# Patient Record
Sex: MALE | Race: WHITE | Employment: OTHER | URBAN - METROPOLITAN AREA
[De-identification: names, ages, dates, MRNs, and addresses within clinical notes are randomized per-mention and may not be internally consistent; named-entity substitution may affect disease eponyms.]

---

## 2017-09-14 PROBLEM — Z86.73 HISTORY OF STROKE: Status: ACTIVE | Noted: 2017-09-14

## 2017-09-14 PROBLEM — F01.50 VASCULAR DEMENTIA WITHOUT BEHAVIORAL DISTURBANCE (HCC): Status: ACTIVE | Noted: 2017-09-14

## 2017-09-14 PROBLEM — G62.9 NEUROPATHY: Status: ACTIVE | Noted: 2017-09-14

## 2018-05-16 ENCOUNTER — OFFICE VISIT (OUTPATIENT)
Dept: NEUROLOGY | Age: 78
End: 2018-05-16
Payer: MEDICARE

## 2018-05-16 VITALS
OXYGEN SATURATION: 93 % | HEIGHT: 71 IN | BODY MASS INDEX: 27.72 KG/M2 | SYSTOLIC BLOOD PRESSURE: 128 MMHG | DIASTOLIC BLOOD PRESSURE: 77 MMHG | WEIGHT: 198 LBS | HEART RATE: 73 BPM

## 2018-05-16 DIAGNOSIS — R42 VERTIGO: ICD-10-CM

## 2018-05-16 DIAGNOSIS — R27.0 ATAXIA: Primary | ICD-10-CM

## 2018-05-16 PROCEDURE — 99214 OFFICE O/P EST MOD 30 MIN: CPT | Performed by: NURSE PRACTITIONER

## 2018-05-16 RX ORDER — PANTOPRAZOLE SODIUM 40 MG/1
40 TABLET, DELAYED RELEASE ORAL EVERY MORNING
Status: ON HOLD | COMMUNITY
Start: 2018-03-12 | End: 2022-08-18 | Stop reason: SDUPTHER

## 2018-06-01 ENCOUNTER — TELEPHONE (OUTPATIENT)
Dept: NEUROLOGY | Age: 78
End: 2018-06-01

## 2018-06-08 ENCOUNTER — TELEPHONE (OUTPATIENT)
Dept: NEUROLOGY | Age: 78
End: 2018-06-08

## 2018-08-16 ENCOUNTER — OFFICE VISIT (OUTPATIENT)
Dept: NEUROLOGY | Age: 78
End: 2018-08-16
Payer: MEDICARE

## 2018-08-16 VITALS
RESPIRATION RATE: 18 BRPM | WEIGHT: 194 LBS | HEART RATE: 72 BPM | HEIGHT: 71 IN | OXYGEN SATURATION: 97 % | BODY MASS INDEX: 27.16 KG/M2 | SYSTOLIC BLOOD PRESSURE: 114 MMHG | DIASTOLIC BLOOD PRESSURE: 70 MMHG

## 2018-08-16 DIAGNOSIS — G62.9 PERIPHERAL POLYNEUROPATHY: ICD-10-CM

## 2018-08-16 DIAGNOSIS — F03.90 DEMENTIA WITHOUT BEHAVIORAL DISTURBANCE, UNSPECIFIED DEMENTIA TYPE: Primary | ICD-10-CM

## 2018-08-16 PROCEDURE — 99213 OFFICE O/P EST LOW 20 MIN: CPT | Performed by: NURSE PRACTITIONER

## 2018-08-16 NOTE — PROGRESS NOTES
R---wears aids   IX: soft palate elevation  Normal   IX,X: gag reflex    XI: trapezius strength  5/5   XI: sternocleidomastoid strength 5/5   XI: neck extension strength  5/5   XII: tongue strength  Normal     Motor:  5/5 throughout  No drift  Spastic legs  No abnormal movements    Sensory:  LT normal b/l  PP decreased to mid shin b/l  Vibration minimally impaired R ankle; mod at L    Coordination:   FN and FFM intact b/l  HS slower but intact b/l    Gait:  Normal but slightly spastic  Romberg's neg    DTR:   Right Brachioradialis reflex 1+  Left Brachioradialis reflex 1+  Right Biceps reflex 1+  Left Biceps reflex 1+  Right Triceps reflex 1+  Left Triceps reflex 1+  Right Quadriceps reflex 1+  Left Quadriceps reflex 1+  Right Achilles reflex 0  Left Achilles reflex 0    No Rodriguez's  No grasps, suck, or other pathological reflexes    Laboratory/Radiology:  ry/Radiology:     No current labs to review    MRI brain disc personally review in 2018: no acute strokes; remote occipital stroke; min-mod remote     Assessment:     The patient suffers from dementia---suspect due to Alzheimer's disease---and is stable on Namenda 10 mg BID and Aricept 10mg daily    No worsening of his memory or behavioral issues on this regimen which will be continued    MRI proved no new strokes and mild-mod small vessel disease. He remains on ASA/statin for secondary prevention    His neuro exam reveals sensory changes in both legs consistent with LS radiculopathy---which was suspected based on his EDX studies. Fall precautions were again reviewed as this is contributing to his gait issues. He was encouraged to remain physically and socially active    Medically, he is stable.     Plan:     Continue Namenda 10 mg BID and Aricept 10 mg nightly    Continue ASA/statin    Obtain recent labs from his PCP    He will call with any questions    RTO in 6 months or sooner LIZETH Pennington FNP-C  1:55 PM  2018

## 2018-08-16 NOTE — PATIENT INSTRUCTIONS
exactly as prescribed. Call the doctor if you notice any problems with the medicine. · Make a list of the person's medicines. Review it with all of his or her doctors. · Help the person eat a balanced diet. Serve plenty of whole grains, fruits, and vegetables every day. If the person is not hungry at mealtimes, give snacks at midmorning and in the afternoon. Offer drinks such as Boost, Ensure, or Sustacal if the person is losing weight. · Encourage exercise. Walking and other activities may slow the decline of mental ability. Help the person stay active mentally with reading, crossword puzzles, or other hobbies. · Take steps to help if the person is sundowning. This is the restless behavior and trouble with sleeping that may occur in late afternoon and at night. Try not to let the person nap during the day. Offer a glass of warm milk or caffeine-free tea before bedtime. · Develop a routine. Your loved one will feel less frustrated or confused with a clear, simple plan of what to do every day. · Be patient. A task may take the person longer than it used to. · For as long as he or she is able, allow your loved one to make decisions about activities, food, clothing, and other choices. Let him or her be independent, even if tasks take more time or are not done perfectly. Tailor tasks to the person's abilities. For example, if cooking is no longer safe, ask for other help. Your loved one can help set the table, or make simple dishes such as a salad. When the person needs help, offer it gently. Staying safe  · Make your home (or your loved one's home) safe. Tack down rugs, and put no-slip tape in the tub. Install handrails, and put safety switches on stoves and appliances. Keep rooms free of clutter. Make sure walkways around furniture are clear. Do not move furniture around, because the person may become confused. · Use locks on doors and cupboards.  Lock up knives, scissors, medicines, cleaning supplies, and to learn more about \"Helping A Person With Dementia: Care Instructions. \"     If you do not have an account, please click on the \"Sign Up Now\" link. Current as of: December 7, 2017  Content Version: 11.7  © 5891-8930 [employer]. Care instructions adapted under license by [facility] ([facility]). If you have questions about a medical condition or this instruction, always ask your healthcare professional. [address] any warranty or liability for your use of this information.

## 2018-08-20 ENCOUNTER — TELEPHONE (OUTPATIENT)
Dept: NEUROLOGY | Age: 78
End: 2018-08-20

## 2018-08-20 NOTE — TELEPHONE ENCOUNTER
The testing was done correctly. If she reads the impression and summary, both the arms and legs are mentioned.  Thanks you

## 2018-10-17 ENCOUNTER — TELEPHONE (OUTPATIENT)
Dept: NEUROLOGY | Age: 78
End: 2018-10-17

## 2018-10-17 ENCOUNTER — OFFICE VISIT (OUTPATIENT)
Dept: NEUROLOGY | Age: 78
End: 2018-10-17
Payer: MEDICARE

## 2018-10-17 VITALS
HEIGHT: 71 IN | TEMPERATURE: 97.4 F | WEIGHT: 202 LBS | RESPIRATION RATE: 12 BRPM | DIASTOLIC BLOOD PRESSURE: 74 MMHG | SYSTOLIC BLOOD PRESSURE: 128 MMHG | OXYGEN SATURATION: 92 % | BODY MASS INDEX: 28.28 KG/M2 | HEART RATE: 72 BPM

## 2018-10-17 DIAGNOSIS — F03.90 DEMENTIA WITHOUT BEHAVIORAL DISTURBANCE, UNSPECIFIED DEMENTIA TYPE: Primary | ICD-10-CM

## 2018-10-17 PROCEDURE — 99213 OFFICE O/P EST LOW 20 MIN: CPT | Performed by: NURSE PRACTITIONER

## 2018-10-17 RX ORDER — ASCORBIC ACID 1000 MG
TABLET ORAL DAILY
COMMUNITY
End: 2019-03-20

## 2018-10-17 RX ORDER — MEMANTINE HYDROCHLORIDE 10 MG/1
10 TABLET ORAL 2 TIMES DAILY
Qty: 180 TABLET | Refills: 2 | Status: SHIPPED
Start: 2018-10-17 | End: 2020-06-24 | Stop reason: SDUPTHER

## 2018-10-17 RX ORDER — DONEPEZIL HYDROCHLORIDE 10 MG/1
TABLET, FILM COATED ORAL
Qty: 30 TABLET | Refills: 3 | Status: SHIPPED | OUTPATIENT
Start: 2018-10-17 | End: 2019-03-20 | Stop reason: DRUGHIGH

## 2018-10-17 NOTE — PROGRESS NOTES
[address] [place] [address]. Aleshia Amaya M.D., F.A.C.P. Pelon Quijano, DNP, APRN, [facility]  Tarun Spink. Jimbo Brady, MSN, APRN, FNP-C  [address] [facility]  RHE[place] floyd, [address]  Phone: [phone]  Fax: [phone]       Britta Claire is a 65 y.o. right handed man    We are following him for dementia and LS radiculopathy    He presents alone today---tells me his wife was still in bed when he left the house    He denies any new or worsening memory issues since his last visit. Still taking Namenda 10 mg BID and Aricept---taking 5 mg in AM and 5 mg PM per his request due to side effects with full dose at bedtime. No issues driving or navigating to familiar places. No difficulty performing his ADLs, eating, drinking, or sleeping    No behavioral or mood issues. Still remains socially active and reads every night. He still notes some issues with walking and some balance difficulties---particularly in his L leg. He tells me his wife thinks he shuffles but he does not think so---no falls.     No new weakness, speech changes, paresthesias, chewing/swallowing troubles, or other new neuro issues    No recent labs to review    Medically, he is otherwise stable    No chest pain or palpitations  No SOB  No incontinence of bowels or bladder  No itching or bruising appreciated    ROS otherwise negative     Current Outpatient Prescriptions   Medication Sig Dispense Refill    Ginkgo Biloba 40 MG TABS Take by mouth daily      pantoprazole (PROTONIX) 40 MG tablet       memantine (NAMENDA) 10 MG tablet Take 1 tablet by mouth 2 times daily 180 tablet 2    donepezil (ARICEPT) 10 MG tablet Take 10 mg by mouth nightly 1/2 in the am 1/2 in the pm      atorvastatin (LIPITOR) 40 MG tablet Take 40 mg by mouth daily      amLODIPine (NORVASC) 10 MG tablet Take 10 mg by mouth daily      vitamin B-12 (CYANOCOBALAMIN) 500 MCG tablet Take 500 mcg by mouth daily      Multiple Vitamins-Minerals

## 2018-10-17 NOTE — PATIENT INSTRUCTIONS
helps you feel independent. Your state 's license bureau can do a driving test if there is any question. Plan for other means of getting around when you are no longer able to drive. · Use strong lighting, especially at night. Put night-lights in bedrooms, hallways, and bathrooms. · Lower the hot water temperature setting to 120°F or lower to avoid burns. When should you call for help? Call 911 anytime you think you may need emergency care. For example, call if:    · You are lost and do not know whom to call.     · You are injured and do not know whom to call.    Call your doctor now or seek immediate medical care if:    · Your symptoms suddenly get much worse.    Watch closely for changes in your health, and be sure to contact your doctor if:    · You want more information about how you can take care of yourself. Where can you learn more? Go to [email or web address] and sign in to your [employer] account. Enter Y179 in the Purple box to learn more about \"Alzheimer's Disease: Care Instructions. \"     If you do not have an account, please click on the \"Sign Up Now\" link. Current as of: December 7, 2017  Content Version: 11.7  © 2735-8941 [employer]. Care instructions adapted under license by [facility] ([facility]). If you have questions about a medical condition or this instruction, always ask your healthcare professional. [place] [address] any warranty or liability for your use of this information.

## 2018-12-12 ENCOUNTER — TELEPHONE (OUTPATIENT)
Dept: NEUROLOGY | Age: 78
End: 2018-12-12

## 2018-12-12 NOTE — TELEPHONE ENCOUNTER
MA spoke with pt's wife, Denisse Mcduffie, who stated they had received a letter from [facility] that LIZETH Levine, was not an in network provider. MA spoke with [facility] [address] [place] in payer delegation/enrollment who stated Devonte Barfield was inadvertently inactivated back in early 2018 due to a technical issue but is in the process of being reloaded back in with the payers. This will not be updated with the payers until January but Devonte Barfield can still see patients; however, billing will hold her claims until payers confirm reloaded. MA informed Denisse  of this who verbalized understanding.   Electronically signed by Xi Taylor on 12/12/18 at 11:21 AM

## 2019-01-10 ENCOUNTER — TELEPHONE (OUTPATIENT)
Dept: NEUROLOGY | Age: 79
End: 2019-01-10

## 2019-02-08 ENCOUNTER — TELEPHONE (OUTPATIENT)
Dept: NEUROLOGY | Age: 79
End: 2019-02-08

## 2019-02-25 ENCOUNTER — TELEPHONE (OUTPATIENT)
Dept: NEUROLOGY | Age: 79
End: 2019-02-25

## 2019-03-05 ENCOUNTER — TELEPHONE (OUTPATIENT)
Dept: NEUROLOGY | Age: 79
End: 2019-03-05

## 2019-03-20 ENCOUNTER — OFFICE VISIT (OUTPATIENT)
Dept: NEUROLOGY | Age: 79
End: 2019-03-20
Payer: MEDICARE

## 2019-03-20 VITALS
HEIGHT: 71 IN | BODY MASS INDEX: 27.72 KG/M2 | RESPIRATION RATE: 15 BRPM | OXYGEN SATURATION: 95 % | WEIGHT: 198 LBS | HEART RATE: 79 BPM | DIASTOLIC BLOOD PRESSURE: 70 MMHG | TEMPERATURE: 97 F | SYSTOLIC BLOOD PRESSURE: 145 MMHG

## 2019-03-20 DIAGNOSIS — F02.80 ALZHEIMER'S DEMENTIA WITHOUT BEHAVIORAL DISTURBANCE, UNSPECIFIED TIMING OF DEMENTIA ONSET: Primary | ICD-10-CM

## 2019-03-20 DIAGNOSIS — G62.9 SENSORY MOTOR NEUROPATHY: ICD-10-CM

## 2019-03-20 DIAGNOSIS — M54.10 RADICULAR PAIN OF BOTH LOWER EXTREMITIES: ICD-10-CM

## 2019-03-20 DIAGNOSIS — G30.9 ALZHEIMER'S DEMENTIA WITHOUT BEHAVIORAL DISTURBANCE, UNSPECIFIED TIMING OF DEMENTIA ONSET: Primary | ICD-10-CM

## 2019-03-20 PROCEDURE — 4040F PNEUMOC VAC/ADMIN/RCVD: CPT | Performed by: NURSE PRACTITIONER

## 2019-03-20 PROCEDURE — G8419 CALC BMI OUT NRM PARAM NOF/U: HCPCS | Performed by: NURSE PRACTITIONER

## 2019-03-20 PROCEDURE — 1101F PT FALLS ASSESS-DOCD LE1/YR: CPT | Performed by: NURSE PRACTITIONER

## 2019-03-20 PROCEDURE — 1036F TOBACCO NON-USER: CPT | Performed by: NURSE PRACTITIONER

## 2019-03-20 PROCEDURE — G8484 FLU IMMUNIZE NO ADMIN: HCPCS | Performed by: NURSE PRACTITIONER

## 2019-03-20 PROCEDURE — G8427 DOCREV CUR MEDS BY ELIG CLIN: HCPCS | Performed by: NURSE PRACTITIONER

## 2019-03-20 PROCEDURE — 1123F ACP DISCUSS/DSCN MKR DOCD: CPT | Performed by: NURSE PRACTITIONER

## 2019-03-20 PROCEDURE — 99215 OFFICE O/P EST HI 40 MIN: CPT | Performed by: NURSE PRACTITIONER

## 2019-03-20 RX ORDER — DONEPEZIL HYDROCHLORIDE 10 MG/1
TABLET, FILM COATED ORAL
Qty: 30 TABLET | Refills: 3 | Status: SHIPPED
Start: 2019-03-20 | End: 2020-06-24 | Stop reason: ALTCHOICE

## 2019-03-20 RX ORDER — VITAMIN E 268 MG
400 CAPSULE ORAL EVERY MORNING
COMMUNITY
Start: 2009-12-09 | End: 2022-08-16 | Stop reason: ALTCHOICE

## 2019-03-21 ENCOUNTER — TELEPHONE (OUTPATIENT)
Dept: NEUROLOGY | Age: 79
End: 2019-03-21

## 2019-03-25 ENCOUNTER — TELEPHONE (OUTPATIENT)
Dept: NEUROLOGY | Age: 79
End: 2019-03-25

## 2019-04-05 ENCOUNTER — HOSPITAL ENCOUNTER (OUTPATIENT)
Dept: NEUROLOGY | Age: 79
Discharge: HOME | End: 2019-04-05
Payer: MEDICARE

## 2019-04-05 DIAGNOSIS — G62.9 PERIPHERAL POLYNEUROPATHY: ICD-10-CM

## 2019-04-05 DIAGNOSIS — M54.17 L-S RADICULOPATHY: Primary | ICD-10-CM

## 2019-04-05 DIAGNOSIS — M54.17 L-S RADICULOPATHY: ICD-10-CM

## 2019-04-05 PROCEDURE — 95911 NRV CNDJ TEST 9-10 STUDIES: CPT | Performed by: PSYCHIATRY & NEUROLOGY

## 2019-04-05 PROCEDURE — 95911 NRV CNDJ TEST 9-10 STUDIES: CPT

## 2019-04-05 PROCEDURE — 95886 MUSC TEST DONE W/N TEST COMP: CPT

## 2019-04-05 PROCEDURE — 95886 MUSC TEST DONE W/N TEST COMP: CPT | Performed by: PSYCHIATRY & NEUROLOGY

## 2019-04-05 NOTE — PROCEDURES
head) N None None None None N N N N   R. Vastus lateralis N None None None None N N N N   R. Gastrocnemius (Medial head) N None None None None N N 1+ Decr   R. Tibialis anterior N None None None None N N 1+ Decr   R. Flexor digitorum longus N None None None None N N 1+ Sl Decr   R. Extensor hallucis longus N None None None None N N 1+ Decr   R. Dorsal interossei (pedis) N None None None None N N N Distant MUPs   R. Extensor digitorum brevis N None None None None N N N Distant MUPs         Nerve conduction studies in both legs disclosed the following abnormalities---absent superficial peroneal and sural sensory nerve action potentials. The F-wave latencies of the left peroneal and both tibial nerves were slightly delayed. Both H reflex latencies were not obtained. These findings were compared to the referential values in this laboratory, available upon request.    Monopolar needle examination of the right leg disclosed chronic axonal loss with reinnervation in the distal muscles of that limb. Needle testing of the paraspinals was unremarkable as well. 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 There were no other peripheral neuropathies. There were no definitive motor radiculopathies or intracanalicular lesions. Sensory radiculopathies can not be evaluated by electrodiagnostic means. The studies revealed minimal progression of this peripheral neuropathic disease compared to previous studies 2 years ago. Clinically, the patient presented with a frontal gait disorder and distal leg sensory findings. The above studies confirmed a sensorimotor peripheral neuropathy---again minimal.  This disorder may be contributing to his gait abnormalities. Clinical correlation was highly advised.

## 2019-05-03 ENCOUNTER — TELEPHONE (OUTPATIENT)
Dept: NEUROLOGY | Age: 79
End: 2019-05-03

## 2019-05-03 NOTE — TELEPHONE ENCOUNTER
Patient's wife Annette Spoon called in asking for report on EMG that Sadaf Reynaga had done 4/5/2019. She is going to get that from Medical Records at the hospital. She stated that Dr. Elenita Lozada told them that there was a pinched nerve and that he should have MRI done on his back. There is nothing in the report. Patient is wanting Ty Grimaldo to look at the report and see if she thinks he needs an MRI. MA informed patient that this would be forwarded to Ty STANLEY for advisement. Patient can be reached at [phone].

## 2019-05-03 NOTE — TELEPHONE ENCOUNTER
EMG proved minimal peripheral neuropathy--with minimal progression since the past study---- and no evidence of pinched nerve, therefore no need for MRI. Some of his gait issues are due to his dementia, which can cause changes in walking and a shuffling gait--the little bit of neuropathy is contributing.

## 2019-05-03 NOTE — TELEPHONE ENCOUNTER
MA informed Viry Talley of EMG results per Gevena Kayser and Viry Talley understood.   Electronically signed by Ehsan Nesbitt on 5/3/19 at 2:18 PM

## 2019-06-11 ENCOUNTER — APPOINTMENT (OUTPATIENT)
Dept: GENERAL RADIOLOGY | Age: 79
DRG: 871 | End: 2019-06-11
Payer: MEDICARE

## 2019-06-11 ENCOUNTER — APPOINTMENT (OUTPATIENT)
Dept: CT IMAGING | Age: 79
DRG: 871 | End: 2019-06-11
Payer: MEDICARE

## 2019-06-11 ENCOUNTER — HOSPITAL ENCOUNTER (INPATIENT)
Age: 79
LOS: 5 days | Discharge: HOME HEALTH | DRG: 871 | End: 2019-06-16
Attending: EMERGENCY MEDICINE | Admitting: INTERNAL MEDICINE
Payer: MEDICARE

## 2019-06-11 DIAGNOSIS — J18.9 PNEUMONIA DUE TO ORGANISM: ICD-10-CM

## 2019-06-11 DIAGNOSIS — J96.01 ACUTE RESPIRATORY FAILURE WITH HYPOXIA (HCC): ICD-10-CM

## 2019-06-11 DIAGNOSIS — E87.20 LACTIC ACIDOSIS: ICD-10-CM

## 2019-06-11 DIAGNOSIS — K56.609 SMALL BOWEL OBSTRUCTION (HCC): Primary | ICD-10-CM

## 2019-06-11 LAB
ABO/RH: NORMAL
ALBUMIN SERPL-MCNC: 4.2 G/DL (ref 3.5–5.2)
ALP BLD-CCNC: 116 U/L (ref 40–129)
ALT SERPL-CCNC: 15 U/L (ref 0–40)
ANION GAP SERPL CALCULATED.3IONS-SCNC: 14 MMOL/L (ref 7–16)
ANTIBODY SCREEN: NORMAL
AST SERPL-CCNC: 23 U/L (ref 0–39)
B.E.: -5.2 MMOL/L (ref -3–3)
BACTERIA: ABNORMAL /HPF
BILIRUB SERPL-MCNC: 0.9 MG/DL (ref 0–1.2)
BILIRUBIN URINE: NEGATIVE
BLOOD, URINE: ABNORMAL
BUN BLDV-MCNC: 21 MG/DL (ref 8–23)
CALCIUM SERPL-MCNC: 8.8 MG/DL (ref 8.6–10.2)
CASTS UA: ABNORMAL /LPF
CHLORIDE BLD-SCNC: 102 MMOL/L (ref 98–107)
CLARITY: ABNORMAL
CO2: 23 MMOL/L (ref 22–29)
COHB: 1.1 % (ref 0–1.5)
COLOR: YELLOW
CREAT SERPL-MCNC: 1.4 MG/DL (ref 0.7–1.2)
CRITICAL: ABNORMAL
DATE ANALYZED: ABNORMAL
DATE OF COLLECTION: ABNORMAL
EKG ATRIAL RATE: 68 BPM
EKG P AXIS: 52 DEGREES
EKG P-R INTERVAL: 196 MS
EKG Q-T INTERVAL: 426 MS
EKG QRS DURATION: 86 MS
EKG QTC CALCULATION (BAZETT): 452 MS
EKG R AXIS: 38 DEGREES
EKG T AXIS: 51 DEGREES
EKG VENTRICULAR RATE: 68 BPM
FILM ARRAY ADENOVIRUS: NORMAL
FILM ARRAY BORDETELLA PERTUSSIS: NORMAL
FILM ARRAY CHLAMYDOPHILIA PNEUMONIAE: NORMAL
FILM ARRAY CORONAVIRUS 229E: NORMAL
FILM ARRAY CORONAVIRUS HKU1: NORMAL
FILM ARRAY CORONAVIRUS NL63: NORMAL
FILM ARRAY CORONAVIRUS OC43: NORMAL
FILM ARRAY INFLUENZA A VIRUS 09H1: NORMAL
FILM ARRAY INFLUENZA A VIRUS H1: NORMAL
FILM ARRAY INFLUENZA A VIRUS H3: NORMAL
FILM ARRAY INFLUENZA A VIRUS: NORMAL
FILM ARRAY INFLUENZA B: NORMAL
FILM ARRAY METAPNEUMOVIRUS: NORMAL
FILM ARRAY MYCOPLASMA PNEUMONIAE: NORMAL
FILM ARRAY PARAINFLUENZA VIRUS 1: NORMAL
FILM ARRAY PARAINFLUENZA VIRUS 2: NORMAL
FILM ARRAY PARAINFLUENZA VIRUS 3: NORMAL
FILM ARRAY PARAINFLUENZA VIRUS 4: NORMAL
FILM ARRAY RESPIRATORY SYNCITIAL VIRUS: NORMAL
FILM ARRAY RHINOVIRUS/ENTEROVIRUS: NORMAL
GFR AFRICAN AMERICAN: 59
GFR NON-AFRICAN AMERICAN: 49 ML/MIN/1.73
GLUCOSE BLD-MCNC: 189 MG/DL (ref 74–99)
GLUCOSE URINE: 100 MG/DL
HCO3: 20 MMOL/L (ref 22–26)
HCT VFR BLD CALC: 50.6 % (ref 37–54)
HEMOGLOBIN: 17.6 G/DL (ref 12.5–16.5)
HHB: 16.1 % (ref 0–5)
KETONES, URINE: NEGATIVE MG/DL
LAB: ABNORMAL
LACTIC ACID: 2.6 MMOL/L (ref 0.5–2.2)
LACTIC ACID: 5.4 MMOL/L (ref 0.5–2.2)
LEUKOCYTE ESTERASE, URINE: NEGATIVE
LIPASE: 21 U/L (ref 13–60)
Lab: ABNORMAL
MCH RBC QN AUTO: 32.4 PG (ref 26–35)
MCHC RBC AUTO-ENTMCNC: 34.8 % (ref 32–34.5)
MCV RBC AUTO: 93.2 FL (ref 80–99.9)
METHB: 0.2 % (ref 0–1.5)
MODE: ABNORMAL
NITRITE, URINE: NEGATIVE
O2 CONTENT: 20.3 ML/DL
O2 SATURATION: 83.7 % (ref 92–98.5)
O2HB: 82.6 % (ref 94–97)
OPERATOR ID: 101
PATIENT TEMP: 37 C
PCO2: 38.2 MMHG (ref 35–45)
PDW BLD-RTO: 12.2 FL (ref 11.5–15)
PH BLOOD GAS: 7.34 (ref 7.35–7.45)
PH UA: 6 (ref 5–9)
PLATELET # BLD: 130 E9/L (ref 130–450)
PMV BLD AUTO: 10.9 FL (ref 7–12)
PO2: 51.4 MMHG (ref 60–100)
POTASSIUM REFLEX MAGNESIUM: 4.3 MMOL/L (ref 3.5–5)
PRO-BNP: 170 PG/ML (ref 0–450)
PROTEIN UA: 30 MG/DL
RBC # BLD: 5.43 E12/L (ref 3.8–5.8)
RBC UA: ABNORMAL /HPF (ref 0–2)
SODIUM BLD-SCNC: 139 MMOL/L (ref 132–146)
SOURCE, BLOOD GAS: ABNORMAL
SPECIFIC GRAVITY UA: 1.01 (ref 1–1.03)
THB: 17.5 G/DL (ref 11.5–16.5)
TIME ANALYZED: 1639
TOTAL PROTEIN: 6.6 G/DL (ref 6.4–8.3)
TROPONIN: <0.01 NG/ML (ref 0–0.03)
UROBILINOGEN, URINE: 0.2 E.U./DL
WBC # BLD: 9 E9/L (ref 4.5–11.5)
WBC UA: ABNORMAL /HPF (ref 0–5)

## 2019-06-11 PROCEDURE — 87088 URINE BACTERIA CULTURE: CPT

## 2019-06-11 PROCEDURE — 86901 BLOOD TYPING SEROLOGIC RH(D): CPT

## 2019-06-11 PROCEDURE — 87581 M.PNEUMON DNA AMP PROBE: CPT

## 2019-06-11 PROCEDURE — 2580000003 HC RX 258: Performed by: INTERNAL MEDICINE

## 2019-06-11 PROCEDURE — 2060000000 HC ICU INTERMEDIATE R&B

## 2019-06-11 PROCEDURE — 82805 BLOOD GASES W/O2 SATURATION: CPT

## 2019-06-11 PROCEDURE — 85027 COMPLETE CBC AUTOMATED: CPT

## 2019-06-11 PROCEDURE — C9113 INJ PANTOPRAZOLE SODIUM, VIA: HCPCS | Performed by: STUDENT IN AN ORGANIZED HEALTH CARE EDUCATION/TRAINING PROGRAM

## 2019-06-11 PROCEDURE — 99285 EMERGENCY DEPT VISIT HI MDM: CPT

## 2019-06-11 PROCEDURE — 86850 RBC ANTIBODY SCREEN: CPT

## 2019-06-11 PROCEDURE — 6360000002 HC RX W HCPCS: Performed by: INTERNAL MEDICINE

## 2019-06-11 PROCEDURE — 71275 CT ANGIOGRAPHY CHEST: CPT

## 2019-06-11 PROCEDURE — 87040 BLOOD CULTURE FOR BACTERIA: CPT

## 2019-06-11 PROCEDURE — 2580000003 HC RX 258: Performed by: STUDENT IN AN ORGANIZED HEALTH CARE EDUCATION/TRAINING PROGRAM

## 2019-06-11 PROCEDURE — 87486 CHLMYD PNEUM DNA AMP PROBE: CPT

## 2019-06-11 PROCEDURE — 80053 COMPREHEN METABOLIC PANEL: CPT

## 2019-06-11 PROCEDURE — 83880 ASSAY OF NATRIURETIC PEPTIDE: CPT

## 2019-06-11 PROCEDURE — 84484 ASSAY OF TROPONIN QUANT: CPT

## 2019-06-11 PROCEDURE — 96365 THER/PROPH/DIAG IV INF INIT: CPT

## 2019-06-11 PROCEDURE — 86900 BLOOD TYPING SEROLOGIC ABO: CPT

## 2019-06-11 PROCEDURE — 2580000003 HC RX 258: Performed by: EMERGENCY MEDICINE

## 2019-06-11 PROCEDURE — 74022 RADEX COMPL AQT ABD SERIES: CPT

## 2019-06-11 PROCEDURE — 87633 RESP VIRUS 12-25 TARGETS: CPT

## 2019-06-11 PROCEDURE — 83605 ASSAY OF LACTIC ACID: CPT

## 2019-06-11 PROCEDURE — 6360000002 HC RX W HCPCS: Performed by: STUDENT IN AN ORGANIZED HEALTH CARE EDUCATION/TRAINING PROGRAM

## 2019-06-11 PROCEDURE — 6360000004 HC RX CONTRAST MEDICATION: Performed by: RADIOLOGY

## 2019-06-11 PROCEDURE — 6370000000 HC RX 637 (ALT 250 FOR IP): Performed by: STUDENT IN AN ORGANIZED HEALTH CARE EDUCATION/TRAINING PROGRAM

## 2019-06-11 PROCEDURE — 81001 URINALYSIS AUTO W/SCOPE: CPT

## 2019-06-11 PROCEDURE — 96375 TX/PRO/DX INJ NEW DRUG ADDON: CPT

## 2019-06-11 PROCEDURE — 87798 DETECT AGENT NOS DNA AMP: CPT

## 2019-06-11 PROCEDURE — 93010 ELECTROCARDIOGRAM REPORT: CPT | Performed by: INTERNAL MEDICINE

## 2019-06-11 PROCEDURE — 74177 CT ABD & PELVIS W/CONTRAST: CPT

## 2019-06-11 PROCEDURE — 96367 TX/PROPH/DG ADDL SEQ IV INF: CPT

## 2019-06-11 PROCEDURE — 6360000002 HC RX W HCPCS: Performed by: EMERGENCY MEDICINE

## 2019-06-11 PROCEDURE — 6370000000 HC RX 637 (ALT 250 FOR IP): Performed by: EMERGENCY MEDICINE

## 2019-06-11 PROCEDURE — 94640 AIRWAY INHALATION TREATMENT: CPT

## 2019-06-11 PROCEDURE — C9113 INJ PANTOPRAZOLE SODIUM, VIA: HCPCS | Performed by: INTERNAL MEDICINE

## 2019-06-11 PROCEDURE — 36415 COLL VENOUS BLD VENIPUNCTURE: CPT

## 2019-06-11 PROCEDURE — 93005 ELECTROCARDIOGRAM TRACING: CPT | Performed by: STUDENT IN AN ORGANIZED HEALTH CARE EDUCATION/TRAINING PROGRAM

## 2019-06-11 PROCEDURE — 83690 ASSAY OF LIPASE: CPT

## 2019-06-11 RX ORDER — SODIUM CHLORIDE 9 MG/ML
INJECTION, SOLUTION INTRAVENOUS CONTINUOUS
Status: DISCONTINUED | OUTPATIENT
Start: 2019-06-11 | End: 2019-06-11

## 2019-06-11 RX ORDER — SODIUM CHLORIDE 0.9 % (FLUSH) 0.9 %
10 SYRINGE (ML) INJECTION PRN
Status: DISCONTINUED | OUTPATIENT
Start: 2019-06-11 | End: 2019-06-16 | Stop reason: HOSPADM

## 2019-06-11 RX ORDER — SODIUM CHLORIDE 0.9 % (FLUSH) 0.9 %
10 SYRINGE (ML) INJECTION EVERY 12 HOURS SCHEDULED
Status: DISCONTINUED | OUTPATIENT
Start: 2019-06-11 | End: 2019-06-15

## 2019-06-11 RX ORDER — 0.9 % SODIUM CHLORIDE 0.9 %
1000 INTRAVENOUS SOLUTION INTRAVENOUS ONCE
Status: COMPLETED | OUTPATIENT
Start: 2019-06-11 | End: 2019-06-11

## 2019-06-11 RX ORDER — ONDANSETRON 2 MG/ML
4 INJECTION INTRAMUSCULAR; INTRAVENOUS EVERY 6 HOURS PRN
Status: DISCONTINUED | OUTPATIENT
Start: 2019-06-11 | End: 2019-06-16 | Stop reason: HOSPADM

## 2019-06-11 RX ORDER — SODIUM CHLORIDE 9 MG/ML
INJECTION, SOLUTION INTRAVENOUS CONTINUOUS
Status: ACTIVE | OUTPATIENT
Start: 2019-06-11 | End: 2019-06-12

## 2019-06-11 RX ORDER — ONDANSETRON 2 MG/ML
4 INJECTION INTRAMUSCULAR; INTRAVENOUS ONCE
Status: COMPLETED | OUTPATIENT
Start: 2019-06-11 | End: 2019-06-11

## 2019-06-11 RX ORDER — PANTOPRAZOLE SODIUM 40 MG/10ML
40 INJECTION, POWDER, LYOPHILIZED, FOR SOLUTION INTRAVENOUS ONCE
Status: COMPLETED | OUTPATIENT
Start: 2019-06-11 | End: 2019-06-11

## 2019-06-11 RX ORDER — IPRATROPIUM BROMIDE AND ALBUTEROL SULFATE 2.5; .5 MG/3ML; MG/3ML
1 SOLUTION RESPIRATORY (INHALATION)
Status: COMPLETED | OUTPATIENT
Start: 2019-06-11 | End: 2019-06-11

## 2019-06-11 RX ORDER — 0.9 % SODIUM CHLORIDE 0.9 %
1000 INTRAVENOUS SOLUTION INTRAVENOUS ONCE
Status: DISCONTINUED | OUTPATIENT
Start: 2019-06-11 | End: 2019-06-11

## 2019-06-11 RX ORDER — M-VIT,TX,IRON,MINS/CALC/FOLIC 27MG-0.4MG
1 TABLET ORAL EVERY MORNING
COMMUNITY

## 2019-06-11 RX ORDER — PANTOPRAZOLE SODIUM 40 MG/10ML
40 INJECTION, POWDER, LYOPHILIZED, FOR SOLUTION INTRAVENOUS DAILY
Status: DISCONTINUED | OUTPATIENT
Start: 2019-06-11 | End: 2019-06-12

## 2019-06-11 RX ORDER — CHLORAL HYDRATE 500 MG
1000 CAPSULE ORAL EVERY MORNING
COMMUNITY
End: 2021-07-26

## 2019-06-11 RX ORDER — FENTANYL CITRATE 50 UG/ML
50 INJECTION, SOLUTION INTRAMUSCULAR; INTRAVENOUS ONCE
Status: COMPLETED | OUTPATIENT
Start: 2019-06-11 | End: 2019-06-11

## 2019-06-11 RX ORDER — LORAZEPAM 2 MG/ML
0.5 INJECTION INTRAMUSCULAR
Status: ACTIVE | OUTPATIENT
Start: 2019-06-11 | End: 2019-06-11

## 2019-06-11 RX ORDER — 0.9 % SODIUM CHLORIDE 0.9 %
10 VIAL (ML) INJECTION DAILY
Status: DISCONTINUED | OUTPATIENT
Start: 2019-06-11 | End: 2019-06-12

## 2019-06-11 RX ORDER — IPRATROPIUM BROMIDE AND ALBUTEROL SULFATE 2.5; .5 MG/3ML; MG/3ML
1 SOLUTION RESPIRATORY (INHALATION)
Status: DISCONTINUED | OUTPATIENT
Start: 2019-06-12 | End: 2019-06-16 | Stop reason: HOSPADM

## 2019-06-11 RX ADMIN — MEROPENEM 1 G: 1 INJECTION INTRAVENOUS at 17:31

## 2019-06-11 RX ADMIN — AZITHROMYCIN 500 MG: 500 INJECTION, POWDER, LYOPHILIZED, FOR SOLUTION INTRAVENOUS at 22:41

## 2019-06-11 RX ADMIN — SODIUM CHLORIDE 1000 ML: 9 INJECTION, SOLUTION INTRAVENOUS at 14:54

## 2019-06-11 RX ADMIN — SODIUM CHLORIDE 1000 ML: 9 INJECTION, SOLUTION INTRAVENOUS at 18:52

## 2019-06-11 RX ADMIN — FENTANYL CITRATE 50 MCG: 50 INJECTION INTRAMUSCULAR; INTRAVENOUS at 14:57

## 2019-06-11 RX ADMIN — IPRATROPIUM BROMIDE AND ALBUTEROL SULFATE 1 AMPULE: .5; 3 SOLUTION RESPIRATORY (INHALATION) at 17:28

## 2019-06-11 RX ADMIN — PANTOPRAZOLE SODIUM 40 MG: 40 INJECTION, POWDER, FOR SOLUTION INTRAVENOUS at 22:40

## 2019-06-11 RX ADMIN — PANTOPRAZOLE SODIUM 40 MG: 40 INJECTION, POWDER, LYOPHILIZED, FOR SOLUTION INTRAVENOUS at 14:56

## 2019-06-11 RX ADMIN — LIDOCAINE HYDROCHLORIDE: 20 SOLUTION ORAL; TOPICAL at 17:30

## 2019-06-11 RX ADMIN — VANCOMYCIN HYDROCHLORIDE 1750 MG: 1 INJECTION, POWDER, LYOPHILIZED, FOR SOLUTION INTRAVENOUS at 18:04

## 2019-06-11 RX ADMIN — ONDANSETRON 4 MG: 2 INJECTION INTRAMUSCULAR; INTRAVENOUS at 14:57

## 2019-06-11 RX ADMIN — IOPAMIDOL 110 ML: 755 INJECTION, SOLUTION INTRAVENOUS at 17:03

## 2019-06-11 RX ADMIN — SODIUM CHLORIDE: 9 INJECTION, SOLUTION INTRAVENOUS at 21:05

## 2019-06-11 RX ADMIN — IPRATROPIUM BROMIDE AND ALBUTEROL SULFATE 1 AMPULE: .5; 3 SOLUTION RESPIRATORY (INHALATION) at 17:27

## 2019-06-11 RX ADMIN — IPRATROPIUM BROMIDE AND ALBUTEROL SULFATE 1 AMPULE: .5; 3 SOLUTION RESPIRATORY (INHALATION) at 17:26

## 2019-06-11 RX ADMIN — SODIUM CHLORIDE: 9 INJECTION, SOLUTION INTRAVENOUS at 21:30

## 2019-06-11 SDOH — HEALTH STABILITY: MENTAL HEALTH: HOW OFTEN DO YOU HAVE A DRINK CONTAINING ALCOHOL?: NEVER

## 2019-06-11 ASSESSMENT — PAIN SCALES - GENERAL
PAINLEVEL_OUTOF10: 8
PAINLEVEL_OUTOF10: 8
PAINLEVEL_OUTOF10: 0

## 2019-06-11 ASSESSMENT — ENCOUNTER SYMPTOMS: ABDOMINAL PAIN: 1

## 2019-06-11 ASSESSMENT — PAIN DESCRIPTION - PAIN TYPE: TYPE: ACUTE PAIN

## 2019-06-11 ASSESSMENT — PAIN DESCRIPTION - DESCRIPTORS: DESCRIPTORS: BURNING

## 2019-06-11 ASSESSMENT — PAIN DESCRIPTION - LOCATION: LOCATION: ABDOMEN

## 2019-06-11 ASSESSMENT — PAIN DESCRIPTION - ORIENTATION: ORIENTATION: MID

## 2019-06-11 NOTE — H&P
History and Physical      CHIEF COMPLAINT:  Abdominal pain      HISTORY OF PRESENT ILLNESS:      The patient is a 77 y.o. male patient of Dr. Ana Stallings  presents with abdominal pain with vomiting and diarrhea. The patient is presently alert and aware of illness c/o mid right abdominal pain and diarrhea. He denies fever or rigors. Past history of pulmonary asbestosis. The patient severely hypotensive in the ER with 4 L IVF administered. CT abdomen and pelvis:  IMPRESSIONS:   Small bowel obstruction versus ileus.   Consolidation within the bilateral lung bases may reflect infectious  pneumonia or atelectasis.         Past Medical History:    Past Medical History:   Diagnosis Date    [facility]A [facility]cere[facility] accident) [facility] [address] 2007    Heart murmur     Osteoarthritis     Prostate cancer ([facility] [address] 2007       Past Surgical History:    Past Surgical History:   Procedure Laterality Date    CATARACT REMOVAL      HERNIA REPAIR      PROSTATE SURGERY      beacons/radiation       Medications Prior to Admission:    Medications Prior to Admission: Omega-3 Fatty Acids (FISH OIL) 1000 MG CAPS, Take 1,000 mg by mouth every morning  Multiple Vitamins-Minerals (THERAPEUTIC MULTIVITAMIN-MINERALS) tablet, Take 1 tablet by mouth every morning  vitamin E 400 UNIT capsule, Take 400 Units by mouth every morning   donepezil (ARICEPT) 10 MG tablet, 1/2 tab twice daily (Patient taking differently: Take 5 mg by mouth 2 times daily )  memantine (NAMENDA) 10 MG tablet, Take 1 tablet by mouth 2 times daily  pantoprazole (PROTONIX) 40 MG tablet, Take 40 mg by mouth every morning   atorvastatin (LIPITOR) 40 MG tablet, Take 40 mg by mouth every morning   amLODIPine (NORVASC) 10 MG tablet, Take 10 mg by mouth every morning   vitamin B-12 (CYANOCOBALAMIN) 500 MCG tablet, Take 500 mcg by mouth every morning   aspirin 81 MG chewable tablet, Take 1 tablet by mouth daily (Patient taking differently: Take 81 mg by mouth every morning )  diazepam (VALIUM) 5 MG tablet, Take 5 mg by mouth every morning. Allergies:    Oxycodone-acetaminophen and Vicodin [hydrocodone-acetaminophen]    Social History:    reports that he quit smoking about 32 years ago. His smoking use included cigarettes. He started smoking about 60 years ago. He has never used smokeless tobacco. He reports that he does not drink alcohol or use drugs. Family History:   family history includes Crohn's Disease in his sister; Heart Disease in his father. REVIEW OF SYSTEMS:  As above in the HPI, otherwise negative    PHYSICAL EXAM:    Vitals:  BP (!) 98/56   Pulse 87   Temp 99 °F (37.2 °C) (Oral)   Resp 18   Ht 5' 7\" (1.702 m)   Wt 177 lb 14.4 oz (80.7 kg)   SpO2 93%   BMI 27.86 kg/m²     General:   Awake, alert, oriented X 3. No acute distress. HEENT:    Normocephalic, atraumatic. Pupils equal, round, reactive to light. No scleral icterus. No conjunctival injection. Oropharynx clear. No nasal discharge. Neck:       Supple. No bruits, adenopathy, masses, neck vein distention. Trachea in the midline. Heart:      RRR, no murmurs, gallops, rubs  Lungs:     CTA bilaterally, bilat symmetrical expansion, no wheeze, rales, or rhonchi  Abdomen:   Bowel sounds present, soft, mildly tender, no masses, no organomegaly, no peritoneal signs  Extremities:  No clubbing, cyanosis, or edema  Skin:         Warm and dry, no open lesions or rash  Neuro:     Cranial nerves 2-12 intact, no focal deficits  Rectal:    deferred  Genitalia:  deferred    LABS:    CBC with Differential:    Lab Results   Component Value Date    WBC 7.0 06/12/2019    RBC 4.40 06/12/2019    HGB 13.9 06/12/2019    HCT 41.5 06/12/2019     06/12/2019    MCV 94.3 06/12/2019    MCH 31.6 06/12/2019    MCHC 33.5 06/12/2019    RDW 12.4 06/12/2019    LYMPHOPCT 14.8 06/12/2019    MONOPCT 10.1 06/12/2019    BASOPCT 0.3 06/12/2019    MONOSABS 0.71 06/12/2019    LYMPHSABS 1.04 06/12/2019    EOSABS 0.02 06/12/2019    DASH 0.02 06/12/2019

## 2019-06-11 NOTE — ED PROVIDER NOTES
The patient is a 34-year-old male with a history of dementia who presents to the emergency department via EMS for abdominal pain. The patient's wife is at the bedside to help provide the history. She reports the patient was weak this morning, and was not able to get out of bed. She states the patient has been weak and fatigued throughout the day. He then was complaining of abdominal pain. He had an episode of hematemesis prior to arrival.  She states that he filled the toilet bowl with dark-colored emesis. He continues to complain of epigastric abdominal pain. She reports that she gave the patient Sil[place], but this did not help with his symptoms. The wife states the patient became too weak to go to the bathroom, and had an episode of urinary incontinence. She states he was so weak he was not able to lift his head off the bed. She states she then called his family doctor, Dr. Olivia Zelaya, who instructed her to call 911. EMS reports initial blood pressure on arrival was 62 systolic, they state in route he improved to 82 systolic. The patient denies any chest pain, shortness of breath, diarrhea, hematochezia, melena, recent hospitalization, recent illness, or other acute symptoms or concerns. Review of systems is somewhat limited due to the patient's history of dementia. Abdominal surgeries include a hernia repair many years ago and an appendectomy. No recent surgeries. The history is provided by the patient and the spouse. History limited by: dementia.    Abdominal Pain   Pain location:  Epigastric  Pain quality: aching    Pain radiates to:  Does not radiate  Pain severity:  Moderate  Onset quality:  Sudden  Duration:  1 day  Timing:  Constant  Chronicity:  New  Context: not diet changes, not recent illness, not sick contacts, not suspicious food intake and not trauma    Relieved by:  Nothing  Worsened by:  Nothing  Ineffective treatments:  Lying down and OTC medications  Risk factors: being elderly Risk factors: has not had multiple surgeries, no NSAID use, not obese and no recent hospitalization        Review of Systems   Unable to perform ROS: Dementia   Gastrointestinal: Positive for abdominal pain. Physical Exam   Constitutional: He is oriented to person, place, and time. He appears well-developed and well-nourished. He has a sickly appearance. He appears ill. Nasal cannula in place. The patient is uncomfortable and ill-appearing on initial presentation with nasal cannula in place. HENT:   Head: Normocephalic and atraumatic. Mouth/Throat: Mucous membranes are not pale and dry. Eyes: Conjunctivae, EOM and lids are normal.   Neck: No neck rigidity. Normal range of motion present. Cardiovascular: Normal rate, regular rhythm, S1 normal and S2 normal.   Pulmonary/Chest: Effort normal. No accessory muscle usage or stridor. No tachypnea. No respiratory distress. He has decreased breath sounds (Mildly diminished at the bilateral bases, no wheezing). He has no wheezes. He has no rhonchi. He has no rales. Abdominal: Soft. Bowel sounds are normal. He exhibits distension. There is tenderness (Moderate tenderness to palpation over the epigastric region and right upper quadrant) in the right upper quadrant and epigastric area. There is no rigidity, no rebound and no guarding. Musculoskeletal: He exhibits no edema (No lower extremity edema). Neurological: He is alert and oriented to person, place, and time. He displays no tremor. He exhibits normal muscle tone. Coordination normal.   Skin: Skin is warm, dry and intact. No pallor. Nursing note and vitals reviewed. Procedures    MDM  Number of Diagnoses or Management Options  Acute respiratory failure with hypoxia [facility]):   Lactic acidosis:   Pneumonia due to organism:   Small bowel obstruction [facility]):   Diagnosis management comments: The patient is a 65-year-old male who presents to the emergency department complaining of abdominal pain. Patient is ill-appearing and uncomfortable on initial presentation. He is mildly hypotensive at 102/60, but afebrile and not tachycardic. The patient was initially on a nonrebreather from EMS, this was transitioned to 2 L of nasal cannula. The patient was initially 95% when I evaluated him. However, the pulse ox desaturated to 87% on the nasal cannula. The oxygen was increased to 6 L. The patient's blood pressure improved to 120/62. Obtained blood gas with a change in respiratory status, pH 7.336, PO2 51.4, bicarb 20, PCO2 38. 2. He continued to deny any shortness of breath. Differential diagnosis includes but is not limited to pulmonary embolism versus congestive heart failure versus pneumonia versus urinary tract infection versus diverticulitis versus colitis versus perforated viscus versus cholecystitis versus choledocholithiasis versus ACS versus obstruction. There is no evidence of leukocytosis, no anemia, electrolytes are within normal limits, mild LIT with a creatinine of 1.4, lipase 21, lactic acid is elevated at 2.6, troponin negative, . Acute abdominal series did not show any evidence of free air. Treated the patient with Protonix, fentanyl, Zofran, 1 L of IV fluids, DuoNeb's, meropenem, vancomycin, GI cocktail. CT abdomen and chest pending at this time. The patient was signed out to Dr. Conor Saini. Dr. Conor Saini consulted with Dr. Imtiaz Hancock who accepted the patient for admission. EKG Interpretation. EKG: This EKG is signed and interpreted by me. Rate: 68  Rhythm: Sinus  Interpretation: Sinus Rhythm, normal axis, ST elevation in the inferior lateral leads  Comparison: no previous EKG available     EKG: This EKG is signed and interpreted by me.     Rate: 101  Rhythm: Sinus  Interpretation: Sinus rhythm, normal axis, no ST elevation, no ST elevation or depression  Comparison: changes compared to previous EKG       ED Course as of Jun 11 2001 Tue Jun 11, 2019   1705 Reassessed patient mL/min/1.73    GFR African American 59     Calcium 8.8 8.6 - 10.2 mg/dL    Total Protein 6.6 6.4 - 8.3 g/dL    Alb 4.2 3.5 - 5.2 g/dL    Total Bilirubin 0.9 0.0 - 1.2 mg/dL    Alkaline Phosphatase 116 40 - 129 U/L    ALT 15 0 - 40 U/L    AST 23 0 - 39 U/L   Lipase   Result Value Ref Range    Lipase 21 13 - 60 U/L   Lactic acid, plasma   Result Value Ref Range    Lactic Acid 2.6 (H) 0.5 - 2.2 mmol/L   Urinalysis with Microscopic   Result Value Ref Range    Color, UA Yellow Straw/Yellow    Clarity, UA SL CLOUDY Clear    Glucose, Ur 100 (A) Negative mg/dL    Bilirubin Urine Negative Negative    Ketones, Urine Negative Negative mg/dL    Specific Gravity, UA 1.015 1.005 - 1.030    Blood, Urine TRACE-INTACT Negative    pH, UA 6.0 5.0 - 9.0    Protein, UA 30 (A) Negative mg/dL    Urobilinogen, Urine 0.2 <2.0 E.U./dL    Nitrite, Urine Negative Negative    Leukocyte Esterase, Urine Negative Negative    Casts UA MODERATE /LPF    WBC, UA 5-10 0 - 5 /HPF    RBC, UA 0-1 0 - 2 /HPF    Bacteria, UA MODERATE (A) /HPF   Troponin   Result Value Ref Range    Troponin <0.01 0.00 - 0.03 ng/mL   Brain Natriuretic Peptide   Result Value Ref Range    Pro- 0 - 450 pg/mL   Blood Gas, Arterial   Result Value Ref Range    Date Analyzed [id number]     Time Analyzed 1639     Source: Blood Arterial     pH, Blood Gas 7.336 (L) 7.350 - 7.450    PCO2 38.2 35.0 - 45.0 mmHg    PO2 51.4 (L) 60.0 - 100.0 mmHg    HCO3 20.0 (L) 22.0 - 26.0 mmol/L    B.E. -5.2 (L) -3.0 - 3.0 mmol/L    O2 Sat 83.7 (L) 92.0 - 98.5 %    O2Hb 82.6 (L) 94.0 - 97.0 %    COHb 1.1 0.0 - 1.5 %    MetHb 0.2 0.0 - 1.5 %    O2 Content 20.3 mL/dL    HHb 16.1 (H) 0.0 - 5.0 %    tHb (est) 17.5 (H) 11.5 - 16.5 g/dL    Mode NC- 6 L     Date Of Collection      Time Collected      Pt Temp 37.0 C     ID [id number]     Lab [id number]     Critical(s) Notified .  No Critical Values    EKG 12 Lead   Result Value Ref Range    Ventricular Rate 68 BPM    Atrial Rate 68 BPM    P-R Interval 196 ms QRS Duration 86 ms    Q-T Interval 426 ms    QTc Calculation (Bazett) 452 ms    P Axis 52 degrees    R Axis 38 degrees    T Axis 51 degrees   TYPE AND SCREEN   Result Value Ref Range    ABO/Rh B POS     Antibody Screen NEG        RADIOLOGY:  CT ABDOMEN PELVIS W IV CONTRAST Additional Contrast? None   Final Result      CTA PULMONARY W CONTRAST   Final Result      Consolidation within the bilateral lower lobes posteriorly could   reflect atelectasis although infectious pneumonic infiltrate should be   considered. Fluid-filled esophagus throughout its course. No evidence for pulmonary embolism or aortic dissection. XR Acute Abd Series Chest 1 VW   Final Result      NO ACUTE CARDIOPULMONARY PROCESS      No evidence of any free air      Nonspecific gas pattern               ------------------------- NURSING NOTES AND VITALS REVIEWED ---------------------------  Date / Time Roomed:  6/11/2019  2:22 PM  ED Bed Assignment:  26/26    The nursing notes within the ED encounter and vital signs as below have been reviewed.      Patient Vitals for the past 24 hrs:   BP Temp Temp src Pulse Resp SpO2 Height Weight   06/11/19 1956 (!) 120/54 98.3 °F (36.8 °C) Oral 108 22 94 % -- --   06/11/19 1840 113/60 98.3 °F (36.8 °C) Oral 124 24 94 % -- --   06/11/19 1804 -- -- -- 128 24 93 % -- --   06/11/19 1729 (!) 136/59 -- -- 109 22 95 % -- --   06/11/19 1728 -- -- -- -- -- 95 % -- --   06/11/19 1727 -- -- -- -- -- 93 % -- --   06/11/19 1726 -- -- -- -- 20 91 % -- --   06/11/19 1628 120/62 98 °F (36.7 °C) Oral 97 16 (!) 87 % -- --   06/11/19 1433 -- 97.4 °F (36.3 °C) Oral -- -- -- -- --   06/11/19 1429 102/60 -- -- 71 (!) 48 -- 5' 7\" (1.702 m) 196 lb (88.9 kg)       Oxygen Saturation Interpretation: Abnormal, improved with treatment.     ------------------------------------------ PROGRESS NOTES ------------------------------------------  Re-evaluation(s):  Time: 1630  Patients symptoms are improving  Repeat physical examination is not changed    Counseling:  I have spoken with the patient and discussed todays results, in addition to providing specific details for the plan of care and counseling regarding the diagnosis and prognosis. Their questions are answered at this time and they are agreeable with the plan of admission.    --------------------------------- ADDITIONAL PROVIDER NOTES ---------------------------------  Consultations:  Dr. Yovanny Castro with Dr. Monica Camarena. Discussed case. They will admit the patient. This patient's ED course included: a personal history and physicial examination, re-evaluation prior to disposition, multiple bedside re-evaluations, IV medications, cardiac monitoring, continuous pulse oximetry and complex medical decision making and emergency management    This patient has remained hemodynamically stable during their ED course. Diagnosis:  1. Small bowel obstruction ([address]    2. Acute respiratory failure with hypoxia (HCC)    3. Pneumonia due to organism    4. Lactic acidosis        Disposition:  Patient's disposition: Admit to telemetry  Patient's condition is serious.          Yogesh Grace DO  Resident  06/11/19 2001

## 2019-06-11 NOTE — ED NOTES
Bed: 26  Expected date:   Expected time:   Means of arrival:   Comments:  spring     Jose Rojas RN  06/11/19 0744

## 2019-06-11 NOTE — PROGRESS NOTES
Attempted to give breathing treatments, transport was in the room taking patient to CT. Will follow up when patient returns.

## 2019-06-12 ENCOUNTER — APPOINTMENT (OUTPATIENT)
Dept: GENERAL RADIOLOGY | Age: 79
DRG: 871 | End: 2019-06-12
Payer: MEDICARE

## 2019-06-12 ENCOUNTER — APPOINTMENT (OUTPATIENT)
Dept: CT IMAGING | Age: 79
DRG: 871 | End: 2019-06-12
Payer: MEDICARE

## 2019-06-12 LAB
ANION GAP SERPL CALCULATED.3IONS-SCNC: 11 MMOL/L (ref 7–16)
BASOPHILS ABSOLUTE: 0.02 E9/L (ref 0–0.2)
BASOPHILS RELATIVE PERCENT: 0.3 % (ref 0–2)
BUN BLDV-MCNC: 24 MG/DL (ref 8–23)
CALCIUM SERPL-MCNC: 8.2 MG/DL (ref 8.6–10.2)
CHLORIDE BLD-SCNC: 108 MMOL/L (ref 98–107)
CO2: 21 MMOL/L (ref 22–29)
CREAT SERPL-MCNC: 1.3 MG/DL (ref 0.7–1.2)
EOSINOPHILS ABSOLUTE: 0.02 E9/L (ref 0.05–0.5)
EOSINOPHILS RELATIVE PERCENT: 0.3 % (ref 0–6)
GFR AFRICAN AMERICAN: >60
GFR NON-AFRICAN AMERICAN: 53 ML/MIN/1.73
GLUCOSE BLD-MCNC: 137 MG/DL (ref 74–99)
HCT VFR BLD CALC: 41.5 % (ref 37–54)
HEMOGLOBIN: 13.9 G/DL (ref 12.5–16.5)
IMMATURE GRANULOCYTES #: 0.02 E9/L
IMMATURE GRANULOCYTES %: 0.3 % (ref 0–5)
LACTIC ACID: 1.9 MMOL/L (ref 0.5–2.2)
LACTIC ACID: 3 MMOL/L (ref 0.5–2.2)
LYMPHOCYTES ABSOLUTE: 1.04 E9/L (ref 1.5–4)
LYMPHOCYTES RELATIVE PERCENT: 14.8 % (ref 20–42)
MCH RBC QN AUTO: 31.6 PG (ref 26–35)
MCHC RBC AUTO-ENTMCNC: 33.5 % (ref 32–34.5)
MCV RBC AUTO: 94.3 FL (ref 80–99.9)
MONOCYTES ABSOLUTE: 0.71 E9/L (ref 0.1–0.95)
MONOCYTES RELATIVE PERCENT: 10.1 % (ref 2–12)
NEUTROPHILS ABSOLUTE: 5.2 E9/L (ref 1.8–7.3)
NEUTROPHILS RELATIVE PERCENT: 74.2 % (ref 43–80)
PDW BLD-RTO: 12.4 FL (ref 11.5–15)
PLATELET # BLD: 175 E9/L (ref 130–450)
PMV BLD AUTO: 10.1 FL (ref 7–12)
POTASSIUM SERPL-SCNC: 4.4 MMOL/L (ref 3.5–5)
PROCALCITONIN: 6.95 NG/ML (ref 0–0.08)
RBC # BLD: 4.4 E12/L (ref 3.8–5.8)
SODIUM BLD-SCNC: 140 MMOL/L (ref 132–146)
WBC # BLD: 7 E9/L (ref 4.5–11.5)

## 2019-06-12 PROCEDURE — 74176 CT ABD & PELVIS W/O CONTRAST: CPT

## 2019-06-12 PROCEDURE — 83605 ASSAY OF LACTIC ACID: CPT

## 2019-06-12 PROCEDURE — 2060000000 HC ICU INTERMEDIATE R&B

## 2019-06-12 PROCEDURE — 6360000002 HC RX W HCPCS: Performed by: STUDENT IN AN ORGANIZED HEALTH CARE EDUCATION/TRAINING PROGRAM

## 2019-06-12 PROCEDURE — 36415 COLL VENOUS BLD VENIPUNCTURE: CPT

## 2019-06-12 PROCEDURE — 6370000000 HC RX 637 (ALT 250 FOR IP): Performed by: INTERNAL MEDICINE

## 2019-06-12 PROCEDURE — 6360000004 HC RX CONTRAST MEDICATION: Performed by: RADIOLOGY

## 2019-06-12 PROCEDURE — 80048 BASIC METABOLIC PNL TOTAL CA: CPT

## 2019-06-12 PROCEDURE — 85025 COMPLETE CBC W/AUTO DIFF WBC: CPT

## 2019-06-12 PROCEDURE — 2580000003 HC RX 258: Performed by: STUDENT IN AN ORGANIZED HEALTH CARE EDUCATION/TRAINING PROGRAM

## 2019-06-12 PROCEDURE — 94664 DEMO&/EVAL PT USE INHALER: CPT

## 2019-06-12 PROCEDURE — C9113 INJ PANTOPRAZOLE SODIUM, VIA: HCPCS | Performed by: STUDENT IN AN ORGANIZED HEALTH CARE EDUCATION/TRAINING PROGRAM

## 2019-06-12 PROCEDURE — 2700000000 HC OXYGEN THERAPY PER DAY

## 2019-06-12 PROCEDURE — 2580000003 HC RX 258: Performed by: INTERNAL MEDICINE

## 2019-06-12 PROCEDURE — 74018 RADEX ABDOMEN 1 VIEW: CPT

## 2019-06-12 PROCEDURE — 84145 PROCALCITONIN (PCT): CPT

## 2019-06-12 PROCEDURE — 6370000000 HC RX 637 (ALT 250 FOR IP): Performed by: STUDENT IN AN ORGANIZED HEALTH CARE EDUCATION/TRAINING PROGRAM

## 2019-06-12 PROCEDURE — 6360000002 HC RX W HCPCS: Performed by: INTERNAL MEDICINE

## 2019-06-12 PROCEDURE — 87450 HC DIRECT STREP B ANTIGEN: CPT

## 2019-06-12 PROCEDURE — 94640 AIRWAY INHALATION TREATMENT: CPT

## 2019-06-12 RX ORDER — LIDOCAINE HYDROCHLORIDE 20 MG/ML
JELLY TOPICAL ONCE
Status: DISCONTINUED | OUTPATIENT
Start: 2019-06-12 | End: 2019-06-16 | Stop reason: HOSPADM

## 2019-06-12 RX ORDER — PANTOPRAZOLE SODIUM 40 MG/10ML
40 INJECTION, POWDER, LYOPHILIZED, FOR SOLUTION INTRAVENOUS 2 TIMES DAILY
Status: DISCONTINUED | OUTPATIENT
Start: 2019-06-12 | End: 2019-06-15

## 2019-06-12 RX ORDER — 0.9 % SODIUM CHLORIDE 0.9 %
10 VIAL (ML) INJECTION 2 TIMES DAILY
Status: DISCONTINUED | OUTPATIENT
Start: 2019-06-12 | End: 2019-06-15

## 2019-06-12 RX ORDER — OXYMETAZOLINE HYDROCHLORIDE 0.05 G/100ML
2 SPRAY NASAL ONCE
Status: COMPLETED | OUTPATIENT
Start: 2019-06-12 | End: 2019-06-12

## 2019-06-12 RX ORDER — SODIUM CHLORIDE 9 MG/ML
INJECTION, SOLUTION INTRAVENOUS CONTINUOUS
Status: DISCONTINUED | OUTPATIENT
Start: 2019-06-12 | End: 2019-06-15

## 2019-06-12 RX ORDER — LORAZEPAM 2 MG/ML
0.5 INJECTION INTRAMUSCULAR
Status: COMPLETED | OUTPATIENT
Start: 2019-06-12 | End: 2019-06-12

## 2019-06-12 RX ADMIN — IPRATROPIUM BROMIDE AND ALBUTEROL SULFATE 1 AMPULE: .5; 3 SOLUTION RESPIRATORY (INHALATION) at 16:29

## 2019-06-12 RX ADMIN — ENOXAPARIN SODIUM 40 MG: 40 INJECTION SUBCUTANEOUS at 20:49

## 2019-06-12 RX ADMIN — IPRATROPIUM BROMIDE AND ALBUTEROL SULFATE 1 AMPULE: .5; 3 SOLUTION RESPIRATORY (INHALATION) at 12:17

## 2019-06-12 RX ADMIN — IPRATROPIUM BROMIDE AND ALBUTEROL SULFATE 1 AMPULE: .5; 3 SOLUTION RESPIRATORY (INHALATION) at 20:30

## 2019-06-12 RX ADMIN — Medication 2 SPRAY: at 18:29

## 2019-06-12 RX ADMIN — LORAZEPAM 0.5 MG: 2 INJECTION INTRAMUSCULAR; INTRAVENOUS at 00:28

## 2019-06-12 RX ADMIN — PANTOPRAZOLE SODIUM 40 MG: 40 INJECTION, POWDER, FOR SOLUTION INTRAVENOUS at 20:48

## 2019-06-12 RX ADMIN — Medication 10 ML: at 20:48

## 2019-06-12 RX ADMIN — AMPICILLIN SODIUM AND SULBACTAM SODIUM 3 G: 2; 1 INJECTION, POWDER, FOR SOLUTION INTRAMUSCULAR; INTRAVENOUS at 20:48

## 2019-06-12 RX ADMIN — CEFTRIAXONE 1 G: 1 INJECTION, POWDER, FOR SOLUTION INTRAMUSCULAR; INTRAVENOUS at 00:29

## 2019-06-12 RX ADMIN — Medication 10 ML: at 10:15

## 2019-06-12 RX ADMIN — SODIUM CHLORIDE: 9 INJECTION, SOLUTION INTRAVENOUS at 14:31

## 2019-06-12 RX ADMIN — AMPICILLIN SODIUM AND SULBACTAM SODIUM 3 G: 2; 1 INJECTION, POWDER, FOR SOLUTION INTRAMUSCULAR; INTRAVENOUS at 14:28

## 2019-06-12 RX ADMIN — IOHEXOL 50 ML: 240 INJECTION, SOLUTION INTRATHECAL; INTRAVASCULAR; INTRAVENOUS; ORAL at 09:39

## 2019-06-12 RX ADMIN — PANTOPRAZOLE SODIUM 40 MG: 40 INJECTION, POWDER, FOR SOLUTION INTRAVENOUS at 10:15

## 2019-06-12 ASSESSMENT — PAIN SCALES - GENERAL
PAINLEVEL_OUTOF10: 0

## 2019-06-12 NOTE — CONSULTS
GENERAL SURGERY  CONSULT NOTE  6/12/2019    Physician Consulted: Dr. Mike Ruggiero  Reason for Consult: abd pain, nausea, vomiting   Referring Physician: Dr. Deyanira Brooks is a 77 y.o. male who presents for evaluation of abdominal pain nausea and vomiting. He states that he started having abdominal pain ongoing yesterday afternoon associated with nausea and vomiting of black material. Denies blood. Denies black or bloody bowel movements. He has not had anything like this before. Last BM yesterday, continues to pass flatus. Past Medical History:   Diagnosis Date    CVA (cerebral vascular accident) ([facility] 2007    Heart murmur     Osteoarthritis     Prostate cancer ([facility] [facility] 2007       Past Surgical History:   Procedure Laterality Date    CATARACT REMOVAL      HERNIA REPAIR      PROSTATE SURGERY      beacons/radiation       Medications Prior to Admission:    Prior to Admission medications    Medication Sig Start Date End Date Taking?  Authorizing Provider   Omega-3 Fatty Acids (FISH OIL) 1000 MG CAPS Take 1,000 mg by mouth every morning   Yes Historical Provider, MD   Multiple Vitamins-Minerals (THERAPEUTIC MULTIVITAMIN-MINERALS) tablet Take 1 tablet by mouth every morning   Yes Historical Provider, MD   vitamin E 400 UNIT capsule Take 400 Units by mouth every morning  12/9/09  Yes Historical Provider, MD   donepezil (ARICEPT) 10 MG tablet 1/2 tab twice daily  Patient taking differently: Take 5 mg by mouth 2 times daily  3/20/19  Yes LIZETH Abdi CNP   memantine (NAMENDA) 10 MG tablet Take 1 tablet by mouth 2 times daily 10/17/18  Yes LIZETH Abdi CNP   pantoprazole (PROTONIX) 40 MG tablet Take 40 mg by mouth every morning  3/12/18  Yes Historical Provider, MD   atorvastatin (LIPITOR) 40 MG tablet Take 40 mg by mouth every morning    Yes Historical Provider, MD   amLODIPine (NORVASC) 10 MG tablet Take 10 mg by mouth every morning    Yes Historical Provider, MD   vitamin B-12 (CYANOCOBALAMIN) 500 MCG tablet Take 500 mcg by mouth every morning    Yes Historical Provider, MD   aspirin 81 MG chewable tablet Take 1 tablet by mouth daily  Patient taking differently: Take 81 mg by mouth every morning  17  Yes LIZETH Booth - CNP   diazepam (VALIUM) 5 MG tablet Take 5 mg by mouth every morning. 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    Drug use: Never         Review of Systems   General ROS: positive for  - malaise  Hematological and Lymphatic ROS: negative  Respiratory ROS: positive for - shortness of breath  Cardiovascular ROS: no chest pain or dyspnea on exertion  Gastrointestinal ROS: positive for - abdominal pain, appetite loss, gas/bloating and nausea/vomiting  Genito-Urinary ROS: no dysuria, trouble voiding, or hematuria  Musculoskeletal ROS: negative      PHYSICAL EXAM:    Vitals:    19 2040   BP: (!) 111/57   Pulse: 114   Resp: 20   Temp: 99.7 °F (37.6 °C)   SpO2: 95%       General Appearance:  awake, alert, oriented, in no acute distress  Skin:  Skin color, texture, turgor normal. No rashes or lesions. Head/face:  NCAT  Eyes:  No gross abnormalities. Lungs:  Normal expansion. Clear to auscultation. No rales, rhonchi, or wheezing.   Heart:  Heart regular rate and rhythm  Abdomen:  Soft, distended, tympanic, nntender  Extremities: Extremities warm to touch, pink, with no edema. LABS:    CBC  Recent Labs     19  0330   WBC 7.0   HGB 13.9   HCT 41.5        BMP  Recent Labs     19  0330      K 4.4   *   CO2 21*   BUN 24*   CREATININE 1.3*   CALCIUM 8.2*     Liver Function  Recent Labs     19  1446   LIPASE 21   BILITOT 0.9   AST 23   ALT 15   ALKPHOS 116   PROT 6.6   LABALBU 4.2     RADIOLOGY    Xr Acute Abd Series Chest 1 Vw    Result Date: 2019  Patient MRN: [id number] : 1940 Age:  77 years Gender: Male Order Date: 2019 2:45 PM Exam: XR ACUTE ABD SERIES CHEST 1 VW Number of Images: 5 views Indication:   pain, r/o free air pain, r/o free air Comparison: None. Findings: The lungs demonstrate ill-defined pleural calcification more pronounced on the left as compared to the right. This is most pronounced in the left lung base also in the left lateral thorax. . There is no evidence of pulmonary infiltrate or pleural effusion. The pulmonary vascularity is unremarkable. The cardiac, hilar and mediastinal silhouettes are satisfactory. The bony thorax demonstrates no gross abnormality. Abdomen demonstrated the gas pattern to be nonspecific. There is no evidence of any free air there is osteoarthritic changes of the lumbar spine with mild levoscoliosis. Patient is status post brachytherapy with multiple seeds seen in the prostate. NO ACUTE CARDIOPULMONARY PROCESS No evidence of any free air Nonspecific gas pattern     Ct Abdomen Pelvis W Iv Contrast Additional Contrast? None    Result Date: 2019  This examination and all examinations utilizing ionizing radiation at this facility are done so according to the ALARA (as low as reasonably achievable) principal for radiation dose reduction. Axial, sagittal, and coronal computed tomography of the abdomen and pelvis was performed following intravenous administration of 110 mL of Isovue 3 7.  Consolidation within the bilateral lung bases may reflect atelectasis although pneumonic infectious infiltrate should be excluded. There are calcified pleural plaques bilaterally. The heart is not enlarged. There is fluid throughout the lower esophagus into the stomach. In the abdomen, 12 mm low-density lesion within the central portion of the liver is probably a benign cyst or hemangioma. The liver is otherwise unremarkable gallbladder and hepatobiliary tree or mild atrophy of the otherwise normal-appearing pancreas. Spleen and is a few small splenules are normal in appearance. Adrenal glands show no evidence of thickening or nodule. The kidneys are negative for evidence of solid mass or hydronephrosis. Within the right lower quadrant of the abdomen, there is no evidence for appendicitis. Loops of bowel within the left mid abdomen exceed 3 cm in cross-section. No evidence of free air or free fluid. Transition to normal caliber is within the mid abdomen. Within the pelvis, urinary bladder is unremarkable. There are radiation beads within the prostate gland. No free fluid or adenopathy in the pelvis Degenerative spondylosis and facet arthropathy are identified within the spine. Osteophytosis and eburnation of sacroiliac joints and hips. The skeletal structures are negative for evidence of aggressive process or acute fracture. IMPRESSIONS: Small bowel obstruction versus ileus. Consolidation within the bilateral lung bases may reflect infectious pneumonia or atelectasis. Cta Pulmonary W Contrast    Result Date: 6/11/2019  Clinical indications: Shortness of breath. Right upper quadrant pain. Hypoxia. COMPARISON: Initial radiographs earlier today. Exposure control: This examination and all examinations utilizing ionizing radiation at this facility done so according to the ALARA (as low as reasonably achievable) principal for radiation dose reduction.  Technique: Axial, sagittal, and coronal computed tomography of the chest was performed following intravenous administration of 90 mL of Isovue-370. 3-D postprocessing. Three-dimensional reconstructions of the arterial tree. MIP imaging. FINDINGS: Evaluation of the pulmonary arterial tree reveals no intraluminal filling defect to suggest embolic phenomenon. There is no evidence of aortic dissection. Within the thoracic inlet, the thyroid gland is unremarkable. The major airways are patent. There is no mediastinal or hilar mass or lymphadenopathy. The heart is not enlarged. There is no evidence of pericardial fluid. Esophagus is fluid-filled throughout its course within the chest and into the stomach. Evaluation of the lung parenchyma reveals consolidation within the bilateral lower lobes posteriorly which could reflect atelectasis although infectious pneumonic infiltrate should be considered. There are calcified pleural plaques along the bilateral posterior costophrenic angles and the anterior upper lobes bilaterally. There is scarring of the apices. The lungs are otherwise negative for dominant mass or airspace consolidation. Within the upper abdomen, there is no evidence for acute or worrisome process. Skeletal structures are negative for evidence of aggressive process or acute fracture. Consolidation within the bilateral lower lobes posteriorly could reflect atelectasis although infectious pneumonic infiltrate should be considered. Fluid-filled esophagus throughout its course. No evidence for pulmonary embolism or aortic dissection.          ASSESSMENT:  77 y.o. male with pSBO, hyposia, possible UGIB    PLAN:  NPO  IVF  PPI  CT scan with PO contrast to eval SBO  Will likely require EGD once hypoxia improves ton eval for coffee ground emesis   D/w Dr. Pau Billy    Electronically signed by Adam Box DO on 6/12/19 at 5:52 AM

## 2019-06-12 NOTE — PROGRESS NOTES
Called [facility] @ [phone] left message on voicemail for Kenia Lopez (Tag Team 2) Nurse Estela Shorten  regarding Mr. Rowland's PFT and left our fax number as well as our phone number if they need us to fax authorization (which patient signed and is in his chart)  MELIDA Pierre RN informed of same. 12:46 PM    Kenia Lopez from [place] called back and gave me fax # [phone] to send authorization. She had record of PFT's done in 2017.

## 2019-06-12 NOTE — PROGRESS NOTES
Database complete. Medications reconciled. Care plans and education initiated. Neurologist is Dr. Elenita Lozada and NP Adela Lucio. Known to [address] [place] [address] (CYNTHIA Martel) Urologist is Dr. María Silverman.

## 2019-06-12 NOTE — CARE COORDINATION
Attempted to meet with pt at bedside; sleeping; no family in room; will re- attempt at a later time. Mc Neely RN,CM.

## 2019-06-12 NOTE — CONSULTS
Angella Hitchcock M.D.,[facility]  Guerrero Rondon D.O., FYEE., Sharron French M.D. Bill Marina M.D., Paula Iniguez M.D. Patient:  Ruth Jiménez 77 y.o. male MRN: [id number]     Date of Service: 6/12/2019      PULMONARY CONSULTATION    Reason for Consultation: possible pneumonia  Referring Physician: Dr. Jesus Gold with the referring physician will be sent via the electronic medical record. Chief Complaint: no complaints currently    CODE STATUS: Full    SUBJECTIVE:  HPI:  Ruth Jiménez is a 77 y.o. male with a history of dementia, CVA, osteoarthritis, prior prostate surgery and hernia repair who presented to the ED with complaint of dark colored vomiting and abdominal pain. He was very weak and was brought to the ED via ambulance. The patient was hypoxic in the ED dropping as low as 87% on 2 L O2 per nasal cannula. He received IV fluids for borderline low BP of 102/62. Mildy elevated Cr of 1.4. Lactic 5.4.. Pro-BNP not elevated. ABG showed PO2 of 51.4, pH 7.33, PCO2 38.2, HCO3 20. UA showed 5-10 WBCs and moderate bacteria. Acute abdominal series unremarkable. CTA showed atelectasis vs infiltrate, but no PE. CT abdomen/pelvis showed ileus vs obstruction. Patient was also given PPI, analgesics, zofran, duonebs, GI cocktail, and vancomycin and Merrem. Patient was admitted for further workup and treatment. The patient is currently sitting in bed calmly and in no acute distress. Mild confusion due to his dementia. He is on 14 L O2 per high flow nasal cannula. He is 94 % O2 saturation on this level of O2. The patient reports he smoked about 1.5 packs of cigarettes per day for about 35 years, but quit 30 years ago. He spent a couple years working in "[employer] and many years working for [employer]. He does have a history of asbestos exposure and known pleural plaques on his lungs. He was recently prescribed Pro-Air. He is a [place] patient.  His wife thinks he has had pulmonary function tests previously and thinks she has his records at home. She will try and bring them in.        Past Medical History:   Diagnosis Date    [facility]A [facility]cerebral [facility]) ([facility]     Heart murmur     Osteoarthritis     Prostate cancer ([facility] [facility]        Past Surgical History:   Procedure Laterality Date    CATARACT REMOVAL      HERNIA REPAIR      PROSTATE SURGERY      beacons/radiation       Family History   Problem Relation Age of Onset    Heart Disease Father     Crohn's Disease Sister        Social History:   Social History     Socioeconomic History    Marital status:      Spouse name: Not on file    Number of children: Not on file    Years of education: Not on file    Highest education level: Not on file   Occupational History    Not on file   Social Needs    Financial resource strain: Not on file    Food insecurity:     Worry: Not on file     Inability: Not on file    Transportation needs:     Medical: Not on file     Non-medical: Not on file   Tobacco Use    Smoking status: Former Smoker     Types: Cigarettes     Start date: 1959     Last attempt to quit: 1987     Years since quittin.1    Smokeless tobacco: Never Used   Substance and Sexual Activity    Alcohol use: Never     Frequency: Never     Comment: 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    Drug use: Never    Sexual activity: Never   Lifestyle    Physical activity:     Days per week: Not on file     Minutes per session: Not on file    Stress: Not on file   Relationships    Social connections:     Talks on phone: Not on file     Gets together: Not on file     Attends Evangelical service: Not on file     Active member of club or organization: Not on file     Attends meetings of clubs or organizations: Not on file     Relationship status: Not on file    Intimate partner violence:     Fear of current or ex partner: Not on file     Emotionally abused: Not on file     Physically abused: Not on file Forced sexual activity: Not on file   Other Topics Concern    Not on file   Social History Narrative    Not on file     Smoking history: The patient is a Past smoker of 35 years. Pack year equal 52.5. ETOH:   reports that he does not drink alcohol. Exposures: There  is not history of TB or TB exposure. There is asbestos or silica dust exposure. The patient reports does not have coal, foundry, quarry or [employer] exposure. Recent travel history negative. There is not  history of recreational or IV drug use. There is not hot tub exposure. The patient does not have any exotic pets, turtles or exotic birds. Vaccines:    Influenza:  Not indicated  Pneumococcal Polysaccharide:  Up-to-date; approximate date: 3/6/2019  Immunization History   Administered Date(s) Administered    Influenza Vaccine, unspecified formulation 11/12/2018    Pneumococcal Vaccine, unspecified formulation 03/06/2019        Home Meds: Medications Prior to Admission: Omega-3 Fatty Acids (FISH OIL) 1000 MG CAPS, Take 1,000 mg by mouth every morning  Multiple Vitamins-Minerals (THERAPEUTIC MULTIVITAMIN-MINERALS) tablet, Take 1 tablet by mouth every morning  vitamin E 400 UNIT capsule, Take 400 Units by mouth every morning   donepezil (ARICEPT) 10 MG tablet, 1/2 tab twice daily (Patient taking differently: Take 5 mg by mouth 2 times daily )  memantine (NAMENDA) 10 MG tablet, Take 1 tablet by mouth 2 times daily  pantoprazole (PROTONIX) 40 MG tablet, Take 40 mg by mouth every morning   atorvastatin (LIPITOR) 40 MG tablet, Take 40 mg by mouth every morning   amLODIPine (NORVASC) 10 MG tablet, Take 10 mg by mouth every morning   vitamin B-12 (CYANOCOBALAMIN) 500 MCG tablet, Take 500 mcg by mouth every morning   aspirin 81 MG chewable tablet, Take 1 tablet by mouth daily (Patient taking differently: Take 81 mg by mouth every morning )  diazepam (VALIUM) 5 MG tablet, Take 5 mg by mouth every morning.      CURRENT MEDS :  Scheduled Meds:   pantoprazole  40 mg Intravenous BID    And    sodium chloride (PF)  10 mL Intravenous BID    ampicillin-sulbactam  3 g Intravenous Q6H    sodium chloride flush  10 mL Intravenous 2 times per day    enoxaparin  40 mg Subcutaneous Daily    ipratropium-albuterol  1 ampule Inhalation Q4H WA       Continuous Infusions:   sodium chloride 100 mL/hr at 06/12/19 1431       Allergies   Allergen Reactions    Oxycodone-Acetaminophen Nausea And Vomiting     Other reaction(s): GI Upset    Vicodin [Hydrocodone-Acetaminophen] Nausea And Vomiting       REVIEW OF SYSTEMS:  Constitutional: Denies fever, weight loss, night sweats, and fatigue  Skin: Denies pigmentation, dark lesions, and rashes   HEENT: Denies hearing loss, tinnitus, ear drainage, epistaxis, sore throat, and hoarseness. Cardiovascular: Denies palpitations, chest pain, and chest pressure. Respiratory: Denies cough, dyspnea at rest, hemoptysis, apnea, and choking. Gastrointestinal: Denies nausea, vomiting, poor appetite, diarrhea, heartburn or reflux  Genitourinary: Denies dysuria, frequency, urgency or hematuria  Musculoskeletal: Denies myalgias, muscle weakness, and bone pain  Neurological: Denies dizziness, vertigo, headache, and focal weakness  Psychological: Denies anxiety and depression  Endocrine: Denies heat intolerance and cold intolerance  Hematopoietic/Lymphatic: Denies bleeding problems and blood transfusions    OBJECTIVE:   /63   Pulse 101   Temp 100.1 °F (37.8 °C) (Axillary)   Resp 18   Ht 5' 7\" (1.702 m)   Wt 177 lb 14.4 oz (80.7 kg)   SpO2 92%   BMI 27.86 kg/m²   SpO2 Readings from Last 1 Encounters:   06/12/19 92%        I/O:    Intake/Output Summary (Last 24 hours) at 6/12/2019 1447  Last data filed at 6/12/2019 1302  Gross per 24 hour   Intake 570 ml   Output 1600 ml   Net -1030 ml                      Physical Exam:  General: The patient is lying in bed comfortably without any distress. Breathing is not labored.  High flow nasal cannula in place. HEENT: Pupils are equal round and reactive to light, there are no oral lesions and no post-nasal drip   Neck: supple without adenopathy  Cardiovascular: regular rate and rhythm without murmur or gallop  Respiratory: inspiratory crackles in lung bases bilaterally. No wheezes. Air entry is symmetric  Abdomen: soft, non-tender, non-distended, normal bowel sounds  Extremities: warm, no edema, no clubbing  Skin: no rash or lesion  Neurologic: CN II-XII grossly intact, no focal deficits        Imaging personally reviewed:  Clinical indications:       Shortness of breath. Right upper quadrant pain. Hypoxia.       COMPARISON:       Initial radiographs earlier today.       Exposure control:       This examination and all examinations utilizing ionizing radiation at   this facility done so according to the ALARA (as low as reasonably   achievable) principal for radiation dose reduction.       Technique:       Axial, sagittal, and coronal computed tomography of the chest was   performed following intravenous administration of 90 mL of Isovue-370.   3-D postprocessing. Three-dimensional reconstructions of the arterial   tree. MIP imaging.       FINDINGS:       Evaluation of the pulmonary arterial tree reveals no intraluminal   filling defect to suggest embolic phenomenon. There is no evidence of   aortic dissection.       Within the thoracic inlet, the thyroid gland is unremarkable. The   major airways are patent. There is no mediastinal or hilar mass or   lymphadenopathy. The heart is not enlarged. There is no evidence of   pericardial fluid. Esophagus is fluid-filled throughout its course   within the chest and into the stomach.       Evaluation of the lung parenchyma reveals consolidation within the   bilateral lower lobes posteriorly which could reflect atelectasis   although infectious pneumonic infiltrate should be considered.  There   are calcified pleural plaques along the bilateral posterior   costophrenic angles and the anterior upper lobes bilaterally. There is   scarring of the apices. The lungs are otherwise negative for dominant   mass or airspace consolidation.        Within the upper abdomen, there is no evidence for acute or worrisome   process.       Skeletal structures are negative for evidence of aggressive process or   acute fracture.           Impression       Consolidation within the bilateral lower lobes posteriorly could   reflect atelectasis although infectious pneumonic infiltrate should be   considered.       Fluid-filled esophagus throughout its course.       No evidence for pulmonary embolism or aortic dissection.         Patient MRN:  [id number]   : 1940   Age: 77 years   Gender: Male       Order Date:  2019 7:30 AM       EXAM: CT ABDOMEN PELVIS WO CONTRAST number of images 384   Contrast. 50 mL of Omnipaque 240 orally   Technique: Low-dose CT  acquisition technique included one of   following options; 1 . Automated exposure control, 2. Adjustment of MA   and or KV according to patient's size or 3. Use of iterative   reconstruction.       INDICATION:  eval pSBO         COMPARISON: 2019       FINDINGS:   The lung bases demonstrate persistent  patchy bibasilar infiltrates   and pleural effusions. There is mild cardiomegaly with coronary artery   calcification and calcified pleural plaques bilaterally. The liver is   of normal architecture. Contrast is identified in the gallbladder. The   spleen, pancreas, the adrenals and the kidneys are normal. There is   mild focal aneurysm of the abdominal aorta measuring 2.4 x 3.2 cm. There is severe degenerative changes in the lumbar spine.       There is persistent moderately dilated proximal small bowel loops   measuring up to 3.3 cm with a mild improvement in the gastric   distention. The distal small bowel loops and colon are partially   collapsed.  Nonspecific lymph nodes are identified in the mesentery probably inflammatory.       Pelvis. Bladder is collapsed with a Ralph catheter. Radiation seeds   are identified in the prostate gland. There is diverticulosis of colon   without diverticulitis. There is constipation. The appendix is noted.           Impression   Persistent small bowel dilatation with  moderately dilated proximal   small bowel loops and collapsed distal small bowel loops and colon. There is mild improvement in gastric distention.       Persistent infiltrates and pleural effusion in lung bases likely CHF    or pneumonia.       Mild abdominal aortic aneurysm.                     Echo: N/A      Labs:  Lab Results   Component Value Date    WBC 7.0 06/12/2019    HGB 13.9 06/12/2019    HCT 41.5 06/12/2019    MCV 94.3 06/12/2019    MCH 31.6 06/12/2019    MCHC 33.5 06/12/2019    RDW 12.4 06/12/2019     06/12/2019    MPV 10.1 06/12/2019     Lab Results   Component Value Date     06/12/2019    K 4.4 06/12/2019    K 4.3 06/11/2019     06/12/2019    CO2 21 06/12/2019    BUN 24 06/12/2019    CREATININE 1.3 06/12/2019    LABALBU 4.2 06/11/2019    CALCIUM 8.2 06/12/2019    GFRAA >60 06/12/2019    LABGLOM 53 06/12/2019     No results found for: PROTIME, INR  Recent Labs     06/11/19  1446   PROBNP 170     Recent Labs     06/11/19  1446   TROPONINI <0.01     No results for input(s): PROCAL in the last 72 hours. This SmartLink has not been configured with any valid records. Micro:  No results for input(s): CULTRESP in the last 72 hours. No results for input(s): LABGRAM in the last 72 hours. No results for input(s): LEGUR in the last 72 hours. No results for input(s): STREPNEUMAGU in the last 72 hours. No results for input(s): LP1UAG in the last 72 hours. Assessment:  1. Sepsis  2. Ileus vs bowel obstruction  3. Acute respiratory failure with hypoxia  4. Aspiration pneumonia vs chemical pneumonitis  5. Probable COPD  6. LIT  7. UTI  8. Asbestosis  9. Dementia    Plan:  1.  Change

## 2019-06-13 ENCOUNTER — APPOINTMENT (OUTPATIENT)
Dept: GENERAL RADIOLOGY | Age: 79
DRG: 871 | End: 2019-06-13
Payer: MEDICARE

## 2019-06-13 LAB
ANION GAP SERPL CALCULATED.3IONS-SCNC: 11 MMOL/L (ref 7–16)
BASOPHILS ABSOLUTE: 0.01 E9/L (ref 0–0.2)
BASOPHILS RELATIVE PERCENT: 0.1 % (ref 0–2)
BUN BLDV-MCNC: 22 MG/DL (ref 8–23)
CALCIUM SERPL-MCNC: 8.3 MG/DL (ref 8.6–10.2)
CHLORIDE BLD-SCNC: 106 MMOL/L (ref 98–107)
CO2: 23 MMOL/L (ref 22–29)
CREAT SERPL-MCNC: 1.3 MG/DL (ref 0.7–1.2)
EOSINOPHILS ABSOLUTE: 0.14 E9/L (ref 0.05–0.5)
EOSINOPHILS RELATIVE PERCENT: 2 % (ref 0–6)
GFR AFRICAN AMERICAN: >60
GFR NON-AFRICAN AMERICAN: 53 ML/MIN/1.73
GLUCOSE BLD-MCNC: 108 MG/DL (ref 74–99)
HCT VFR BLD CALC: 38.5 % (ref 37–54)
HEMOGLOBIN: 13.3 G/DL (ref 12.5–16.5)
IMMATURE GRANULOCYTES #: 0.03 E9/L
IMMATURE GRANULOCYTES %: 0.4 % (ref 0–5)
L. PNEUMOPHILA SEROGP 1 UR AG: NORMAL
LYMPHOCYTES ABSOLUTE: 1.04 E9/L (ref 1.5–4)
LYMPHOCYTES RELATIVE PERCENT: 14.9 % (ref 20–42)
MCH RBC QN AUTO: 32.8 PG (ref 26–35)
MCHC RBC AUTO-ENTMCNC: 34.5 % (ref 32–34.5)
MCV RBC AUTO: 94.8 FL (ref 80–99.9)
MONOCYTES ABSOLUTE: 0.66 E9/L (ref 0.1–0.95)
MONOCYTES RELATIVE PERCENT: 9.5 % (ref 2–12)
NEUTROPHILS ABSOLUTE: 5.08 E9/L (ref 1.8–7.3)
NEUTROPHILS RELATIVE PERCENT: 73.1 % (ref 43–80)
PDW BLD-RTO: 12.1 FL (ref 11.5–15)
PLATELET # BLD: 130 E9/L (ref 130–450)
PMV BLD AUTO: 9.7 FL (ref 7–12)
POTASSIUM SERPL-SCNC: 4.3 MMOL/L (ref 3.5–5)
RBC # BLD: 4.06 E12/L (ref 3.8–5.8)
SODIUM BLD-SCNC: 140 MMOL/L (ref 132–146)
STREP PNEUMONIAE ANTIGEN, URINE: NORMAL
WBC # BLD: 7 E9/L (ref 4.5–11.5)

## 2019-06-13 PROCEDURE — 85025 COMPLETE CBC W/AUTO DIFF WBC: CPT

## 2019-06-13 PROCEDURE — 6360000002 HC RX W HCPCS: Performed by: INTERNAL MEDICINE

## 2019-06-13 PROCEDURE — 6360000002 HC RX W HCPCS: Performed by: STUDENT IN AN ORGANIZED HEALTH CARE EDUCATION/TRAINING PROGRAM

## 2019-06-13 PROCEDURE — 94761 N-INVAS EAR/PLS OXIMETRY MLT: CPT

## 2019-06-13 PROCEDURE — 92526 ORAL FUNCTION THERAPY: CPT | Performed by: SPEECH-LANGUAGE PATHOLOGIST

## 2019-06-13 PROCEDURE — 2700000000 HC OXYGEN THERAPY PER DAY

## 2019-06-13 PROCEDURE — 2060000000 HC ICU INTERMEDIATE R&B

## 2019-06-13 PROCEDURE — 6370000000 HC RX 637 (ALT 250 FOR IP): Performed by: INTERNAL MEDICINE

## 2019-06-13 PROCEDURE — 97161 PT EVAL LOW COMPLEX 20 MIN: CPT

## 2019-06-13 PROCEDURE — 74230 X-RAY XM SWLNG FUNCJ C+: CPT

## 2019-06-13 PROCEDURE — 97165 OT EVAL LOW COMPLEX 30 MIN: CPT

## 2019-06-13 PROCEDURE — C9113 INJ PANTOPRAZOLE SODIUM, VIA: HCPCS | Performed by: STUDENT IN AN ORGANIZED HEALTH CARE EDUCATION/TRAINING PROGRAM

## 2019-06-13 PROCEDURE — 2580000003 HC RX 258: Performed by: INTERNAL MEDICINE

## 2019-06-13 PROCEDURE — 36415 COLL VENOUS BLD VENIPUNCTURE: CPT

## 2019-06-13 PROCEDURE — 80048 BASIC METABOLIC PNL TOTAL CA: CPT

## 2019-06-13 PROCEDURE — 94640 AIRWAY INHALATION TREATMENT: CPT

## 2019-06-13 PROCEDURE — 74018 RADEX ABDOMEN 1 VIEW: CPT

## 2019-06-13 PROCEDURE — 92611 MOTION FLUOROSCOPY/SWALLOW: CPT | Performed by: SPEECH-LANGUAGE PATHOLOGIST

## 2019-06-13 PROCEDURE — 2580000003 HC RX 258: Performed by: STUDENT IN AN ORGANIZED HEALTH CARE EDUCATION/TRAINING PROGRAM

## 2019-06-13 RX ADMIN — ENOXAPARIN SODIUM 40 MG: 40 INJECTION SUBCUTANEOUS at 20:05

## 2019-06-13 RX ADMIN — Medication 10 ML: at 20:06

## 2019-06-13 RX ADMIN — SODIUM CHLORIDE: 9 INJECTION, SOLUTION INTRAVENOUS at 03:01

## 2019-06-13 RX ADMIN — Medication 10 ML: at 08:45

## 2019-06-13 RX ADMIN — AMPICILLIN SODIUM AND SULBACTAM SODIUM 3 G: 2; 1 INJECTION, POWDER, FOR SOLUTION INTRAMUSCULAR; INTRAVENOUS at 14:29

## 2019-06-13 RX ADMIN — Medication 10 ML: at 20:05

## 2019-06-13 RX ADMIN — IPRATROPIUM BROMIDE AND ALBUTEROL SULFATE 1 AMPULE: .5; 3 SOLUTION RESPIRATORY (INHALATION) at 07:29

## 2019-06-13 RX ADMIN — IPRATROPIUM BROMIDE AND ALBUTEROL SULFATE 1 AMPULE: .5; 3 SOLUTION RESPIRATORY (INHALATION) at 16:00

## 2019-06-13 RX ADMIN — AMPICILLIN SODIUM AND SULBACTAM SODIUM 3 G: 2; 1 INJECTION, POWDER, FOR SOLUTION INTRAMUSCULAR; INTRAVENOUS at 20:06

## 2019-06-13 RX ADMIN — SODIUM CHLORIDE: 9 INJECTION, SOLUTION INTRAVENOUS at 17:15

## 2019-06-13 RX ADMIN — PANTOPRAZOLE SODIUM 40 MG: 40 INJECTION, POWDER, FOR SOLUTION INTRAVENOUS at 20:06

## 2019-06-13 RX ADMIN — AMPICILLIN SODIUM AND SULBACTAM SODIUM 3 G: 2; 1 INJECTION, POWDER, FOR SOLUTION INTRAMUSCULAR; INTRAVENOUS at 03:00

## 2019-06-13 RX ADMIN — PANTOPRAZOLE SODIUM 40 MG: 40 INJECTION, POWDER, FOR SOLUTION INTRAVENOUS at 08:45

## 2019-06-13 RX ADMIN — AMPICILLIN SODIUM AND SULBACTAM SODIUM 3 G: 2; 1 INJECTION, POWDER, FOR SOLUTION INTRAMUSCULAR; INTRAVENOUS at 08:44

## 2019-06-13 RX ADMIN — IPRATROPIUM BROMIDE AND ALBUTEROL SULFATE 1 AMPULE: .5; 3 SOLUTION RESPIRATORY (INHALATION) at 11:37

## 2019-06-13 RX ADMIN — IPRATROPIUM BROMIDE AND ALBUTEROL SULFATE 1 AMPULE: .5; 3 SOLUTION RESPIRATORY (INHALATION) at 20:16

## 2019-06-13 ASSESSMENT — PAIN SCALES - GENERAL
PAINLEVEL_OUTOF10: 0
PAINLEVEL_OUTOF10: 0

## 2019-06-13 NOTE — PLAN OF CARE
Problem: Falls - Risk of:  Goal: Will remain free from falls  Description  Will remain free from falls  Outcome: Met This Shift     Problem: Falls - Risk of:  Goal: Absence of physical injury  Description  Absence of physical injury  Outcome: Met This Shift     Problem: Pain:  Goal: Pain level will decrease  Description  Pain level will decrease     Outcome: Met This Shift     Problem: Pain:  Goal: Pain level will decrease  Description  Pain level will decrease     Outcome: Met This Shift     Problem: Gas Exchange - Impaired:  Goal: Able to breathe comfortably  Description  Able to breathe comfortably     Outcome: Met This Shift

## 2019-06-13 NOTE — PROGRESS NOTES
SPEECH/LANGUAGE PATHOLOGY  VIDEOFLUOROSCOPIC STUDY OF SWALLOWING (MBS)      PATIENT NAME:  Renan Blount      :  1940      TODAY'S DATE:  2019    SUMMARY OF EVALUATION     DYSPHAGIA DIAGNOSIS:  Moderate pharyngeal dysphagia-no aspiration observed, however pt at risk without compensatory strategies due to delayed pharyngeal swallow and marked pharyngeal residue in pyriform sinuses.        DIET RECOMMENDATIONS: regular, thin liquids with use of compensatory strategies     FEEDING RECOMMENDATIONS:     Assistance level:  No assistance needed      Compensatory strategies recommended: Double swallow, Small bites/sips and Alternate solids and liquids    THERAPY RECOMMENDATIONS:      Dysphagia therapy is recommended 3-5 times per week with emphasis on the following     To improve tongue base retraction, To address cricopharyngeal dysfunction (Shaker exercises) and To monitor oral intake during meals and make recommendations for diet changes as appropriate                  PROCEDURE     Consistencies Administered During the Evaluation   Liquids: thin liquid and nectar thick liquid   Solids:  pureed foods and solid foods      Method of Intake:   cup, straw, spoon  Self fed, Fed by clinician      Position:   Seated, upright, Lateral plane    Current Respiratory Status                   RESULTS     ORAL STAGE       The oral stage of swallowing was within functional limits        PHARYNGEAL STAGE           ONSET TIME     Delayed initiation of the pharyngeal swallow was noted with swallow reflex triggered at the level of the pyriform sinus       PHARYNGEAL RESIDUALS          Vallecula/Pharyngeal Wall           Reduced tongue base retraction resulting in min residuals in vallecula and/or posterior pharyngeal wall for all consistencies administered which cleared with cued double swallow      Pyriform Sinuses      Residuals in the pyriform sinuses were noted due to cricopharyngeal dysfunction for all consistencies administered which mostly cleared with cued double swallow    LARYNGEAL PENETRATION     Laryngeal penetration occurred in the absence of aspiration DURING the swallow for thin liquid due to  delayed laryngeal closurewhich cleared from the laryngeal vestibule spontaneously (transient). Laryngeal penetration was minimal and occurred inconsistently  and an absent cough/throat clear was noted                 ASPIRATION    Aspiration was not present during this evaluation         COMPENSATORY STRATEGIES       Compensatory strategies that were beneficial included Double swallow, Small bites/sips and Alternate solids and liquids      STRUCTURAL/FUNCTIONAL ANOMALIES       No structural/functional anomalies were noted      CERVICAL ESOPHAGEAL STAGE :        The cervical esophagus appeared adequate                              The Speech Language Pathologist (SLP) completed education with the patient regarding results of evaluation. INTERVENTION/EDUCATION    Pt educated on MBS results and POC including visuals. Pt trained on comp strats for safe swallow. Pt verbalized understanding. Questions answered. [x]The admitting diagnosis and active problem list, as listed below have been reviewed prior to initiation of this evaluation.      ADMITTING DIAGNOSIS: Pneumonia [J18.9]  Pneumonia [J18.9]     ACTIVE PROBLEM LIST:   Patient Active Problem List   Diagnosis    Vascular dementia without behavioral disturbance    Neuropathy    History of stroke    Peripheral polyneuropathy    Pneumonia    [facility]BO [facility] obstruction[facility] ([facility]    Lactic acidosis

## 2019-06-13 NOTE — PROGRESS NOTES
And    sodium chloride (PF)  10 mL Intravenous BID    ampicillin-sulbactam  3 g Intravenous Q6H    lidocaine   Topical Once    sodium chloride flush  10 mL Intravenous 2 times per day    enoxaparin  40 mg Subcutaneous Daily    ipratropium-albuterol  1 ampule Inhalation Q4H WA       Physical Exam:  General Appearance: appears comfortable in no acute distress. HEENT: Normocephalic atraumatic without obvious abnormality   Neck: Lips, mucosa, and tongue normal.  Supple, symmetrical, trachea midline, no adenopathy;thyroid:  no enlargement/tenderness/nodules or JVD. Lung: few crackles at the bases otherwise clear to auscultation. . Symmetrical expansion. Heart: RRR, normal S1, S2. No MRG  Abdomen: Soft, NT, ND. BS present x 4 quadrants. No bruit or organomegaly. Extremities: Pedal pulses 2+ symmetric b/l. Extremities normal, no cyanosis, clubbing, or edema. Musculokeletal: No joint swelling, no muscle tenderness. ROM normal in all joints of extremities. Neurologic: Mental status: Alert and Oriented X3 . Pertinent/ New Labs and Imaging Studies     Imaging Personally Reviewed:      ECHO      Labs:  Lab Results   Component Value Date    WBC 7.0 06/12/2019    HGB 13.9 06/12/2019    HCT 41.5 06/12/2019    MCV 94.3 06/12/2019    MCH 31.6 06/12/2019    MCHC 33.5 06/12/2019    RDW 12.4 06/12/2019     06/12/2019    MPV 10.1 06/12/2019     Lab Results   Component Value Date     06/13/2019    K 4.3 06/13/2019    K 4.3 06/11/2019     06/13/2019    CO2 23 06/13/2019    BUN 22 06/13/2019    CREATININE 1.3 06/13/2019    LABALBU 4.2 06/11/2019    CALCIUM 8.3 06/13/2019    GFRAA >60 06/13/2019    LABGLOM 53 06/13/2019     No results found for: PROTIME, INR  Recent Labs     06/11/19  1446   PROBNP 170     Recent Labs     06/12/19  0330   PROCAL 6.95*     This SmartLink has not been configured with any valid records.        Micro:  resp panel negative  [place], strep urine antigen are neagtive

## 2019-06-13 NOTE — PROGRESS NOTES
Occupational Therapy  OCCUPATIONAL THERAPY INITIAL EVALUATION      Date:2019  Patient Name: Ruth Jiménez  MRN: [id number]  : 1940  Room: [address] [place] [address]    Evaluating OT: Thi Macias OTR/L [id number]    AM-PAC Daily Activity Raw Score:     Recommended Adaptive Equipment: TBD    Diagnosis: PNA. Pt presents to ED with abdominal pain    Pertinent Medical History: CVA, dementia   Precautions:  Falls, O2     Home Living: Pt lives with his wife in a 2 story home with B/B 2nd floor, bath available on 1st floor with 3 steps HR on entry. Bathroom setup: tub/shower combo     Prior Level of Function: Mod I with ADLs, Mod I with IADLs; completed functional mobility no AD, has cane. No home O2. Pain Level: no reported pain    Cognition: A&O: . Pt is alert and oriented. Mild confusion throughout. Changes topics quickly with redirection cues back to task for follow through of safety   Problem solving:  fair   Judgement/safety:  fair     Functional Assessment:   Initial Eval Status  Date: 19 Treatment session:  Short Term Goals  Treatment frequency: 2-5x/wk PRN x1-3 wks   Feeding Set up     Grooming Set up  Independent   UB Dressing Set up  Independent   LB Elliott D 25 A  Mod I   Toileting Min A  Mod I   Bed Mobility  Supine to sit: Min A     Functional Transfers STS: SBA  Mod I   Functional Mobility SBA with [address]  Short in room distance  Mod I during ADLs   Balance Sitting: fair    Standing: fair at [address]     Activity Tolerance Fair. Resting O2 8L: 90%, with mobility: 86% min recovery in sitting to 92%  standing benny x6-7 min with fair plus balance O2 sat >90% during self care tasks           ADL comments: Pt is limited in completion of self care tasks due to weakness, fatigue, O2 dependence and decreased safety awareness.      Strength ROM Additional Info:    RUE  4/5 WFL good  and FMC/dexterity noted during ADL tasks     LUE 4/5 WFL good  and FMC/dexterity noted during ADL tasks         Hearing: WFL   Vision: WFL   Sensation:  No c/o numbness or tingling   Tone: WFL   Edema: none                             Comments/Treatment: Patient educated on adapted techniques for completion of ADL, safe functional transfers and mobility. Patient required cues for follow through with proper hand/foot placement, pacing, safety, attention, sequencing and technique in bed mobility, functional transfers, functional mobility, LB dressing in preparation for maximum independence in all self care tasks. Eval Complexity: Low    Assessment of current deficits   Functional mobility [x]  ADLs [x] Strength [x]  Cognition []  Functional transfers  [x] IADLs [x] Safety Awareness [x]  Endurance [x]  Fine Motor Coordination [] Balance [x] Vision/perception [] Sensation []   Gross Motor Coordination [] ROM [] Delirium []                  Motor Control []    Plan of Care:   ADL retraining [x]   Equipment needs [x]   Neuromuscular re-education [x] Energy Conservation Techniques [x]  Functional Transfer training [x] Patient and/or Family Education [x]  Functional Mobility training [x]  Environmental Modifications [x]  Cognitive re-training []   Compensatory techniques for ADLs [x]  Splinting Needs []   Positioning to improve overall function [x]   Therapeutic Activity [x]  Therapeutic Exercise  [x]  Visual/Perceptual: []    Delirium prevention/treatment  []   Other:  []    Rehab Potential: Good for established goals     Patient / Family Goal: To get home. Patient and/or family were instructed on functional diagnosis, prognosis/goals and OT plan of care. Pt verbalized fair understanding. Upon arrival, patient supine in bed. At end of session, patient seated in chair with call light and phone within reach, all lines and tubes intact. Pt would benefit from continued skilled OT to increase safety and independence with completion of ADL/IADL tasks for functional independence and quality of life. Bed/chair alarm: ON.     Low Evaluation + 0 timed treatment minutes    Evaluation time includes thorough review of current medical information, gathering information on past medical history/social history and prior level of function, completion of standardized testing/informal observation of tasks, assessment of data, and development of POC/Goals    Josh Chand OTR/L  [id number]

## 2019-06-13 NOTE — PROGRESS NOTES
Chief Complaint:  Abdominal pain  Subjective: The patient is alert. Feeling much better. No problems overnight. Denies chest pain. Intermittent SOB . Denies abdominal pain. Tolerating diet. No nausea or vomiting. Passing flatus. Objective:    Vitals:    19 0737   BP: (!) 120/58   Pulse: 91   Resp: 18   Temp: 100.5 °F (38.1 °C)   SpO2:      A&O x's 3, NC O2-high flow  Heart:  RRR, no murmurs, gallops, or rubs. Lungs:  CTA bilaterally, no wheeze, rales or rhonchi  Abd: bowel sounds present, nontender, nondistended, no masses  Extrem:  No clubbing, cyanosis, or edema  Tele: NSR    Lab Results   Component Value Date     2019    K 4.3 2019    K 4.3 2019     2019    CO2 23 2019    BUN 22 2019    CREATININE 1.3 2019    CALCIUM 8.3 2019      Lab Results   Component Value Date    WBC 7.0 2019    RBC 4.40 2019    HGB 13.9 2019    HCT 41.5 2019    MCV 94.3 2019    MCH 31.6 2019    MCHC 33.5 2019    RDW 12.4 2019     2019    MPV 10.1 2019     PT/INR:  No results found for: PROTIME, INR  No results for input(s): POCGLU in the last 72 hours. Recent Labs     19  1446 19  0330 19  0305    140 140   K 4.3 4.4 4.3    108* 106   CO2 23 21* 23   BUN 21 24* 22   LABALBU 4.2  --   --    CREATININE 1.4* 1.3* 1.3*   CALCIUM 8.8 8.2* 8.3*   GFRAA 59 >60 >60   LABGLOM 49 53 53   GLUCOSE 189* 137* 108*       Ct Abdomen Pelvis Wo Contrast Additional Contrast? Oral    Result Date: 2019  Patient MRN:  [id number] : 1940 Age: 77 years Gender: Male Order Date:  2019 7:30 AM EXAM: CT ABDOMEN PELVIS WO CONTRAST number of images 384 Contrast. 50 mL of Omnipaque 240 orally Technique: Low-dose CT  acquisition technique included one of following options; 1 . Automated exposure control, 2. Adjustment of MA and or KV according to patient's size or 3.  Use of iterative reconstruction. INDICATION:  eval pSBO  COMPARISON: 2019 FINDINGS: The lung bases demonstrate persistent  patchy bibasilar infiltrates and pleural effusions. There is mild cardiomegaly with coronary artery calcification and calcified pleural plaques bilaterally. The liver is of normal architecture. Contrast is identified in the gallbladder. The spleen, pancreas, the adrenals and the kidneys are normal. There is mild focal aneurysm of the abdominal aorta measuring 2.4 x 3.2 cm. There is severe degenerative changes in the lumbar spine. There is persistent moderately dilated proximal small bowel loops measuring up to 3.3 cm with a mild improvement in the gastric distention. The distal small bowel loops and colon are partially collapsed. Nonspecific lymph nodes are identified in the mesentery probably inflammatory. Pelvis. Bladder is collapsed with a Ralph catheter. Radiation seeds are identified in the prostate gland. There is diverticulosis of colon without diverticulitis. There is constipation. The appendix is noted. Persistent small bowel dilatation with  moderately dilated proximal small bowel loops and collapsed distal small bowel loops and colon. There is mild improvement in gastric distention. Persistent infiltrates and pleural effusion in lung bases likely CHF or pneumonia. Mild abdominal aortic aneurysm. Xr Acute Abd Series Chest 1 Vw    Result Date: 2019  Patient MRN: [id number] : 1940 Age:  77 years Gender: Male Order Date: 2019 2:45 PM Exam: XR ACUTE ABD SERIES CHEST 1 VW Number of Images: 5 views Indication:   pain, r/o free air pain, r/o free air Comparison: None. Findings: The lungs demonstrate ill-defined pleural calcification more pronounced on the left as compared to the right. This is most pronounced in the left lung base also in the left lateral thorax. . There is no evidence of pulmonary infiltrate or pleural effusion. The pulmonary vascularity is unremarkable.   The cardiac, hilar and mediastinal silhouettes are satisfactory. The bony thorax demonstrates no gross abnormality. Abdomen demonstrated the gas pattern to be nonspecific. There is no evidence of any free air there is osteoarthritic changes of the lumbar spine with mild levoscoliosis. Patient is status post brachytherapy with multiple seeds seen in the prostate. NO ACUTE CARDIOPULMONARY PROCESS No evidence of any free air Nonspecific gas pattern     Ct Abdomen Pelvis W Iv Contrast Additional Contrast? None    Result Date: 6/11/2019  This examination and all examinations utilizing ionizing radiation at this facility are done so according to the ALARA (as low as reasonably achievable) principal for radiation dose reduction. Axial, sagittal, and coronal computed tomography of the abdomen and pelvis was performed following intravenous administration of 110 mL of Isovue 3 7. Consolidation within the bilateral lung bases may reflect atelectasis although pneumonic infectious infiltrate should be excluded. There are calcified pleural plaques bilaterally. The heart is not enlarged. There is fluid throughout the lower esophagus into the stomach. In the abdomen, 12 mm low-density lesion within the central portion of the liver is probably a benign cyst or hemangioma. The liver is otherwise unremarkable gallbladder and hepatobiliary tree or mild atrophy of the otherwise normal-appearing pancreas. Spleen and is a few small splenules are normal in appearance. Adrenal glands show no evidence of thickening or nodule. The kidneys are negative for evidence of solid mass or hydronephrosis. Within the right lower quadrant of the abdomen, there is no evidence for appendicitis. Loops of bowel within the left mid abdomen exceed 3 cm in cross-section. No evidence of free air or free fluid. Transition to normal caliber is within the mid abdomen. Within the pelvis, urinary bladder is unremarkable.  There are radiation beads within the airspace consolidation. Within the upper abdomen, there is no evidence for acute or worrisome process. Skeletal structures are negative for evidence of aggressive process or acute fracture. Consolidation within the bilateral lower lobes posteriorly could reflect atelectasis although infectious pneumonic infiltrate should be considered. Fluid-filled esophagus throughout its course. No evidence for pulmonary embolism or aortic dissection. Xr Abdomen For Ng/og/ne Tube Placement    Result Date: 2019  Patient MRN: [id number] : 1940 Age:  77 years Gender: Male Order Date: 2019 7:00 PM Exam: XR ABDOMEN FOR NG/OG/NE TUBE PLACEMENT Number of Images: 2 views Indication:   Confirmation of course of NG/OG/NE tube and location of tip of tube Confirmation of course of NG/OG/NE tube and location of tip of tube Portable?-> Yes Comparison: None. Findings: Lower chest upper abdomen demonstrate nasogastric tube is in satisfactory position. There is barium seen in the transverse colon from recent barium study. The gas pattern is nonspecific. The nasogastric tube appears to be in satisfactory position with the tip in the stomach. Scheduled Meds:   pantoprazole  40 mg Intravenous BID    And    sodium chloride (PF)  10 mL Intravenous BID    ampicillin-sulbactam  3 g Intravenous Q6H    lidocaine   Topical Once    sodium chloride flush  10 mL Intravenous 2 times per day    enoxaparin  40 mg Subcutaneous Daily    ipratropium-albuterol  1 ampule Inhalation Q4H WA     Continuous Infusions:   sodium chloride 100 mL/hr at 19 0301     PRN Meds:.sodium chloride flush, magnesium hydroxide, ondansetron    I/O last 3 completed shifts: In: 36 [I.V.:3376; NG/GT:90]  Out: 3550 [Urine:3475; Emesis/NG output:75]  No intake/output data recorded.     Intake/Output Summary (Last 24 hours) at 2019 0747  Last data filed at 2019 0651  Gross per 24 hour   Intake 3466 ml   Output 3550 ml   Net -84 ml Assessment:    Active Hospital Problems    Diagnosis    Pneumonia [J18.9]    SBO (small bowel obstruction) ([facility] [address] [L16.662]    Lactic acidosis [E87.2]    Vascular dementia without behavioral disturbance [F01.50]       Plan:  Surgical consult with f/u pending             Pulmonary consult reviewed with ATB changes             Lactic acid nl             SBO released on repeat xray             Lab reviewed--obinna brumfield, RFT stable           Ligia Huitron DO  7:47 AM  6/13/2019

## 2019-06-13 NOTE — PROGRESS NOTES
GENERAL SURGERY  DAILY PROGRESS NOTE  6/13/2019    Chief Complaint   Patient presents with    Abdominal Pain       Subjective:  Feels well. Continued hypoxia overnight and back on NRB. He states that he continues to have BMs and pass flatus. Objective:  BP (!) 120/58   Pulse 91   Temp 100.5 °F (38.1 °C) (Axillary)   Resp 18   Ht 5' 7\" (1.702 m)   Wt 172 lb 12.8 oz (78.4 kg)   SpO2 93%   BMI 27.06 kg/m²     GENERAL:  Laying in bed, awake, alert, cooperative, no apparent distress  LUNGS:  No increased work of breathing  CARDIOVASCULAR:  RR  ABDOMEN:  Soft, non-tender, non-distended, NGT with clear secretions  EXTREMITIES: No edema or swelling  SKIN: Warm and dry    Assessment/Plan:  77 y.o. male admitted with pSBO resolved, and hypoxia ?aspiration    Will leave NGT in place until swallow study completed.    Ok for meds via NGT  Bowel regimen  Will likely need EGD prior to dc due to ongoing epigastric pain and coffee ground emesis on admission   PPI BID    Electronically signed by Sohail Holcomb DO on 6/13/2019 at 12:29 PM

## 2019-06-13 NOTE — PROGRESS NOTES
Physical Therapy    Facility/Department: [address] INTERMEDIATE 1  Initial Assessment    NAME: Sushma Anglin  : 1940  MRN: [id number]    Date of Service: 2019       REQUIRES PT FOLLOW UP: Yes       Patient Diagnosis(es): The primary encounter diagnosis was Small bowel obstruction ([facility] [address] Diagnoses of Acute respiratory failure with hypoxia ([facility] [address] Pneumonia due to organism, and Lactic acidosis were also pertinent to this visit. has a past medical history of CVA (cerebral vascular accident) ([facility] [address] Heart murmur, Osteoarthritis, and Prostate cancer ([facility] [address] has a past surgical history that includes Prostate surgery; hernia repair; and Cataract removal.    Evaluating Therapist: Tong Jones PT        Room #:  614   DIAGNOSIS:  PNA   PRECAUTIONS:  Falls, O2@ 8 L via high flow cannula, continuous pulse ox     Social:  Pt lives with wife  in a 2  floor plan   steps and 1 rails to enter. Prior to admission pt walked with no AD      Initial Evaluation  Date:  19 Treatment      Short Term/ Long Term   Goals   Was pt agreeable to Eval/treatment? yes      Does pt have pain? Bed Mobility  Rolling:  Min assist   Supine to sit: min assist   Sit to supine:  NT   Scooting:  Min assist    S/ I    Transfers Sit to stand:  SBA/CGA   Stand to sit:  SBA/CGA   Stand pivot:  SBA/CGA    S/I    Ambulation     40  feet with  ww  with  CGA    150  feet with  ww or AAD  with  S/I        Stair negotiation: ascended and descended NT    4-12  steps with  1 rail with S/SBA     LE ROM  WFL      LE strength  4/5      AM- PAC RAW score   17/ 24            Pt is alert and Oriented x  3      Balance:  SBA/CGA   Endurance: decreased   Chair alarm: Yes      ASSESSMENT  Pt displays functional ability as noted in the objective portion of this evaluation.       Comments/Treatment:  Pulse ox on  O2 after gait 86% recovered to 92%        Examination of body systems Decreased   Functional mobility x   ROM    Strength x   Safety Awareness    Cognition    Endurance x   Sensation    Balance x   Vision/Visual Deficits    Coordination        Patient education  Pt educated on fall risk, LE exercises, proper breathing technique     Patient response to education:   Pt verbalized understanding Pt demonstrated skill Pt requires further education in this area   x  with cues   x     Rehab potential is Good for reaching above PT goals. Pts/ family goals   1. Increase strength     Patient and or family understand(s) diagnosis, prognosis, and plan of care. -  Yes     PLAN  PT care will be provided in accordance with the objectives noted above. Whenever appropriate, clear delegation orders will be provided for nursing staff. Exercises and functional mobility practice will be used as well as appropriate assistive devices or modalities to obtain goals. Patient and family education will also be administered as needed. Frequency of treatments will be 2-3 x/week x  5 days.     Time in:  0950   Time out:  [address] [place] number:  PT [id number]

## 2019-06-14 ENCOUNTER — ANESTHESIA EVENT (OUTPATIENT)
Dept: OPERATING ROOM | Age: 79
DRG: 871 | End: 2019-06-14
Payer: MEDICARE

## 2019-06-14 PROBLEM — N18.30 STAGE 3 CHRONIC KIDNEY DISEASE (HCC): Status: ACTIVE | Noted: 2019-06-14

## 2019-06-14 PROBLEM — J96.01 ACUTE RESPIRATORY FAILURE WITH HYPOXIA (HCC): Status: ACTIVE | Noted: 2019-06-14

## 2019-06-14 PROBLEM — I71.40 ABDOMINAL AORTIC ANEURYSM (AAA) 3.0 CM TO 5.5 CM IN DIAMETER IN MALE: Status: ACTIVE | Noted: 2019-06-14

## 2019-06-14 PROBLEM — J69.0 ASPIRATION PNEUMONIA (HCC): Status: ACTIVE | Noted: 2019-06-14

## 2019-06-14 PROBLEM — A41.9 SEPSIS (HCC): Status: ACTIVE | Noted: 2019-06-14

## 2019-06-14 LAB
ANION GAP SERPL CALCULATED.3IONS-SCNC: 11 MMOL/L (ref 7–16)
BASOPHILS ABSOLUTE: 0.02 E9/L (ref 0–0.2)
BASOPHILS RELATIVE PERCENT: 0.3 % (ref 0–2)
BUN BLDV-MCNC: 17 MG/DL (ref 8–23)
CALCIUM SERPL-MCNC: 8.3 MG/DL (ref 8.6–10.2)
CHLORIDE BLD-SCNC: 103 MMOL/L (ref 98–107)
CO2: 24 MMOL/L (ref 22–29)
CREAT SERPL-MCNC: 1 MG/DL (ref 0.7–1.2)
EOSINOPHILS ABSOLUTE: 0.29 E9/L (ref 0.05–0.5)
EOSINOPHILS RELATIVE PERCENT: 3.7 % (ref 0–6)
GFR AFRICAN AMERICAN: >60
GFR NON-AFRICAN AMERICAN: >60 ML/MIN/1.73
GLUCOSE BLD-MCNC: 112 MG/DL (ref 74–99)
HCT VFR BLD CALC: 39.5 % (ref 37–54)
HEMOGLOBIN: 13.7 G/DL (ref 12.5–16.5)
IMMATURE GRANULOCYTES #: 0.05 E9/L
IMMATURE GRANULOCYTES %: 0.6 % (ref 0–5)
LYMPHOCYTES ABSOLUTE: 1.06 E9/L (ref 1.5–4)
LYMPHOCYTES RELATIVE PERCENT: 13.4 % (ref 20–42)
MCH RBC QN AUTO: 33 PG (ref 26–35)
MCHC RBC AUTO-ENTMCNC: 34.7 % (ref 32–34.5)
MCV RBC AUTO: 95.2 FL (ref 80–99.9)
MONOCYTES ABSOLUTE: 0.82 E9/L (ref 0.1–0.95)
MONOCYTES RELATIVE PERCENT: 10.4 % (ref 2–12)
NEUTROPHILS ABSOLUTE: 5.68 E9/L (ref 1.8–7.3)
NEUTROPHILS RELATIVE PERCENT: 71.6 % (ref 43–80)
PDW BLD-RTO: 11.9 FL (ref 11.5–15)
PLATELET # BLD: 163 E9/L (ref 130–450)
PMV BLD AUTO: 10.1 FL (ref 7–12)
POTASSIUM SERPL-SCNC: 3.9 MMOL/L (ref 3.5–5)
RBC # BLD: 4.15 E12/L (ref 3.8–5.8)
SODIUM BLD-SCNC: 138 MMOL/L (ref 132–146)
URINE CULTURE, ROUTINE: NORMAL
WBC # BLD: 7.9 E9/L (ref 4.5–11.5)

## 2019-06-14 PROCEDURE — 6360000002 HC RX W HCPCS: Performed by: INTERNAL MEDICINE

## 2019-06-14 PROCEDURE — 6370000000 HC RX 637 (ALT 250 FOR IP): Performed by: INTERNAL MEDICINE

## 2019-06-14 PROCEDURE — 2580000003 HC RX 258: Performed by: STUDENT IN AN ORGANIZED HEALTH CARE EDUCATION/TRAINING PROGRAM

## 2019-06-14 PROCEDURE — 80048 BASIC METABOLIC PNL TOTAL CA: CPT

## 2019-06-14 PROCEDURE — 94640 AIRWAY INHALATION TREATMENT: CPT

## 2019-06-14 PROCEDURE — C9113 INJ PANTOPRAZOLE SODIUM, VIA: HCPCS | Performed by: STUDENT IN AN ORGANIZED HEALTH CARE EDUCATION/TRAINING PROGRAM

## 2019-06-14 PROCEDURE — 85025 COMPLETE CBC W/AUTO DIFF WBC: CPT

## 2019-06-14 PROCEDURE — 6360000002 HC RX W HCPCS: Performed by: STUDENT IN AN ORGANIZED HEALTH CARE EDUCATION/TRAINING PROGRAM

## 2019-06-14 PROCEDURE — 97530 THERAPEUTIC ACTIVITIES: CPT

## 2019-06-14 PROCEDURE — 2580000003 HC RX 258: Performed by: INTERNAL MEDICINE

## 2019-06-14 PROCEDURE — 92526 ORAL FUNCTION THERAPY: CPT | Performed by: SPEECH-LANGUAGE PATHOLOGIST

## 2019-06-14 PROCEDURE — 36415 COLL VENOUS BLD VENIPUNCTURE: CPT

## 2019-06-14 PROCEDURE — 2060000000 HC ICU INTERMEDIATE R&B

## 2019-06-14 RX ORDER — DIAZEPAM 5 MG/1
5 TABLET ORAL DAILY
Status: DISCONTINUED | OUTPATIENT
Start: 2019-06-15 | End: 2019-06-16 | Stop reason: HOSPADM

## 2019-06-14 RX ORDER — DONEPEZIL HYDROCHLORIDE 5 MG/1
5 TABLET, FILM COATED ORAL 2 TIMES DAILY
Status: DISCONTINUED | OUTPATIENT
Start: 2019-06-14 | End: 2019-06-16 | Stop reason: HOSPADM

## 2019-06-14 RX ORDER — MEMANTINE HYDROCHLORIDE 10 MG/1
10 TABLET ORAL 2 TIMES DAILY
Status: DISCONTINUED | OUTPATIENT
Start: 2019-06-14 | End: 2019-06-16 | Stop reason: HOSPADM

## 2019-06-14 RX ORDER — BISACODYL 10 MG
10 SUPPOSITORY, RECTAL RECTAL DAILY
Status: DISCONTINUED | OUTPATIENT
Start: 2019-06-14 | End: 2019-06-16 | Stop reason: HOSPADM

## 2019-06-14 RX ADMIN — AMPICILLIN SODIUM AND SULBACTAM SODIUM 3 G: 2; 1 INJECTION, POWDER, FOR SOLUTION INTRAMUSCULAR; INTRAVENOUS at 09:14

## 2019-06-14 RX ADMIN — SODIUM CHLORIDE: 9 INJECTION, SOLUTION INTRAVENOUS at 04:23

## 2019-06-14 RX ADMIN — IPRATROPIUM BROMIDE AND ALBUTEROL SULFATE 1 AMPULE: .5; 3 SOLUTION RESPIRATORY (INHALATION) at 19:58

## 2019-06-14 RX ADMIN — DONEPEZIL HYDROCHLORIDE 5 MG: 5 TABLET, FILM COATED ORAL at 22:30

## 2019-06-14 RX ADMIN — Medication 10 ML: at 22:30

## 2019-06-14 RX ADMIN — AMPICILLIN SODIUM AND SULBACTAM SODIUM 3 G: 2; 1 INJECTION, POWDER, FOR SOLUTION INTRAMUSCULAR; INTRAVENOUS at 02:29

## 2019-06-14 RX ADMIN — AMPICILLIN SODIUM AND SULBACTAM SODIUM 3 G: 2; 1 INJECTION, POWDER, FOR SOLUTION INTRAMUSCULAR; INTRAVENOUS at 22:31

## 2019-06-14 RX ADMIN — IPRATROPIUM BROMIDE AND ALBUTEROL SULFATE 1 AMPULE: .5; 3 SOLUTION RESPIRATORY (INHALATION) at 12:04

## 2019-06-14 RX ADMIN — IPRATROPIUM BROMIDE AND ALBUTEROL SULFATE 1 AMPULE: .5; 3 SOLUTION RESPIRATORY (INHALATION) at 08:43

## 2019-06-14 RX ADMIN — Medication 10 ML: at 09:14

## 2019-06-14 RX ADMIN — ENOXAPARIN SODIUM 40 MG: 40 INJECTION SUBCUTANEOUS at 22:31

## 2019-06-14 RX ADMIN — MEMANTINE HYDROCHLORIDE 10 MG: 10 TABLET, FILM COATED ORAL at 22:30

## 2019-06-14 RX ADMIN — PANTOPRAZOLE SODIUM 40 MG: 40 INJECTION, POWDER, FOR SOLUTION INTRAVENOUS at 22:31

## 2019-06-14 RX ADMIN — AMPICILLIN SODIUM AND SULBACTAM SODIUM 3 G: 2; 1 INJECTION, POWDER, FOR SOLUTION INTRAMUSCULAR; INTRAVENOUS at 14:47

## 2019-06-14 RX ADMIN — IPRATROPIUM BROMIDE AND ALBUTEROL SULFATE 1 AMPULE: .5; 3 SOLUTION RESPIRATORY (INHALATION) at 16:12

## 2019-06-14 RX ADMIN — PANTOPRAZOLE SODIUM 40 MG: 40 INJECTION, POWDER, FOR SOLUTION INTRAVENOUS at 09:14

## 2019-06-14 ASSESSMENT — PAIN SCALES - GENERAL
PAINLEVEL_OUTOF10: 0

## 2019-06-14 NOTE — PLAN OF CARE
Problem: Falls - Risk of:  Goal: Will remain free from falls  Description  Will remain free from falls  Outcome: Met This Shift  Goal: Absence of physical injury  Description  Absence of physical injury  Outcome: Met This Shift     Problem: Fluid and Electrolyte Imbalance  Goal: Fluid and electrolyte balance are achieved/maintained  Outcome: Met This Shift     Problem: Pain:  Goal: Pain level will decrease  Description  Pain level will decrease     Outcome: Met This Shift     Problem:  Bowel Function - Altered:  Goal: Bowel function will improve to within specified parameters  Description  Bowel function will improve to within specified parameters     Outcome: Met This Shift     Problem: Gas Exchange - Impaired:  Goal: Able to breathe comfortably  Description  Able to breathe comfortably     Outcome: Met This Shift     Problem: HH BREATHING-PNEUMONIA  Goal: Able to breathe comfortably  Description  Able to breathe comfortably     Outcome: Met This Shift

## 2019-06-14 NOTE — PROGRESS NOTES
Chief Complaint:  Abdominal pain  Subjective: The patient is alert. Feeling much better. No problems overnight. Denies chest pain. Intermittent SOB . Denies abdominal pain. Tolerating diet. No nausea or vomiting. Passing flatus. 6/14/2019-patient sitting in bedside chair; nasal cannula oxygen in place. States he feels much better; he did have bowel movement. No further hematemesis. Seen earlier today by surgery now scheduled for EGD in the a.m. due to the hematemesis. Pulmonary wants to have him wean from oxygen before discharge. Social service note from today appreciated. Patient states he is never had any history of heart murmur; he has had strokes. Electronic record states murmur present; patient unaware of the above. He does not recall ever having 2D echo performed here or [place]. Nasal cannula 5 L 96% saturation. Temperature 98.1 respiration 16 pulse 93 blood pressure 90/56. WBC 7.9 hemoglobin 13.7 platelets 498. Sodium 138 potassium 3.9 chloride 103 CO2 24 BUN 17 creatinine 1.7 glucose 112 calcium 8.3. Objective:    Vitals:    06/14/19 0906   BP: (!) 90/56   Pulse: 93   Resp: 16   Temp: 98.1 °F (36.7 °C)   SpO2: 96%     A&O x's 3, NC O2-5 L/min  Heart:  RRR, no, gallops, or rubs.   Grade 1/6 to 2/6 systolic murmur second right intercostal space  Lungs:  CTA bilaterally, no wheeze, rales or rhonchi  Abd: bowel sounds present, nontender, nondistended, no masses  Extrem:  No clubbing, cyanosis, or edema  Tele: NSR    Lab Results   Component Value Date     06/14/2019    K 3.9 06/14/2019    K 4.3 06/11/2019     06/14/2019    CO2 24 06/14/2019    BUN 17 06/14/2019    CREATININE 1.0 06/14/2019    CALCIUM 8.3 06/14/2019      Lab Results   Component Value Date    WBC 7.9 06/14/2019    RBC 4.15 06/14/2019    HGB 13.7 06/14/2019    HCT 39.5 06/14/2019    MCV 95.2 06/14/2019    MCH 33.0 06/14/2019    MCHC 34.7 06/14/2019    RDW 11.9 06/14/2019     06/14/2019    MPV 10.1 2019     PT/INR:  No results found for: PROTIME, INR  No results for input(s): POCGLU in the last 72 hours. Recent Labs     19  1446 19  0330 19  0305 19  0320    140 140 138   K 4.3 4.4 4.3 3.9    108* 106 103   CO2 23 21* 23 24   BUN 21 24* 22 17   LABALBU 4.2  --   --   --    CREATININE 1.4* 1.3* 1.3* 1.0   CALCIUM 8.8 8.2* 8.3* 8.3*   GFRAA 59 >60 >60 >60   LABGLOM 49 53 53 >60   GLUCOSE 189* 137* 108* 112*       Ct Abdomen Pelvis Wo Contrast Additional Contrast? Oral    Result Date: 2019  Patient MRN:  [id number] : 1940 Age: 77 years Gender: Male Order Date:  2019 7:30 AM EXAM: CT ABDOMEN PELVIS WO CONTRAST number of images 384 Contrast. 50 mL of Omnipaque 240 orally Technique: Low-dose CT  acquisition technique included one of following options; 1 . Automated exposure control, 2. Adjustment of MA and or KV according to patient's size or 3. Use of iterative reconstruction. INDICATION:  eval pSBO  COMPARISON: 2019 FINDINGS: The lung bases demonstrate persistent  patchy bibasilar infiltrates and pleural effusions. There is mild cardiomegaly with coronary artery calcification and calcified pleural plaques bilaterally. The liver is of normal architecture. Contrast is identified in the gallbladder. The spleen, pancreas, the adrenals and the kidneys are normal. There is mild focal aneurysm of the abdominal aorta measuring 2.4 x 3.2 cm. There is severe degenerative changes in the lumbar spine. There is persistent moderately dilated proximal small bowel loops measuring up to 3.3 cm with a mild improvement in the gastric distention. The distal small bowel loops and colon are partially collapsed. Nonspecific lymph nodes are identified in the mesentery probably inflammatory. Pelvis. Bladder is collapsed with a Ralph catheter. Radiation seeds are identified in the prostate gland. There is diverticulosis of colon without diverticulitis.  There is be excluded. There are calcified pleural plaques bilaterally. The heart is not enlarged. There is fluid throughout the lower esophagus into the stomach. In the abdomen, 12 mm low-density lesion within the central portion of the liver is probably a benign cyst or hemangioma. The liver is otherwise unremarkable gallbladder and hepatobiliary tree or mild atrophy of the otherwise normal-appearing pancreas. Spleen and is a few small splenules are normal in appearance. Adrenal glands show no evidence of thickening or nodule. The kidneys are negative for evidence of solid mass or hydronephrosis. Within the right lower quadrant of the abdomen, there is no evidence for appendicitis. Loops of bowel within the left mid abdomen exceed 3 cm in cross-section. No evidence of free air or free fluid. Transition to normal caliber is within the mid abdomen. Within the pelvis, urinary bladder is unremarkable. There are radiation beads within the prostate gland. No free fluid or adenopathy in the pelvis Degenerative spondylosis and facet arthropathy are identified within the spine. Osteophytosis and eburnation of sacroiliac joints and hips. The skeletal structures are negative for evidence of aggressive process or acute fracture. IMPRESSIONS: Small bowel obstruction versus ileus. Consolidation within the bilateral lung bases may reflect infectious pneumonia or atelectasis. Cta Pulmonary W Contrast    Result Date: 6/11/2019  Clinical indications: Shortness of breath. Right upper quadrant pain. Hypoxia. COMPARISON: Initial radiographs earlier today. Exposure control: This examination and all examinations utilizing ionizing radiation at this facility done so according to the ALARA (as low as reasonably achievable) principal for radiation dose reduction. Technique: Axial, sagittal, and coronal computed tomography of the chest was performed following intravenous administration of 90 mL of Isovue-370. 3-D postprocessing.  Three-dimensional reconstructions of the arterial tree. MIP imaging. FINDINGS: Evaluation of the pulmonary arterial tree reveals no intraluminal filling defect to suggest embolic phenomenon. There is no evidence of aortic dissection. Within the thoracic inlet, the thyroid gland is unremarkable. The major airways are patent. There is no mediastinal or hilar mass or lymphadenopathy. The heart is not enlarged. There is no evidence of pericardial fluid. Esophagus is fluid-filled throughout its course within the chest and into the stomach. Evaluation of the lung parenchyma reveals consolidation within the bilateral lower lobes posteriorly which could reflect atelectasis although infectious pneumonic infiltrate should be considered. There are calcified pleural plaques along the bilateral posterior costophrenic angles and the anterior upper lobes bilaterally. There is scarring of the apices. The lungs are otherwise negative for dominant mass or airspace consolidation. Within the upper abdomen, there is no evidence for acute or worrisome process. Skeletal structures are negative for evidence of aggressive process or acute fracture. Consolidation within the bilateral lower lobes posteriorly could reflect atelectasis although infectious pneumonic infiltrate should be considered. Fluid-filled esophagus throughout its course. No evidence for pulmonary embolism or aortic dissection. Xr Abdomen For Ng/og/ne Tube Placement    Result Date: 2019  Patient MRN: [id number] : 1940 Age:  77 years Gender: Male Order Date: 2019 7:00 PM Exam: XR ABDOMEN FOR NG/OG/NE TUBE PLACEMENT Number of Images: 2 views Indication:   Confirmation of course of NG/OG/NE tube and location of tip of tube Confirmation of course of NG/OG/NE tube and location of tip of tube Portable?-> Yes Comparison: None. Findings: Lower chest upper abdomen demonstrate nasogastric tube is in satisfactory position.  There is barium seen in the transverse colon from PM  6/14/2019

## 2019-06-14 NOTE — PROGRESS NOTES
SPEECH LANGUAGE PATHOLOGY  DAILY PROGRESS NOTE            SWALLOWING    Pt alert, upright in chair. Reports good toleration of current diet. 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 Encouraged to self complete 3-4 times per day as able. Handout provided. Deep Pharyngeal Neuromuscular Stimulation initiated. Pt able to complete 9 presentations. No gag reflex noted, fair pharyngeal movement. Spontaneous swallow at 100%. Education- Pt educated on MBS results and POC. Pt trained on comp strats for safe swallow and encouraged to use with all intake. Handout provided. Will continue SP intervention as per previously established POC. Recommend continued ST on d/c from this facility. John CULP CCC/SLP [id number]  Speech Pathologist

## 2019-06-14 NOTE — PLAN OF CARE
Problem: Falls - Risk of:  Goal: Will remain free from falls  Description  Will remain free from falls  6/14/2019 1518 by Keiry Jett RN  Outcome: Met This Shift  6/14/2019 0927 by Carole Márquez RN  Outcome: Met This Shift  Goal: Absence of physical injury  Description  Absence of physical injury  6/14/2019 1518 by Keiry Jett RN  Outcome: Met This Shift  6/14/2019 0927 by Carole Márquez RN  Outcome: Met This Shift     Problem: Fluid and Electrolyte Imbalance  Goal: Fluid and electrolyte balance are achieved/maintained  6/14/2019 0927 by Carole Márquez RN  Outcome: Met This Shift     Problem: Pain:  Goal: Pain level will decrease  Description  Pain level will decrease     6/14/2019 0927 by Carole Márquez RN  Outcome: Met This Shift     Problem:  Bowel Function - Altered:  Goal: Bowel function will improve to within specified parameters  Description  Bowel function will improve to within specified parameters     6/14/2019 0927 by Carole Márquez RN  Outcome: Met This Shift     Problem: Gas Exchange - Impaired:  Goal: Able to breathe comfortably  Description  Able to breathe comfortably     6/14/2019 1518 by Keiry Jett RN  Outcome: Met This Shift  6/14/2019 0927 by Carole Márquez RN  Outcome: Met This Shift     Problem: HH BREATHING-PNEUMONIA  Goal: Able to breathe comfortably  Description  Able to breathe comfortably     6/14/2019 0927 by Carole Márquez RN  Outcome: Met This Shift

## 2019-06-14 NOTE — ANESTHESIA PRE PROCEDURE
Department of Anesthesiology  Preprocedure Note       Name:  Marva Bachelor   Age:  77 y.o.  :  1940                                          MRN:  [id number]         Date:  2019      Surgeon: Mireya Velasquez):  Bladimir Stone MD    Procedure: EGD ESOPHAGOGASTRODUODENOSCOPY (N/A )    Medications prior to admission:   Prior to Admission medications    Medication Sig Start Date End Date Taking? Authorizing Provider   Omega-3 Fatty Acids (FISH OIL) 1000 MG CAPS Take 1,000 mg by mouth every morning   Yes Historical Provider, MD   Multiple Vitamins-Minerals (THERAPEUTIC MULTIVITAMIN-MINERALS) tablet Take 1 tablet by mouth every morning   Yes Historical Provider, MD   vitamin E 400 UNIT capsule Take 400 Units by mouth every morning  09  Yes Historical Provider, MD   donepezil (ARICEPT) 10 MG tablet 1/2 tab twice daily  Patient taking differently: Take 5 mg by mouth 2 times daily  3/20/19  Yes LIZETH Galarza CNP   memantine (NAMENDA) 10 MG tablet Take 1 tablet by mouth 2 times daily 10/17/18  Yes LIZETH Galarza CNP   pantoprazole (PROTONIX) 40 MG tablet Take 40 mg by mouth every morning  3/12/18  Yes Historical Provider, MD   atorvastatin (LIPITOR) 40 MG tablet Take 40 mg by mouth every morning    Yes Historical Provider, MD   amLODIPine (NORVASC) 10 MG tablet Take 10 mg by mouth every morning    Yes Historical Provider, MD   vitamin B-12 (CYANOCOBALAMIN) 500 MCG tablet Take 500 mcg by mouth every morning    Yes Historical Provider, MD   aspirin 81 MG chewable tablet Take 1 tablet by mouth daily  Patient taking differently: Take 81 mg by mouth every morning  17  Yes LIZETH Galarza CNP   diazepam (VALIUM) 5 MG tablet Take 5 mg by mouth every morning.     Yes Historical Provider, MD       Current medications:    Current Facility-Administered Medications   Medication Dose Route Frequency Provider Last Rate Last Dose    bisacodyl (DULCOLAX) suppository 10 mg  10 mg Rectal Daily Pallavi Almanza MD        memantine [facility]) tablet 10 mg  10 mg Oral BID Jeanella Prom rTinidadk, DO        donepezil (ARICEPT) tablet 5 mg  5 mg Oral BID Jeanella Prom Mihok, DO        [START ON 6/15/2019] diazepam (VALIUM) tablet 5 mg  5 mg Oral Daily Ryley Tee, DO        barium sulfate 60 % cream 45 g  45 g Oral ONCE PRN Ovidio Irwin MD        barium sulfate 96 % suspension 44 g  44 g Oral ONCE PRN Selam Lyman II, MD        barium sulfate 98 % suspension 35 mL  35 mL Oral ONCE PRN Selam Lyman II, MD        0.9 % sodium chloride infusion   Intravenous Continuous Charles Huitron,  mL/hr at 06/14/19 0423      pantoprazole (PROTONIX) injection 40 mg  40 mg Intravenous BID Adam Charu, DO   40 mg at 06/14/19 2015    And    sodium chloride (PF) 0.9 % injection 10 mL  10 mL Intravenous BID Adam Charu, DO   10 mL at 06/14/19 0914    ampicillin-sulbactam (UNASYN) 3 g ivpb minibag  3 g Intravenous Q6H Robert Brown MD   Stopped at 06/14/19 1517    lidocaine (XYLOCAINE) 2 % jelly   Topical Once Adam Charu, DO        sodium chloride flush 0.9 % injection 10 mL  10 mL Intravenous 2 times per day Charles Huitron DO   10 mL at 06/13/19 2006    sodium chloride flush 0.9 % injection 10 mL  10 mL Intravenous PRN Charles Huitron, DO        magnesium hydroxide (MILK OF MAGNESIA) 400 MG/5ML suspension 30 mL  30 mL Oral Daily PRN Charles Huitron DO        ondansetron (ZOFRAN) injection 4 mg  4 mg Intravenous Q6H PRN Charles Huitron DO        enoxaparin (LOVENOX) injection 40 mg  40 mg Subcutaneous Daily Charles Huitron DO   40 mg at 06/13/19 2005    ipratropium-albuterol (DUONEB) nebulizer solution 1 ampule  1 ampule Inhalation Q4H WA Charles Huitron DO   1 ampule at 06/14/19 1612       Allergies:     Allergies   Allergen Reactions    Oxycodone-Acetaminophen Nausea And Vomiting     Other reaction(s): GI Upset    Vicodin [Hydrocodone-Acetaminophen] Nausea And Vomiting       Problem List:    Patient Active Problem List   Diagnosis Code    Vascular dementia without behavioral disturbance F01.50    Neuropathy G62.9    History of stroke Z86.73    Peripheral polyneuropathy G62.9    Pneumonia J18.9    SBO (small bowel obstruction) ([address] K56.609    Lactic acidosis E87.2    Stage 3 chronic kidney disease ([address] N18.3    Abdominal aortic aneurysm (AAA) 3.0 cm to 5.5 cm in diameter in [facility]) I71.4    Acute respiratory failure with hypoxia ([address] J96.01    Aspiration pneumonia ([address] J69.0    Sepsis ([address] A41.9    Hematemesis K92.0       Past Medical History:        Diagnosis Date    Abdominal aortic aneurysm (AAA) 3.0 cm to 5.5 cm in diameter in [facility]) 2019    Acute respiratory failure with hypoxia ([address] 2019    Aspiration pneumonia ([address] 2019    CVA (cerebral vascular accident) ([address]     Heart murmur     Hematemesis     Osteoarthritis     Prostate cancer ([address] 2007    Sepsis ([address] 2019    Present on admission    Stage 3 chronic kidney disease ([address] 2019       Past Surgical History:        Procedure Laterality Date    CATARACT REMOVAL      HERNIA REPAIR      PROSTATE SURGERY      beacons/radiation       Social History:    Social History     Tobacco Use    Smoking status: Former Smoker     Types: Cigarettes     Start date: 1959     Last attempt to quit: 1987     Years since quittin.1    Smokeless tobacco: Never Used   Substance Use Topics    Alcohol use: Never     Frequency: Never     Comment: 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                                Counseling given: Not Answered      Vital Signs (Current):   Vitals:    19 0500 19 0906 19 1630 19 1730   BP:  (!) 90/56 (!) 110/59    Pulse:  93 81    Resp:  16 16    Temp:  98.1 °F (36.7 °C) 98 °F (36.7 °C)    TempSrc:  Oral Oral    SpO2:  96% 92% 94%   Weight: 172 lb 14.4 oz (78.4 kg)      Height: BP Readings from Last 3 Encounters:   06/14/19 (!) 110/59   03/20/19 (!) 145/70   10/17/18 128/74       NPO Status:                                                                                 BMI:   Wt Readings from Last 3 Encounters:   06/14/19 172 lb 14.4 oz (78.4 kg)   03/20/19 198 lb (89.8 kg)   10/17/18 202 lb (91.6 kg)     Body mass index is 27.08 kg/m². CBC:   Lab Results   Component Value Date    WBC 7.9 06/14/2019    RBC 4.15 06/14/2019    HGB 13.7 06/14/2019    HCT 39.5 06/14/2019    MCV 95.2 06/14/2019    RDW 11.9 06/14/2019     06/14/2019       CMP:   Lab Results   Component Value Date     06/14/2019    K 3.9 06/14/2019    K 4.3 06/11/2019     06/14/2019    CO2 24 06/14/2019    BUN 17 06/14/2019    CREATININE 1.0 06/14/2019    GFRAA >60 06/14/2019    LABGLOM >60 06/14/2019    GLUCOSE 112 06/14/2019    PROT 6.6 06/11/2019    CALCIUM 8.3 06/14/2019    BILITOT 0.9 06/11/2019    ALKPHOS 116 06/11/2019    AST 23 06/11/2019    ALT 15 06/11/2019       POC Tests: No results for input(s): POCGLU, POCNA, POCK, POCCL, POCBUN, POCHEMO, POCHCT in the last 72 hours.     Coags: No results found for: PROTIME, INR, APTT    HCG (If Applicable): No results found for: PREGTESTUR, PREGSERUM, HCG, HCGQUANT     ABGs: No results found for: PHART, PO2ART, HDF1REG, DGE3CZC, BEART, B9KMCNFE     Type & Screen (If Applicable):  No results found for: LABABO, [address]    Anesthesia Evaluation  Patient summary reviewed no history of anesthetic complications:   Airway: Mallampati: II  TM distance: >3 FB   Neck ROM: full   Dental:    (+) lower dentures and upper dentures      Pulmonary:   (+) pneumonia (Aspiration pneumonia ):  decreased breath sounds,                            ROS comment: Former Smoker  Acute respiratory failure with hypoxia   asbestosis   Cardiovascular:    (+) hyperlipidemia        Rhythm: regular  Rate: normal           Beta Blocker:  Not on Beta Blocker         Neuro/Psych:   (+) CVA:, neuromuscular disease (Peripheral polyneuropathy):, psychiatric history (Vascular dementia without behavioral disturbance):            GI/Hepatic/Renal:   (+) PUD, renal disease: CRI,           Endo/Other:    (+) blood dyscrasia (HGB 12.1): anemia, arthritis: OA., .                 Abdominal:           Vascular:   + PVD, aortic or cerebral (Abdominal aortic aneurysm (AAA) 3.0 cm to 5.5 cm in diameter in male ), . Anesthesia Plan      MAC     ASA 4       Induction: intravenous. Anesthetic plan and risks discussed with patient. Queta Mcdonnell MD   6/14/2019      Patient will need to be re-evaluated prior to surgery by DOS anesthesiologist.    Queta Mcdonnell MD           6/14/2019        7:25 PM    Pt seen, examined, chart reviewed, plan discussed.   [facility]  6/15/2019  6:44 AM

## 2019-06-14 NOTE — PROGRESS NOTES
Occupational Therapy  OT BEDSIDE TREATMENT NOTE      Date:2019  Patient Name: Storm Grullon  MRN: [id number]  : 1940  Room: [address] [place] [address]     Evaluating OT: Robby Weeks OTR/L [id number]     AM-PAC Daily Activity Raw Score:      Recommended Adaptive Equipment: TBD     Diagnosis: PNA. Pt presents to ED with abdominal pain     Pertinent Medical History: CVA, dementia   Precautions:  Falls, O2     Home Living: Pt lives with his wife in a 2 story home with B/B 2nd floor, bath available on 1st floor with 3 steps HR on entry. Bathroom setup: tub/shower combo      Prior Level of Function: Mod I with ADLs, Mod I with IADLs; completed functional mobility no AD, has cane. No home O2.     Pain Level: No c/o pain     Cognition: A&O: /. Pt is alert and oriented. Mild confusion throughout. Changes topics quickly with redirection cues back to task for follow through of safety              Problem solving:  fair              Judgement/safety:  fair                Functional Assessment:    Initial Eval Status  Date: 19 Treatment session: 19 Short Term Goals  Treatment frequency: 2-5x/wk PRN x1-3 wks   Feeding Set up       Grooming Set up   Independent   UB Dressing Set up   Independent   LB Dressing Min A Min A to manage B socks  Mod I    Bathing Min A   Mod I   Toileting Min A SBA standing for hygiene due to pt being slightly soiled.  Mod I   Bed Mobility  Supine to sit: Min A       Functional Transfers STS: SBA STS: SBA from chair  Mod I   Functional Mobility SBA with [address]  Short in room distance SBA in room using ww  Mod I during ADLs   Balance Sitting: fair     Standing: fair at [address]  Sitting: Sup    Standing: SBA at ww     Activity Tolerance Fair. Resting O2 8L: 90%, with mobility: 86% min recovery in sitting to 92% Fair requiring rest breaks due to fatigue  standing benny x6-7 min with fair plus balance O2 sat >90% during self care tasks           B UE were [facility] during tasks.     Comments: Upon arrival pt was sitting in chair. At end of session pt was transferred back to chair with alarm on and all lines and tubes intact, call light within reach. Education: AD placement during ADLs and safe transfer training. · Pt has made good progress towards set goals.    · Continue with current plan of care      Total Tx Time: [address] [place]/L [id number]

## 2019-06-14 NOTE — PROGRESS NOTES
Rohini Grace M.D.,[facility]  Terell cMconnell D.O., F.A.C.O.I., Anabel Grace M.D. Jacqueline Mcmullen M.D., Justice García M.D. Daily Pulmonary Progress Note    Patient:  Ermelinda Manley 77 y.o. male MRN: [id number]     Date of Service: 6/14/2019      Synopsis     We are following patient for aspiration pneumonia    \"CC\" : Nausea and vomiting    Code status: Full code      Subjective      Patient was seen and examined. sitting up in the chair, looking much better today, awake and alert and oriented. Son at the bedside. He is currently on 4 L oxygen and saturating 94%. Review of Systems:  Constitutional: Denies fever, weight loss, night sweats, and fatigue  Skin: Denies pigmentation, dark lesions, and rashes   HEENT: Denies hearing loss, tinnitus, ear drainage, epistaxis, sore throat, and hoarseness. Cardiovascular: Denies palpitations, chest pain, and chest pressure. Respiratory: Denies cough, dyspnea at rest, hemoptysis, apnea, and choking.   Gastrointestinal: Denies nausea, vomiting, poor appetite, diarrhea, heartburn or reflux  Genitourinary: Denies dysuria, frequency, urgency or hematuria  Musculoskeletal: Denies myalgias, muscle weakness, and bone pain  Neurological: Denies dizziness, vertigo, headache, and focal weakness  Psychological: Denies anxiety and depression  Endocrine: Denies heat intolerance and cold intolerance  Hematopoietic/Lymphatic: Denies bleeding problems and blood transfusions    24-hour events:      Objective   Vitals: BP (!) 90/56   Pulse 93   Temp 98.1 °F (36.7 °C) (Oral)   Resp 16   Ht 5' 7\" (1.702 m)   Wt 172 lb 14.4 oz (78.4 kg)   SpO2 96%   BMI 27.08 kg/m²     I/O:    Intake/Output Summary (Last 24 hours) at 6/14/2019 1104  Last data filed at 6/14/2019 0906  Gross per 24 hour   Intake 1860 ml   Output 3700 ml   Net -1840 ml                        CURRENT MEDS :  Scheduled Meds:   bisacodyl  10 mg Rectal Daily    pantoprazole  40 mg Intravenous BID    And    sodium chloride (PF)  10 mL Intravenous BID    ampicillin-sulbactam  3 g Intravenous Q6H    lidocaine   Topical Once    sodium chloride flush  10 mL Intravenous 2 times per day    enoxaparin  40 mg Subcutaneous Daily    ipratropium-albuterol  1 ampule Inhalation Q4H WA       Physical Exam:  General Appearance: appears comfortable in no acute distress. HEENT: Normocephalic atraumatic without obvious abnormality   Neck: Lips, mucosa, and tongue normal.  Supple, symmetrical, trachea midline, no adenopathy;thyroid:  no enlargement/tenderness/nodules or JVD. Lung: few crackles at the bases otherwise clear to auscultation. . Symmetrical expansion. Heart: RRR, normal S1, S2. No MRG  Abdomen: Soft, NT, ND. BS present x 4 quadrants. No bruit or organomegaly. Extremities: Pedal pulses 2+ symmetric b/l. Extremities normal, no cyanosis, clubbing, or edema. Musculokeletal: No joint swelling, no muscle tenderness. ROM normal in all joints of extremities. Neurologic: Mental status: Alert and Oriented X3 . Pertinent/ New Labs and Imaging Studies     Imaging Personally Reviewed:      ECHO      Labs:  Lab Results   Component Value Date    WBC 7.0 06/13/2019    HGB 13.3 06/13/2019    HCT 38.5 06/13/2019    MCV 94.8 06/13/2019    MCH 32.8 06/13/2019    MCHC 34.5 06/13/2019    RDW 12.1 06/13/2019     06/13/2019    MPV 9.7 06/13/2019     Lab Results   Component Value Date     06/14/2019    K 3.9 06/14/2019    K 4.3 06/11/2019     06/14/2019    CO2 24 06/14/2019    BUN 17 06/14/2019    CREATININE 1.0 06/14/2019    LABALBU 4.2 06/11/2019    CALCIUM 8.3 06/14/2019    GFRAA >60 06/14/2019    LABGLOM >60 06/14/2019     No results found for: PROTIME, INR  Recent Labs     06/11/19  1446   PROBNP 170     Recent Labs     06/12/19  0330   PROCAL 6.95*     This SmartLink has not been configured with any valid records.        Micro:  resp panel negative  [place], strep urine antigen are neagtive     Assessment:    1. Aspiration pneumonia  2. Acute hypoxic resp failure secondary to #1- improving  3. Sepsis, lactic acidosis- improved      Plan:   1. Continue unasyn (#3/7), will switch to Augmentin upon discharge ,   2. Wean FiO2 for saturations above 94%, I weaned it down  to 2L/min today and he maintain his saturations above 94% hopefully we can wean him off totally within the next 24 hours  3. Courage to continue incentive spirometry, patient generates tidal volumes up to 1200  4. Swallow study reviewed speech therapy recommended  5. Patient for EGD tomorrow  6. LIT has resolved creatinine down to 1.  7. CBC diff today    Answered all questions.   Electronically signed by Hosea Barnhart MD on 6/14/2019 at 11:04 AM

## 2019-06-14 NOTE — HOME CARE
Referral received for home health care. Spoke with wife. Demographics verified. She states they have also utilized [place] services for nursing/home care and at this time unsure if they would like to [address] or [address]. She states she is on her way up to hospital and will discuss with team. Will follow for discharge plans.    Salina Smith LPN/ARTURO

## 2019-06-15 ENCOUNTER — APPOINTMENT (OUTPATIENT)
Dept: GENERAL RADIOLOGY | Age: 79
DRG: 871 | End: 2019-06-15
Payer: MEDICARE

## 2019-06-15 ENCOUNTER — ANESTHESIA (OUTPATIENT)
Dept: OPERATING ROOM | Age: 79
DRG: 871 | End: 2019-06-15
Payer: MEDICARE

## 2019-06-15 VITALS — DIASTOLIC BLOOD PRESSURE: 81 MMHG | SYSTOLIC BLOOD PRESSURE: 167 MMHG | OXYGEN SATURATION: 91 %

## 2019-06-15 LAB
ALBUMIN SERPL-MCNC: 2.7 G/DL (ref 3.5–5.2)
ALP BLD-CCNC: 84 U/L (ref 40–129)
ALT SERPL-CCNC: 27 U/L (ref 0–40)
ANION GAP SERPL CALCULATED.3IONS-SCNC: 9 MMOL/L (ref 7–16)
AST SERPL-CCNC: 32 U/L (ref 0–39)
BASOPHILS ABSOLUTE: 0.02 E9/L (ref 0–0.2)
BASOPHILS RELATIVE PERCENT: 0.3 % (ref 0–2)
BILIRUB SERPL-MCNC: 0.5 MG/DL (ref 0–1.2)
BILIRUBIN DIRECT: <0.2 MG/DL (ref 0–0.3)
BILIRUBIN, INDIRECT: ABNORMAL MG/DL (ref 0–1)
BUN BLDV-MCNC: 19 MG/DL (ref 8–23)
CALCIUM SERPL-MCNC: 7.8 MG/DL (ref 8.6–10.2)
CHLORIDE BLD-SCNC: 105 MMOL/L (ref 98–107)
CO2: 22 MMOL/L (ref 22–29)
CREAT SERPL-MCNC: 1 MG/DL (ref 0.7–1.2)
EOSINOPHILS ABSOLUTE: 0.3 E9/L (ref 0.05–0.5)
EOSINOPHILS RELATIVE PERCENT: 4.2 % (ref 0–6)
FOLATE: 12.5 NG/ML (ref 4.8–24.2)
GFR AFRICAN AMERICAN: >60
GFR NON-AFRICAN AMERICAN: >60 ML/MIN/1.73
GLUCOSE BLD-MCNC: 127 MG/DL (ref 74–99)
HBA1C MFR BLD: 5.6 % (ref 4–5.6)
HCT VFR BLD CALC: 34.7 % (ref 37–54)
HEMOGLOBIN: 12.1 G/DL (ref 12.5–16.5)
IMMATURE GRANULOCYTES #: 0.06 E9/L
IMMATURE GRANULOCYTES %: 0.8 % (ref 0–5)
LYMPHOCYTES ABSOLUTE: 1.44 E9/L (ref 1.5–4)
LYMPHOCYTES RELATIVE PERCENT: 20.2 % (ref 20–42)
MAGNESIUM: 1.8 MG/DL (ref 1.6–2.6)
MCH RBC QN AUTO: 32.8 PG (ref 26–35)
MCHC RBC AUTO-ENTMCNC: 34.9 % (ref 32–34.5)
MCV RBC AUTO: 94 FL (ref 80–99.9)
MONOCYTES ABSOLUTE: 0.64 E9/L (ref 0.1–0.95)
MONOCYTES RELATIVE PERCENT: 9 % (ref 2–12)
NEUTROPHILS ABSOLUTE: 4.68 E9/L (ref 1.8–7.3)
NEUTROPHILS RELATIVE PERCENT: 65.5 % (ref 43–80)
PDW BLD-RTO: 11.8 FL (ref 11.5–15)
PHOSPHORUS: 3.2 MG/DL (ref 2.5–4.5)
PLATELET # BLD: 152 E9/L (ref 130–450)
PMV BLD AUTO: 10.4 FL (ref 7–12)
POTASSIUM SERPL-SCNC: 3.7 MMOL/L (ref 3.5–5)
RBC # BLD: 3.69 E12/L (ref 3.8–5.8)
SODIUM BLD-SCNC: 136 MMOL/L (ref 132–146)
TOTAL PROTEIN: 5.1 G/DL (ref 6.4–8.3)
VITAMIN B-12: 559 PG/ML (ref 211–946)
WBC # BLD: 7.1 E9/L (ref 4.5–11.5)

## 2019-06-15 PROCEDURE — 3600007501: Performed by: SURGERY

## 2019-06-15 PROCEDURE — 94667 MNPJ CHEST WALL 1ST: CPT

## 2019-06-15 PROCEDURE — 84100 ASSAY OF PHOSPHORUS: CPT

## 2019-06-15 PROCEDURE — 6360000002 HC RX W HCPCS: Performed by: INTERNAL MEDICINE

## 2019-06-15 PROCEDURE — 36415 COLL VENOUS BLD VENIPUNCTURE: CPT

## 2019-06-15 PROCEDURE — 80076 HEPATIC FUNCTION PANEL: CPT

## 2019-06-15 PROCEDURE — 83735 ASSAY OF MAGNESIUM: CPT

## 2019-06-15 PROCEDURE — 6370000000 HC RX 637 (ALT 250 FOR IP): Performed by: SURGERY

## 2019-06-15 PROCEDURE — 88305 TISSUE EXAM BY PATHOLOGIST: CPT

## 2019-06-15 PROCEDURE — 2580000003 HC RX 258: Performed by: SURGERY

## 2019-06-15 PROCEDURE — 85025 COMPLETE CBC W/AUTO DIFF WBC: CPT

## 2019-06-15 PROCEDURE — 82746 ASSAY OF FOLIC ACID SERUM: CPT

## 2019-06-15 PROCEDURE — 6360000002 HC RX W HCPCS: Performed by: SURGERY

## 2019-06-15 PROCEDURE — 83036 HEMOGLOBIN GLYCOSYLATED A1C: CPT

## 2019-06-15 PROCEDURE — 94640 AIRWAY INHALATION TREATMENT: CPT

## 2019-06-15 PROCEDURE — 2580000003 HC RX 258: Performed by: INTERNAL MEDICINE

## 2019-06-15 PROCEDURE — 2709999900 HC NON-CHARGEABLE SUPPLY: Performed by: SURGERY

## 2019-06-15 PROCEDURE — 0DB68ZX EXCISION OF STOMACH, VIA NATURAL OR ARTIFICIAL OPENING ENDOSCOPIC, DIAGNOSTIC: ICD-10-PCS | Performed by: SURGERY

## 2019-06-15 PROCEDURE — 6360000002 HC RX W HCPCS: Performed by: NURSE ANESTHETIST, CERTIFIED REGISTERED

## 2019-06-15 PROCEDURE — 7100000010 HC PHASE II RECOVERY - FIRST 15 MIN: Performed by: SURGERY

## 2019-06-15 PROCEDURE — 7100000011 HC PHASE II RECOVERY - ADDTL 15 MIN: Performed by: SURGERY

## 2019-06-15 PROCEDURE — 3700000000 HC ANESTHESIA ATTENDED CARE: Performed by: SURGERY

## 2019-06-15 PROCEDURE — 2580000003 HC RX 258: Performed by: NURSE ANESTHETIST, CERTIFIED REGISTERED

## 2019-06-15 PROCEDURE — 2060000000 HC ICU INTERMEDIATE R&B

## 2019-06-15 PROCEDURE — 71046 X-RAY EXAM CHEST 2 VIEWS: CPT

## 2019-06-15 PROCEDURE — 2500000003 HC RX 250 WO HCPCS: Performed by: NURSE ANESTHETIST, CERTIFIED REGISTERED

## 2019-06-15 PROCEDURE — 80048 BASIC METABOLIC PNL TOTAL CA: CPT

## 2019-06-15 PROCEDURE — 82607 VITAMIN B-12: CPT

## 2019-06-15 PROCEDURE — 94760 N-INVAS EAR/PLS OXIMETRY 1: CPT

## 2019-06-15 PROCEDURE — 88342 IMHCHEM/IMCYTCHM 1ST ANTB: CPT

## 2019-06-15 RX ORDER — PROPOFOL 10 MG/ML
INJECTION, EMULSION INTRAVENOUS PRN
Status: DISCONTINUED | OUTPATIENT
Start: 2019-06-15 | End: 2019-06-15 | Stop reason: SDUPTHER

## 2019-06-15 RX ORDER — PANTOPRAZOLE SODIUM 40 MG/1
40 TABLET, DELAYED RELEASE ORAL
Status: DISCONTINUED | OUTPATIENT
Start: 2019-06-15 | End: 2019-06-16 | Stop reason: HOSPADM

## 2019-06-15 RX ORDER — SODIUM CHLORIDE, SODIUM LACTATE, POTASSIUM CHLORIDE, CALCIUM CHLORIDE 600; 310; 30; 20 MG/100ML; MG/100ML; MG/100ML; MG/100ML
INJECTION, SOLUTION INTRAVENOUS CONTINUOUS PRN
Status: DISCONTINUED | OUTPATIENT
Start: 2019-06-15 | End: 2019-06-15 | Stop reason: SDUPTHER

## 2019-06-15 RX ORDER — LIDOCAINE HYDROCHLORIDE 20 MG/ML
INJECTION, SOLUTION EPIDURAL; INFILTRATION; INTRACAUDAL; PERINEURAL PRN
Status: DISCONTINUED | OUTPATIENT
Start: 2019-06-15 | End: 2019-06-15 | Stop reason: SDUPTHER

## 2019-06-15 RX ADMIN — AMPICILLIN SODIUM AND SULBACTAM SODIUM 3 G: 2; 1 INJECTION, POWDER, FOR SOLUTION INTRAMUSCULAR; INTRAVENOUS at 15:43

## 2019-06-15 RX ADMIN — AMPICILLIN SODIUM AND SULBACTAM SODIUM 3 G: 2; 1 INJECTION, POWDER, FOR SOLUTION INTRAMUSCULAR; INTRAVENOUS at 20:47

## 2019-06-15 RX ADMIN — ENOXAPARIN SODIUM 40 MG: 40 INJECTION SUBCUTANEOUS at 20:47

## 2019-06-15 RX ADMIN — DONEPEZIL HYDROCHLORIDE 5 MG: 5 TABLET, FILM COATED ORAL at 20:47

## 2019-06-15 RX ADMIN — PROPOFOL 50 MG: 10 INJECTION, EMULSION INTRAVENOUS at 07:31

## 2019-06-15 RX ADMIN — MEMANTINE HYDROCHLORIDE 10 MG: 10 TABLET, FILM COATED ORAL at 10:06

## 2019-06-15 RX ADMIN — AMPICILLIN SODIUM AND SULBACTAM SODIUM 3 G: 2; 1 INJECTION, POWDER, FOR SOLUTION INTRAMUSCULAR; INTRAVENOUS at 05:06

## 2019-06-15 RX ADMIN — Medication 10 ML: at 10:07

## 2019-06-15 RX ADMIN — SODIUM CHLORIDE, POTASSIUM CHLORIDE, SODIUM LACTATE AND CALCIUM CHLORIDE: 600; 310; 30; 20 INJECTION, SOLUTION INTRAVENOUS at 07:25

## 2019-06-15 RX ADMIN — DIAZEPAM 5 MG: 5 TABLET ORAL at 10:06

## 2019-06-15 RX ADMIN — LIDOCAINE HYDROCHLORIDE 40 MG: 20 INJECTION, SOLUTION EPIDURAL; INFILTRATION; INTRACAUDAL; PERINEURAL at 07:31

## 2019-06-15 RX ADMIN — CALCIUM GLUCONATE 2 G: 94 INJECTION, SOLUTION INTRAVENOUS at 13:12

## 2019-06-15 RX ADMIN — DONEPEZIL HYDROCHLORIDE 5 MG: 5 TABLET, FILM COATED ORAL at 10:06

## 2019-06-15 RX ADMIN — IPRATROPIUM BROMIDE AND ALBUTEROL SULFATE 1 AMPULE: .5; 3 SOLUTION RESPIRATORY (INHALATION) at 15:58

## 2019-06-15 RX ADMIN — MEMANTINE HYDROCHLORIDE 10 MG: 10 TABLET, FILM COATED ORAL at 20:47

## 2019-06-15 RX ADMIN — Medication 10 ML: at 13:12

## 2019-06-15 RX ADMIN — IPRATROPIUM BROMIDE AND ALBUTEROL SULFATE 1 AMPULE: .5; 3 SOLUTION RESPIRATORY (INHALATION) at 20:00

## 2019-06-15 RX ADMIN — AMPICILLIN SODIUM AND SULBACTAM SODIUM 3 G: 2; 1 INJECTION, POWDER, FOR SOLUTION INTRAMUSCULAR; INTRAVENOUS at 10:08

## 2019-06-15 RX ADMIN — IPRATROPIUM BROMIDE AND ALBUTEROL SULFATE 1 AMPULE: .5; 3 SOLUTION RESPIRATORY (INHALATION) at 12:19

## 2019-06-15 RX ADMIN — PANTOPRAZOLE SODIUM 40 MG: 40 TABLET, DELAYED RELEASE ORAL at 10:06

## 2019-06-15 RX ADMIN — SODIUM CHLORIDE: 9 INJECTION, SOLUTION INTRAVENOUS at 01:41

## 2019-06-15 RX ADMIN — Medication 10 ML: at 15:43

## 2019-06-15 ASSESSMENT — PAIN SCALES - GENERAL
PAINLEVEL_OUTOF10: 0

## 2019-06-15 ASSESSMENT — PULMONARY FUNCTION TESTS
PIF_VALUE: 1
PIF_VALUE: 0

## 2019-06-15 NOTE — PROGRESS NOTES
Chief Complaint:  Abdominal pain  Subjective: The patient is alert. Feeling much better. No problems overnight. Denies chest pain. Intermittent SOB . Denies abdominal pain. Tolerating diet. No nausea or vomiting. Passing flatus. 6/14/2019-patient sitting in bedside chair; nasal cannula oxygen in place. States he feels much better; he did have bowel movement. No further hematemesis. Seen earlier today by surgery now scheduled for EGD in the a.m. due to the hematemesis. Pulmonary wants to have him wean from oxygen before discharge. Social service note from today appreciated. Patient states he is never had any history of heart murmur; he has had strokes. Electronic record states murmur present; patient unaware of the above. He does not recall ever having 2D echo performed here or [place]. Nasal cannula 5 L 96% saturation. Temperature 98.1 respiration 16 pulse 93 blood pressure 90/56. WBC 7.9 hemoglobin 13.7 platelets 285. Sodium 138 potassium 3.9 chloride 103 CO2 24 BUN 17 creatinine 1.7 glucose 112 calcium 8.3.    6/15/2019-patient sitting in bedside chair just finished eating. Family present. Dyspnea improving. No chest pain. Patient underwent EGD this a.m.; GERD Brar's esophagitis and mild gastritis. Patient seen earlier today by pulmonary; that note is appreciated. Patient had pulmonary functions done by the [facility] in 2017 which were normal.  3 L nasal cannula 98% saturation. Temperature 96.8 respirations 18 pulse 81 blood pressure 128/71. Chest x-ray with following results--  Findings: There is a stable, large cardiomediastinal silhouette with  underlying central pulmonary vascular congestion with bibasilar  airspace disease and bilateral pleural effusions. No pneumothorax.      Impression:       1. Stable, enlarged cardiac mediastinal silhouette with underlying  central pulmonary vascular congestion.   2. Nonspecific bibasilar airspace disease, findings can be seen in  infiltrate/pneumonia and/or atelectasis. . Bilateral pleural effusions. Sodium 136 potassium 3.7 chloride 105 CO2 22 BUN 19 creatinine 1.0 magnesium 1.8 glucose 127 calcium 7.8 phosphorus 3.2 protein 5.1 albumin 2.7 liver functions normal.  A1c 5.6 hemoglobin 12.1 WBC 7.1 platelets 216. Objective:    Vitals:    06/15/19 1220   BP:    Pulse:    Resp:    Temp:    SpO2: 98%     A&O x's 3, NC O2-5 L/min  Heart:  RRR, no, gallops, or rubs. Grade 1/6 to 2/6 systolic murmur second right intercostal space  Lungs:  CTA bilaterally, no wheeze, rales or rhonchi  Abd: bowel sounds present, nontender, nondistended, no masses  Extrem:  No clubbing, cyanosis, or edema  Tele: NSR    Lab Results   Component Value Date     06/15/2019    K 3.7 06/15/2019    K 4.3 2019     06/15/2019    CO2 22 06/15/2019    BUN 19 06/15/2019    CREATININE 1.0 06/15/2019    CALCIUM 7.8 06/15/2019      Lab Results   Component Value Date    WBC 7.1 06/15/2019    RBC 3.69 06/15/2019    HGB 12.1 06/15/2019    HCT 34.7 06/15/2019    MCV 94.0 06/15/2019    MCH 32.8 06/15/2019    MCHC 34.9 06/15/2019    RDW 11.8 06/15/2019     06/15/2019    MPV 10.4 06/15/2019     PT/INR:  No results found for: PROTIME, INR  No results for input(s): POCGLU in the last 72 hours. Recent Labs     19  0305 19  0320 06/15/19  0323    138 136   K 4.3 3.9 3.7    103 105   CO2 23 24 22   BUN 22 17 19   LABALBU  --   --  2.7*   CREATININE 1.3* 1.0 1.0   CALCIUM 8.3* 8.3* 7.8*   GFRAA >60 >60 >60   LABGLOM 53 >60 >60   GLUCOSE 108* 112* 127*       Ct Abdomen Pelvis Wo Contrast Additional Contrast? Oral    Result Date: 2019  Patient MRN:  [id number] : 1940 Age: 77 years Gender: Male Order Date:  2019 7:30 AM EXAM: CT ABDOMEN PELVIS WO CONTRAST number of images 384 Contrast. 50 mL of Omnipaque 240 orally Technique: Low-dose CT  acquisition technique included one of following options; 1 .  Automated exposure pulmonary infiltrate or pleural effusion. The pulmonary vascularity is unremarkable. The cardiac, hilar and mediastinal silhouettes are satisfactory. The bony thorax demonstrates no gross abnormality. Abdomen demonstrated the gas pattern to be nonspecific. There is no evidence of any free air there is osteoarthritic changes of the lumbar spine with mild levoscoliosis. Patient is status post brachytherapy with multiple seeds seen in the prostate. NO ACUTE CARDIOPULMONARY PROCESS No evidence of any free air Nonspecific gas pattern     Ct Abdomen Pelvis W Iv Contrast Additional Contrast? None    Result Date: 6/11/2019  This examination and all examinations utilizing ionizing radiation at this facility are done so according to the ALARA (as low as reasonably achievable) principal for radiation dose reduction. Axial, sagittal, and coronal computed tomography of the abdomen and pelvis was performed following intravenous administration of 110 mL of Isovue 3 7. Consolidation within the bilateral lung bases may reflect atelectasis although pneumonic infectious infiltrate should be excluded. There are calcified pleural plaques bilaterally. The heart is not enlarged. There is fluid throughout the lower esophagus into the stomach. In the abdomen, 12 mm low-density lesion within the central portion of the liver is probably a benign cyst or hemangioma. The liver is otherwise unremarkable gallbladder and hepatobiliary tree or mild atrophy of the otherwise normal-appearing pancreas. Spleen and is a few small splenules are normal in appearance. Adrenal glands show no evidence of thickening or nodule. The kidneys are negative for evidence of solid mass or hydronephrosis. Within the right lower quadrant of the abdomen, there is no evidence for appendicitis. Loops of bowel within the left mid abdomen exceed 3 cm in cross-section. No evidence of free air or free fluid. Transition to normal caliber is within the mid abdomen. Within the pelvis, urinary bladder is unremarkable. There are radiation beads within the prostate gland. No free fluid or adenopathy in the pelvis Degenerative spondylosis and facet arthropathy are identified within the spine. Osteophytosis and eburnation of sacroiliac joints and hips. The skeletal structures are negative for evidence of aggressive process or acute fracture. IMPRESSIONS: Small bowel obstruction versus ileus. Consolidation within the bilateral lung bases may reflect infectious pneumonia or atelectasis. Cta Pulmonary W Contrast    Result Date: 6/11/2019  Clinical indications: Shortness of breath. Right upper quadrant pain. Hypoxia. COMPARISON: Initial radiographs earlier today. Exposure control: This examination and all examinations utilizing ionizing radiation at this facility done so according to the ALARA (as low as reasonably achievable) principal for radiation dose reduction. Technique: Axial, sagittal, and coronal computed tomography of the chest was performed following intravenous administration of 90 mL of Isovue-370. 3-D postprocessing. Three-dimensional reconstructions of the arterial tree. MIP imaging. FINDINGS: Evaluation of the pulmonary arterial tree reveals no intraluminal filling defect to suggest embolic phenomenon. There is no evidence of aortic dissection. Within the thoracic inlet, the thyroid gland is unremarkable. The major airways are patent. There is no mediastinal or hilar mass or lymphadenopathy. The heart is not enlarged. There is no evidence of pericardial fluid. Esophagus is fluid-filled throughout its course within the chest and into the stomach. Evaluation of the lung parenchyma reveals consolidation within the bilateral lower lobes posteriorly which could reflect atelectasis although infectious pneumonic infiltrate should be considered. There are calcified pleural plaques along the bilateral posterior costophrenic angles and the anterior upper lobes bilaterally. There is scarring of the apices. The lungs are otherwise negative for dominant mass or airspace consolidation. Within the upper abdomen, there is no evidence for acute or worrisome process. Skeletal structures are negative for evidence of aggressive process or acute fracture. Consolidation within the bilateral lower lobes posteriorly could reflect atelectasis although infectious pneumonic infiltrate should be considered. Fluid-filled esophagus throughout its course. No evidence for pulmonary embolism or aortic dissection. Xr Abdomen For Ng/og/ne Tube Placement    Result Date: 2019  Patient MRN: [id number] : 1940 Age:  77 years Gender: Male Order Date: 2019 7:00 PM Exam: XR ABDOMEN FOR NG/OG/NE TUBE PLACEMENT Number of Images: 2 views Indication:   Confirmation of course of NG/OG/NE tube and location of tip of tube Confirmation of course of NG/OG/NE tube and location of tip of tube Portable?-> Yes Comparison: None. Findings: Lower chest upper abdomen demonstrate nasogastric tube is in satisfactory position. There is barium seen in the transverse colon from recent barium study. The gas pattern is nonspecific. The nasogastric tube appears to be in satisfactory position with the tip in the stomach. Scheduled Meds:   pantoprazole  40 mg Oral QAM AC    bisacodyl  10 mg Rectal Daily    memantine  10 mg Oral BID    donepezil  5 mg Oral BID    diazepam  5 mg Oral Daily    ampicillin-sulbactam  3 g Intravenous Q6H    lidocaine   Topical Once    enoxaparin  40 mg Subcutaneous Daily    ipratropium-albuterol  1 ampule Inhalation Q4H WA     Continuous Infusions:    PRN Meds:.barium sulfate, barium sulfate, barium sulfate, sodium chloride flush, magnesium hydroxide, ondansetron    I/O last 3 completed shifts: In: 2521 [P.O.:840;  I.V.:1581; IV Piggyback:100]  Out: 2150 [Urine:2150]  I/O this shift:  In: 350 [I.V.:350]  Out: 1150 [Urine:1150]    Intake/Output Summary (Last 24 hours) at 6/15/2019 1241  Last data filed at 6/15/2019 1139  Gross per 24 hour   Intake 2471 ml   Output 3300 ml   Net -829 ml       Assessment:    Active Hospital Problems    Diagnosis    Stage 3 chronic kidney disease ([facility] [N18.3]    Abdominal aortic aneurysm (AAA) 3.0 cm to 5.5 cm in diameter in male [facility]) [I71.4]    Acute respiratory failure with hypoxia (HCC) [J96.01]    Aspiration pneumonia ([facility] [J69.0]    Sepsis ([facility] [A41.9]    Hematemesis [K92.0]    Pneumonia [J18.9]    SBO (small bowel obstruction) ([facility] [K56.609]    Lactic acidosis [E87.2]    Vascular dementia without behavioral disturbance [F01.50]    History of stroke [Z86.73]       Plan:  Surgical consult with f/u pending             Pulmonary consult reviewed with ATB changes             Lactic acid nl             SBO released on repeat xray             Lab reviewed--obinna brumfield, RFT stable     6/14/2019-  Obtain 2D echo  EGD in the a.m.   Wean oxygen  Monitor labs  Unasyn 3 g IV every 6 hours  Augmentin by mouth at time of discharge  Aerosol treatments    6/15/2019  Calcium gluconate 2 g IV today  Monitor labs  Continue aerosol treatments  Continue Unasyn until discharge then Augmentin  Continue weaning oxygen        [address]  12:41 PM  6/15/2019

## 2019-06-15 NOTE — ANESTHESIA POSTPROCEDURE EVALUATION
Department of Anesthesiology  Postprocedure Note    Patient: Gene Steinberg  MRN: [id number]  YOB: 1940  Date of evaluation: 6/15/2019  Time:  12:55 PM     Procedure Summary     Date:  06/15/19 Room / Location:  [facility] OR 01 / [facility] OR    Anesthesia Start:  0725 Anesthesia Stop:  1733    Procedure:  EGD ESOPHAGOGASTRODUODENOSCOPY WITH BIOPSY (N/A ) Diagnosis:  (-)    Surgeon:  Cierra Rodas MD Responsible Provider:  Warren Rea MD    Anesthesia Type:  MAC ASA Status:  4          Anesthesia Type: MAC    Mirian Phase I:      Mirian Phase II: Mirian Score: 9    Last vitals: Reviewed and per EMR flowsheets.        Anesthesia Post Evaluation    Patient location during evaluation: PACU  Patient participation: complete - patient participated  Level of consciousness: awake  Airway patency: patent  Nausea & Vomiting: no nausea and no vomiting  Complications: no  Cardiovascular status: hemodynamically stable  Respiratory status: acceptable  Hydration status: stable

## 2019-06-15 NOTE — PROGRESS NOTES
Kandace Anaya M.D.,[facility]  Jordan Matta D.O., F.A.C.OORLIN., Jj Leal M.D. Brittaney Payne M.D., Robert Serna M.D. Daily Pulmonary Progress Note    Patient:  Anirudh Jack 77 y.o. male MRN: [id number]     Date of Service: 6/15/2019      Synopsis     We are following patient for aspiration pneumonia    \"CC\" : Nausea and vomiting    Code status: Full code      Subjective      Patient was seen and examined. Wife and daughter at the bedside. Just back from EGD findingd noted. Patient feels well, denies any SOB, mild intermittent non productive cough. Saturating 94-96% on 4l/min    Review of Systems:  Constitutional: Denies fever, weight loss, night sweats, and fatigue  Skin: Denies pigmentation, dark lesions, and rashes   HEENT: Denies hearing loss, tinnitus, ear drainage, epistaxis, sore throat, and hoarseness. Cardiovascular: Denies palpitations, chest pain, and chest pressure. Respiratory: Denies cough, dyspnea at rest, hemoptysis, apnea, and choking.   Gastrointestinal: Denies nausea, vomiting, poor appetite, diarrhea, heartburn or reflux  Genitourinary: Denies dysuria, frequency, urgency or hematuria  Musculoskeletal: Denies myalgias, muscle weakness, and bone pain  Neurological: Denies dizziness, vertigo, headache, and focal weakness  Psychological: Denies anxiety and depression  Endocrine: Denies heat intolerance and cold intolerance  Hematopoietic/Lymphatic: Denies bleeding problems and blood transfusions    24-hour events:      Objective   Vitals: BP (!) 144/76   Pulse 81   Temp 98.4 °F (36.9 °C)   Resp 18   Ht 5' 7\" (1.702 m)   Wt 172 lb 14.4 oz (78.4 kg)   SpO2 92%   BMI 27.08 kg/m²     I/O:    Intake/Output Summary (Last 24 hours) at 6/15/2019 0859  Last data filed at 6/15/2019 0737  Gross per 24 hour   Intake 2871 ml   Output 2150 ml   Net 721 ml       Vent Information  FiO2 : 40 %                CURRENT MEDS :  Scheduled Meds:   pantoprazole  40 mg Oral QAM AC    bisacodyl  10 mg Rectal Daily    memantine  10 mg Oral BID    donepezil  5 mg Oral BID    diazepam  5 mg Oral Daily    ampicillin-sulbactam  3 g Intravenous Q6H    lidocaine   Topical Once    enoxaparin  40 mg Subcutaneous Daily    ipratropium-albuterol  1 ampule Inhalation Q4H WA       Physical Exam:  General Appearance: appears comfortable in no acute distress. HEENT: Normocephalic atraumatic without obvious abnormality   Neck: Lips, mucosa, and tongue normal.  Supple, symmetrical, trachea midline, no adenopathy;thyroid:  no enlargement/tenderness/nodules or JVD. Lung: clear to auscultation bilaterally. Symmetrical expansion. Heart: RRR, normal S1, S2. No MRG  Abdomen: Soft, NT, ND. BS present x 4 quadrants. No bruit or organomegaly. Extremities: Pedal pulses 2+ symmetric b/l. Extremities normal, no cyanosis, clubbing, or edema. Musculokeletal: No joint swelling, no muscle tenderness. ROM normal in all joints of extremities. Neurologic: Mental status: Alert and Oriented X3 . Pertinent/ New Labs and Imaging Studies     Imaging Personally Reviewed:  CXR ordered today      Labs:  Lab Results   Component Value Date    WBC 7.1 06/15/2019    HGB 12.1 06/15/2019    HCT 34.7 06/15/2019    MCV 94.0 06/15/2019    MCH 32.8 06/15/2019    MCHC 34.9 06/15/2019    RDW 11.8 06/15/2019     06/15/2019    MPV 10.4 06/15/2019     Lab Results   Component Value Date     06/15/2019    K 3.7 06/15/2019    K 4.3 06/11/2019     06/15/2019    CO2 22 06/15/2019    BUN 19 06/15/2019    CREATININE 1.0 06/15/2019    LABALBU 2.7 06/15/2019    CALCIUM 7.8 06/15/2019    GFRAA >60 06/15/2019    LABGLOM >60 06/15/2019     No results found for: PROTIME, INR  No results for input(s): PROBNP in the last 72 hours. No results for input(s): PROCAL in the last 72 hours. This SmartLink has not been configured with any valid records.        Micro:  resp panel negative  [place], strep urine antigen are neagtive     Assessment:    1. Aspiration pneumonia  2. Acute hypoxic resp failure secondary to #1- improving  3. Sepsis, lactic acidosis- improved  4. Pleural plaques      Plan:   1. Continue unasyn (#4/7), will switch to Augmentin upon discharge ,   2. Wean FiO2 for saturations above 94%, Patient will need ambulatory pulse oximetry prior to discharge. 3. Courage to continue incentive spirometry, patient generates tidal volumes up to 1200  4. Continue Speech therapy   5. EDG results noted  6. Reviewed patient record from  the [place] with wife and daughter. The result of PFT on the record was from 2017 and patient had normal PFTs at that time.     Patient will need follow up CT chest in 6 -8 weeks    Electronically signed by Giovanna Mills MD on 6/15/2019 at 8:59 AM

## 2019-06-15 NOTE — OP NOTE
Endoscopic  Procedure Note    Procedure: Esophagogastroduodenoscopy with biopsy    Pre-operative Diagnosis: Hematemesis    Post-operative Diagnosis: GERD and Brar's esophagus and mild gastritis    Sedation: MAC        Complications: None            Disposition: PACU - hemodynamically stable.            Condition: stable        Marta Hoang 6/15/2019 7:11 AM

## 2019-06-15 NOTE — OP NOTE
[address] [address]                                OPERATIVE REPORT    PATIENT NAME: Batool Romero                  :        1940  MED REC NO:   [id number]                            ROOM:       1895  ACCOUNT NO:   [de-identified]                           ADMIT DATE: 2019  PROVIDER:     Ramandeep Henriquez MD    DATE OF PROCEDURE:  06/15/2019    PREOPERATIVE DIAGNOSIS:  Hematemesis. POSTOPERATIVE DIAGNOSES:  1. Reflux disease with changes consistent with Brar's esophagus. 2.  Mild active gastritis. PROCEDURE PERFORMED:  Esophagogastroduodenoscopy with biopsies. SURGEON:  Ramandeep Henriquez M.D. ANESTHESIA:  MAC. DETAILS OF PROCEDURE:  The patient was brought to the operative suite  and placed on the table in the left lateral decubitus position. The  patient received anesthesia per the Department of Anesthesiology. A  bite block was placed. The endoscope was passed without difficulty  through the lumen of the esophagus, stomach, and duodenum. The  endoscope was retrieved. The duodenum was normal.  Upon reentering the  stomach, the patient had a mild gastritis. Biopsies were obtained. The  retroflexed view did not reveal a hiatal hernia. The GE junction was  marked at 40 cm from the incisors with changes consistent with reflux  disease and Brar's esophagus. The remainder of the examination was  uneventful. The patient tolerated the procedure well. ASSESSMENT:  1. Reflux disease with Brar's esophagus. 2.  Mild gastritis. PLAN:  1. The plan will be to continue the proton-pump inhibitor. 2.  Antireflux diet. 3.  To monitor the patient's hemoglobin and hematocrit.         Ayo Kerns MD    D: 06/15/2019 7:50:12       T: 06/15/2019 8:01:31     [email or web address]  Job#: [id number]     Doc#: [id number]    CC:  Mac Garcia DO

## 2019-06-16 VITALS
TEMPERATURE: 98.7 F | BODY MASS INDEX: 27.14 KG/M2 | SYSTOLIC BLOOD PRESSURE: 133 MMHG | HEART RATE: 92 BPM | RESPIRATION RATE: 18 BRPM | WEIGHT: 172.9 LBS | HEIGHT: 67 IN | DIASTOLIC BLOOD PRESSURE: 76 MMHG | OXYGEN SATURATION: 92 %

## 2019-06-16 LAB
ANION GAP SERPL CALCULATED.3IONS-SCNC: 11 MMOL/L (ref 7–16)
BLOOD CULTURE, ROUTINE: NORMAL
BUN BLDV-MCNC: 19 MG/DL (ref 8–23)
CALCIUM SERPL-MCNC: 8.4 MG/DL (ref 8.6–10.2)
CHLORIDE BLD-SCNC: 108 MMOL/L (ref 98–107)
CO2: 22 MMOL/L (ref 22–29)
CREAT SERPL-MCNC: 1 MG/DL (ref 0.7–1.2)
CULTURE, BLOOD 2: NORMAL
GFR AFRICAN AMERICAN: >60
GFR NON-AFRICAN AMERICAN: >60 ML/MIN/1.73
GLUCOSE BLD-MCNC: 120 MG/DL (ref 74–99)
MAGNESIUM: 1.9 MG/DL (ref 1.6–2.6)
PHOSPHORUS: 4.1 MG/DL (ref 2.5–4.5)
POTASSIUM SERPL-SCNC: 4.1 MMOL/L (ref 3.5–5)
SODIUM BLD-SCNC: 141 MMOL/L (ref 132–146)

## 2019-06-16 PROCEDURE — 2580000003 HC RX 258: Performed by: SURGERY

## 2019-06-16 PROCEDURE — 6370000000 HC RX 637 (ALT 250 FOR IP): Performed by: SURGERY

## 2019-06-16 PROCEDURE — 94668 MNPJ CHEST WALL SBSQ: CPT

## 2019-06-16 PROCEDURE — 6360000002 HC RX W HCPCS: Performed by: SURGERY

## 2019-06-16 PROCEDURE — 83735 ASSAY OF MAGNESIUM: CPT

## 2019-06-16 PROCEDURE — 94640 AIRWAY INHALATION TREATMENT: CPT

## 2019-06-16 PROCEDURE — 36415 COLL VENOUS BLD VENIPUNCTURE: CPT

## 2019-06-16 PROCEDURE — 80048 BASIC METABOLIC PNL TOTAL CA: CPT

## 2019-06-16 PROCEDURE — 2700000000 HC OXYGEN THERAPY PER DAY

## 2019-06-16 PROCEDURE — 84100 ASSAY OF PHOSPHORUS: CPT

## 2019-06-16 RX ORDER — AMOXICILLIN AND CLAVULANATE POTASSIUM 875; 125 MG/1; MG/1
1 TABLET, FILM COATED ORAL EVERY 12 HOURS SCHEDULED
Qty: 20 TABLET | Refills: 0 | Status: SHIPPED | OUTPATIENT
Start: 2019-06-16 | End: 2019-06-26

## 2019-06-16 RX ORDER — AMOXICILLIN AND CLAVULANATE POTASSIUM 875; 125 MG/1; MG/1
1 TABLET, FILM COATED ORAL EVERY 12 HOURS SCHEDULED
Status: DISCONTINUED | OUTPATIENT
Start: 2019-06-16 | End: 2019-06-16 | Stop reason: HOSPADM

## 2019-06-16 RX ADMIN — Medication 10 ML: at 09:05

## 2019-06-16 RX ADMIN — IPRATROPIUM BROMIDE AND ALBUTEROL SULFATE 1 AMPULE: .5; 3 SOLUTION RESPIRATORY (INHALATION) at 12:37

## 2019-06-16 RX ADMIN — DONEPEZIL HYDROCHLORIDE 5 MG: 5 TABLET, FILM COATED ORAL at 09:05

## 2019-06-16 RX ADMIN — AMPICILLIN SODIUM AND SULBACTAM SODIUM 3 G: 2; 1 INJECTION, POWDER, FOR SOLUTION INTRAMUSCULAR; INTRAVENOUS at 09:05

## 2019-06-16 RX ADMIN — MEMANTINE HYDROCHLORIDE 10 MG: 10 TABLET, FILM COATED ORAL at 09:05

## 2019-06-16 RX ADMIN — AMPICILLIN SODIUM AND SULBACTAM SODIUM 3 G: 2; 1 INJECTION, POWDER, FOR SOLUTION INTRAMUSCULAR; INTRAVENOUS at 03:02

## 2019-06-16 RX ADMIN — PANTOPRAZOLE SODIUM 40 MG: 40 TABLET, DELAYED RELEASE ORAL at 06:18

## 2019-06-16 RX ADMIN — DIAZEPAM 5 MG: 5 TABLET ORAL at 09:05

## 2019-06-16 ASSESSMENT — PAIN SCALES - GENERAL
PAINLEVEL_OUTOF10: 0
PAINLEVEL_OUTOF10: 0

## 2019-06-16 NOTE — PROGRESS NOTES
Pulse ox was 88% on room air at rest.  Pulse ox was 92% on 3L at rest.   Ambulating pulse ox was 90% on 3L of O2.

## 2019-06-16 NOTE — PROGRESS NOTES
Chief Complaint:  Abdominal pain  Subjective: The patient is alert. Feeling much better. No problems overnight. Denies chest pain. Intermittent SOB . Denies abdominal pain. Tolerating diet. No nausea or vomiting. Passing flatus. 6/14/2019-patient sitting in bedside chair; nasal cannula oxygen in place. States he feels much better; he did have bowel movement. No further hematemesis. Seen earlier today by surgery now scheduled for EGD in the a.m. due to the hematemesis. Pulmonary wants to have him wean from oxygen before discharge. Social service note from today appreciated. Patient states he is never had any history of heart murmur; he has had strokes. Electronic record states murmur present; patient unaware of the above. He does not recall ever having 2D echo performed here or [place]. Nasal cannula 5 L 96% saturation. Temperature 98.1 respiration 16 pulse 93 blood pressure 90/56. WBC 7.9 hemoglobin 13.7 platelets 984. Sodium 138 potassium 3.9 chloride 103 CO2 24 BUN 17 creatinine 1.7 glucose 112 calcium 8.3.    6/15/2019-patient sitting in bedside chair just finished eating. Family present. Dyspnea improving. No chest pain. Patient underwent EGD this a.m.; GERD Brar's esophagitis and mild gastritis. Patient seen earlier today by pulmonary; that note is appreciated. Patient had pulmonary functions done by the [place] in 2017 which were normal.  3 L nasal cannula 98% saturation. Temperature 96.8 respirations 18 pulse 81 blood pressure 128/71. Chest x-ray with following results--  Findings: There is a stable, large cardiomediastinal silhouette with  underlying central pulmonary vascular congestion with bibasilar  airspace disease and bilateral pleural effusions. No pneumothorax.      Impression:       1. Stable, enlarged cardiac mediastinal silhouette with underlying  central pulmonary vascular congestion.   2. Nonspecific bibasilar airspace disease, findings can be seen in  infiltrate/pneumonia and/or atelectasis. . Bilateral pleural effusions. Sodium 136 potassium 3.7 chloride 105 CO2 22 BUN 19 creatinine 1.0 magnesium 1.8 glucose 127 calcium 7.8 phosphorus 3.2 protein 5.1 albumin 2.7 liver functions normal.  A1c 5.6 hemoglobin 12.1 WBC 7.1 platelets 635.    9/36/[id number] sitting in bedside chair states he feels much better. No chest pain little dyspnea while on oxygen. His eating has been fine no abdominal pains. Patient hoping for discharge today. Patient seen earlier today by pulmonary; they believe patient is stable enough for discharge on 3 L nasal cannula oxygen. Note states they have made arrangements for same. Earlier today patient was 88% on room air at rest; 92% on 3 L at rest; 90% while ambulating with 3 L oxygen. Temperature 98.7 respirations 18 pulse 92 blood pressure 133/76. Still on nasal cannula oxygen, 4 L with 92% saturation. Sodium 141 potassium 4.1 chloride 108 CO2 22 BUN 19 creatinine 1.0 magnesium 1.9 glucose 120 calcium 8.4 phosphorus 4.1. Objective:    Vitals:    06/16/19 0942   BP:    Pulse:    Resp:    Temp:    SpO2: 92%     A&O x's 3, NC O2-5 L/min  Heart:  RRR, no, gallops, or rubs.   Grade 1/6 to 2/6 systolic murmur second right intercostal space  Lungs:  CTA bilaterally, no wheeze, rales or rhonchi  Abd: bowel sounds present, nontender, nondistended, no masses  Extrem:  No clubbing, cyanosis, or edema  Tele: NSR    Lab Results   Component Value Date     06/16/2019    K 4.1 06/16/2019    K 4.3 06/11/2019     06/16/2019    CO2 22 06/16/2019    BUN 19 06/16/2019    CREATININE 1.0 06/16/2019    CALCIUM 8.4 06/16/2019      Lab Results   Component Value Date    WBC 7.1 06/15/2019    RBC 3.69 06/15/2019    HGB 12.1 06/15/2019    HCT 34.7 06/15/2019    MCV 94.0 06/15/2019    MCH 32.8 06/15/2019    MCHC 34.9 06/15/2019    RDW 11.8 06/15/2019     06/15/2019    MPV 10.4 06/15/2019     PT/INR:  No results found for: PROTIME, INR  No results for input(s): POCGLU in the last 72 hours. Recent Labs     19  0320 06/15/19  0323 19  0430    136 141   K 3.9 3.7 4.1    105 108*   CO2 24 22 22   BUN 17 19 19   LABALBU  --  2.7*  --    CREATININE 1.0 1.0 1.0   CALCIUM 8.3* 7.8* 8.4*   GFRAA >60 >60 >60   LABGLOM >60 >60 >60   GLUCOSE 112* 127* 120*       Ct Abdomen Pelvis Wo Contrast Additional Contrast? Oral    Result Date: 2019  Patient MRN:  [id number] : 1940 Age: 77 years Gender: Male Order Date:  2019 7:30 AM EXAM: CT ABDOMEN PELVIS WO CONTRAST number of images 384 Contrast. 50 mL of Omnipaque 240 orally Technique: Low-dose CT  acquisition technique included one of following options; 1 . Automated exposure control, 2. Adjustment of MA and or KV according to patient's size or 3. Use of iterative reconstruction. INDICATION:  eval pSBO  COMPARISON: 2019 FINDINGS: The lung bases demonstrate persistent  patchy bibasilar infiltrates and pleural effusions. There is mild cardiomegaly with coronary artery calcification and calcified pleural plaques bilaterally. The liver is of normal architecture. Contrast is identified in the gallbladder. The spleen, pancreas, the adrenals and the kidneys are normal. There is mild focal aneurysm of the abdominal aorta measuring 2.4 x 3.2 cm. There is severe degenerative changes in the lumbar spine. There is persistent moderately dilated proximal small bowel loops measuring up to 3.3 cm with a mild improvement in the gastric distention. The distal small bowel loops and colon are partially collapsed. Nonspecific lymph nodes are identified in the mesentery probably inflammatory. Pelvis. Bladder is collapsed with a Ralph catheter. Radiation seeds are identified in the prostate gland. There is diverticulosis of colon without diverticulitis. There is constipation. The appendix is noted.      Persistent small bowel dilatation with  moderately dilated proximal small bowel loops and collapsed distal small bowel loops and colon. There is mild improvement in gastric distention. Persistent infiltrates and pleural effusion in lung bases likely CHF or pneumonia. Mild abdominal aortic aneurysm. Xr Acute Abd Series Chest 1 Vw    Result Date: 2019  Patient MRN: [id number] : 1940 Age:  77 years Gender: Male Order Date: 2019 2:45 PM Exam: XR ACUTE ABD SERIES CHEST 1 VW Number of Images: 5 views Indication:   pain, r/o free air pain, r/o free air Comparison: None. Findings: The lungs demonstrate ill-defined pleural calcification more pronounced on the left as compared to the right. This is most pronounced in the left lung base also in the left lateral thorax. . There is no evidence of pulmonary infiltrate or pleural effusion. The pulmonary vascularity is unremarkable. The cardiac, hilar and mediastinal silhouettes are satisfactory. The bony thorax demonstrates no gross abnormality. Abdomen demonstrated the gas pattern to be nonspecific. There is no evidence of any free air there is osteoarthritic changes of the lumbar spine with mild levoscoliosis. Patient is status post brachytherapy with multiple seeds seen in the prostate. NO ACUTE CARDIOPULMONARY PROCESS No evidence of any free air Nonspecific gas pattern     Ct Abdomen Pelvis W Iv Contrast Additional Contrast? None    Result Date: 2019  This examination and all examinations utilizing ionizing radiation at this facility are done so according to the ALARA (as low as reasonably achievable) principal for radiation dose reduction. Axial, sagittal, and coronal computed tomography of the abdomen and pelvis was performed following intravenous administration of 110 mL of Isovue 3 7. Consolidation within the bilateral lung bases may reflect atelectasis although pneumonic infectious infiltrate should be excluded. There are calcified pleural plaques bilaterally. The heart is not enlarged.  There is fluid throughout the lower esophagus into the stomach. In the abdomen, 12 mm low-density lesion within the central portion of the liver is probably a benign cyst or hemangioma. The liver is otherwise unremarkable gallbladder and hepatobiliary tree or mild atrophy of the otherwise normal-appearing pancreas. Spleen and is a few small splenules are normal in appearance. Adrenal glands show no evidence of thickening or nodule. The kidneys are negative for evidence of solid mass or hydronephrosis. Within the right lower quadrant of the abdomen, there is no evidence for appendicitis. Loops of bowel within the left mid abdomen exceed 3 cm in cross-section. No evidence of free air or free fluid. Transition to normal caliber is within the mid abdomen. Within the pelvis, urinary bladder is unremarkable. There are radiation beads within the prostate gland. No free fluid or adenopathy in the pelvis Degenerative spondylosis and facet arthropathy are identified within the spine. Osteophytosis and eburnation of sacroiliac joints and hips. The skeletal structures are negative for evidence of aggressive process or acute fracture. IMPRESSIONS: Small bowel obstruction versus ileus. Consolidation within the bilateral lung bases may reflect infectious pneumonia or atelectasis. Cta Pulmonary W Contrast    Result Date: 6/11/2019  Clinical indications: Shortness of breath. Right upper quadrant pain. Hypoxia. COMPARISON: Initial radiographs earlier today. Exposure control: This examination and all examinations utilizing ionizing radiation at this facility done so according to the ALARA (as low as reasonably achievable) principal for radiation dose reduction. Technique: Axial, sagittal, and coronal computed tomography of the chest was performed following intravenous administration of 90 mL of Isovue-370. 3-D postprocessing. Three-dimensional reconstructions of the arterial tree. MIP imaging.  FINDINGS: Evaluation of the pulmonary arterial tree reveals no intraluminal filling defect to suggest embolic phenomenon. There is no evidence of aortic dissection. Within the thoracic inlet, the thyroid gland is unremarkable. The major airways are patent. There is no mediastinal or hilar mass or lymphadenopathy. The heart is not enlarged. There is no evidence of pericardial fluid. Esophagus is fluid-filled throughout its course within the chest and into the stomach. Evaluation of the lung parenchyma reveals consolidation within the bilateral lower lobes posteriorly which could reflect atelectasis although infectious pneumonic infiltrate should be considered. There are calcified pleural plaques along the bilateral posterior costophrenic angles and the anterior upper lobes bilaterally. There is scarring of the apices. The lungs are otherwise negative for dominant mass or airspace consolidation. Within the upper abdomen, there is no evidence for acute or worrisome process. Skeletal structures are negative for evidence of aggressive process or acute fracture. Consolidation within the bilateral lower lobes posteriorly could reflect atelectasis although infectious pneumonic infiltrate should be considered. Fluid-filled esophagus throughout its course. No evidence for pulmonary embolism or aortic dissection. Xr Abdomen For Ng/og/ne Tube Placement    Result Date: 2019  Patient MRN: [id number] : 1940 Age:  77 years Gender: Male Order Date: 2019 7:00 PM Exam: XR ABDOMEN FOR NG/OG/NE TUBE PLACEMENT Number of Images: 2 views Indication:   Confirmation of course of NG/OG/NE tube and location of tip of tube Confirmation of course of NG/OG/NE tube and location of tip of tube Portable?-> Yes Comparison: None. Findings: Lower chest upper abdomen demonstrate nasogastric tube is in satisfactory position. There is barium seen in the transverse colon from recent barium study. The gas pattern is nonspecific.      The nasogastric tube appears to be in satisfactory position with the tip in the stomach. Scheduled Meds:   amoxicillin-clavulanate  1 tablet Oral 2 times per day    pantoprazole  40 mg Oral QAM AC    bisacodyl  10 mg Rectal Daily    memantine  10 mg Oral BID    donepezil  5 mg Oral BID    diazepam  5 mg Oral Daily    lidocaine   Topical Once    enoxaparin  40 mg Subcutaneous Daily    ipratropium-albuterol  1 ampule Inhalation Q4H WA     Continuous Infusions:    PRN Meds:.barium sulfate, barium sulfate, barium sulfate, sodium chloride flush, magnesium hydroxide, ondansetron    I/O last 3 completed shifts: In: 18 [P.O.:220; I.V.:350]  Out: 2450 [Urine:2450]  I/O this shift:  In: 240 [P.O.:240]  Out: 400 [Urine:400]    Intake/Output Summary (Last 24 hours) at 6/16/2019 1235  Last data filed at 6/16/2019 1156  Gross per 24 hour   Intake 460 ml   Output 1700 ml   Net -1240 ml       Assessment:    Active Hospital Problems    Diagnosis    Stage 3 chronic kidney disease ([facility] [N18.3]    Abdominal aortic aneurysm (AAA) 3.0 cm to 5.5 cm in diameter in male [facility]) [I71.4]    Acute respiratory failure with hypoxia (HCC) [J96.01]    Aspiration pneumonia ([facility] [J69.0]    Sepsis ([facility] [A41.9]    Hematemesis [K92.0]    Pneumonia [J18.9]    SBO (small bowel obstruction) ([facility] [K56.609]    Lactic acidosis [E87.2]    Vascular dementia without behavioral disturbance [F01.50]    History of stroke [Z86.73]       Plan:  Surgical consult with f/u pending             Pulmonary consult reviewed with ATB changes             Lactic acid nl             SBO released on repeat xray             Lab reviewed--BHUPENDRA hoang stable     6/14/2019-  Obtain 2D echo  EGD in the a.m.   Wean oxygen  Monitor labs  Unasyn 3 g IV every 6 hours  Augmentin by mouth at time of discharge  Aerosol treatments    6/15/2019  Calcium gluconate 2 g IV today  Monitor labs  Continue aerosol treatments  Continue Unasyn until discharge then Augmentin  Continue weaning oxygen    6/16/2019  Med reconciliation completed  Prescriptions written home health care orders entered [place] versus other  3 L nasal cannula oxygen, per pulmonary  Augmentin 875 mg twice daily  Continue Namenda Aricept Valium  Follow-up Dr. Parisa Bolden 7 to 10 days  [facility]        [address]  12:35 PM  6/16/2019

## 2019-06-16 NOTE — CARE COORDINATION
Social Work / Discharge Planning : SW updated patient will need home 02. RN complete pulse ox and Order for DME noted. SW spoke to JIHAN at [facility] and they will deliver patient portable 02 to the hospital prior to discharge. RN and charge RN updated and envelope with hard copies for patient 02 given to RN to give to [facility] jesse Riddle from [facility] aware. SW to follow.  Electronically signed by LALA Ahmadi on 6/16/2019 at 1:14 PM

## 2019-06-16 NOTE — PROGRESS NOTES
Discharge instructions given, all questions answered, no comments or concerns. Son, Alexandrea Alvarez, at beside. Portable O2 tank delivered and in room. Heart monitor and IV access removed prior to discharge. Wheelchair placed in ADT.

## 2019-06-16 NOTE — PROGRESS NOTES
Kathrin Chirinos M.D.,[facility]  Sondra Bhardwaj D.O., F.A.C.O.I., Rosa Liu M.D. Gracy Boast, M.D., Javi Milligan M.D. Daily Pulmonary Progress Note    Patient:  Scout Ceballos 77 y.o. male MRN: [id number]     Date of Service: 6/16/2019      Synopsis     We are following patient for aspiration pneumonia    \"CC\" : Nausea and vomiting    Code status: Full code      Subjective      6/15 Patient was seen and examined. Wife and daughter at the bedside. Just back from EGD findingd noted. Patient feels well, denies any SOB, mild intermittent non productive cough. Saturating 94-96% on 4l/min    6/16 she is sitting up in bedside chair, awake and alert in no acute distress. On 3 L of oxygen saturating 94 to 97%. Performed  an ambulatory pulse oximetry. desaturated down to 88% on room air at rest.  Has no cough no shortness of breath no chest pain no nausea vomiting. Review of Systems:  Constitutional: Denies fever, weight loss, night sweats, and fatigue  Skin: Denies pigmentation, dark lesions, and rashes   HEENT: Denies hearing loss, tinnitus, ear drainage, epistaxis, sore throat, and hoarseness. Cardiovascular: Denies palpitations, chest pain, and chest pressure. Respiratory: Denies cough, dyspnea at rest, hemoptysis, apnea, and choking.   Gastrointestinal: Denies nausea, vomiting, poor appetite, diarrhea, heartburn or reflux  Genitourinary: Denies dysuria, frequency, urgency or hematuria  Musculoskeletal: Denies myalgias, muscle weakness, and bone pain  Neurological: Denies dizziness, vertigo, headache, and focal weakness  Psychological: Denies anxiety and depression  Endocrine: Denies heat intolerance and cold intolerance  Hematopoietic/Lymphatic: Denies bleeding problems and blood transfusions    24-hour events:      Objective   Vitals: /76   Pulse 92   Temp 98.7 °F (37.1 °C) (Oral)   Resp 18   Ht 5' 7\" (1.702 m)   Wt 172 lb 14.4 oz (78.4 kg)   SpO2 92%   BMI 27.08 kg/m²     I/O:    Intake/Output Summary (Last 24 hours) at 6/16/2019 1109  Last data filed at 6/16/2019 0759  Gross per 24 hour   Intake 460 ml   Output 2650 ml   Net -2190 ml       Vent Information  FiO2 : 40 %                CURRENT MEDS :  Scheduled Meds:   amoxicillin-clavulanate  1 tablet Oral 2 times per day    pantoprazole  40 mg Oral QAM AC    bisacodyl  10 mg Rectal Daily    memantine  10 mg Oral BID    donepezil  5 mg Oral BID    diazepam  5 mg Oral Daily    lidocaine   Topical Once    enoxaparin  40 mg Subcutaneous Daily    ipratropium-albuterol  1 ampule Inhalation Q4H WA       Physical Exam:  General Appearance: appears comfortable in no acute distress. HEENT: Normocephalic atraumatic without obvious abnormality   Neck: Lips, mucosa, and tongue normal.  Supple, symmetrical, trachea midline, no adenopathy;thyroid:  no enlargement/tenderness/nodules or JVD. Lung: clear to auscultation bilaterally. Symmetrical expansion. Heart: RRR, normal S1, S2. No MRG  Abdomen: Soft, NT, ND. BS present x 4 quadrants. No bruit or organomegaly. Extremities: Pedal pulses 2+ symmetric b/l. Extremities normal, no cyanosis, clubbing, or edema. Musculokeletal: No joint swelling, no muscle tenderness. ROM normal in all joints of extremities. Neurologic: Mental status: Alert and Oriented X3 .     Pertinent/ New Labs and Imaging Studies     Imaging Personally Reviewed:        Labs:  Lab Results   Component Value Date    WBC 7.1 06/15/2019    HGB 12.1 06/15/2019    HCT 34.7 06/15/2019    MCV 94.0 06/15/2019    MCH 32.8 06/15/2019    MCHC 34.9 06/15/2019    RDW 11.8 06/15/2019     06/15/2019    MPV 10.4 06/15/2019     Lab Results   Component Value Date     06/16/2019    K 4.1 06/16/2019    K 4.3 06/11/2019     06/16/2019    CO2 22 06/16/2019    BUN 19 06/16/2019    CREATININE 1.0 06/16/2019    LABALBU 2.7 06/15/2019    CALCIUM 8.4 06/16/2019    GFRAA >60 06/16/2019    LABGLOM >60 06/16/2019 No results found for: PROTIME, INR  No results for input(s): PROBNP in the last 72 hours. No results for input(s): PROCAL in the last 72 hours. This SmartLink has not been configured with any valid records. Micro:  resp panel negative  [place], strep urine antigen are neagtive     Assessment:    1. Aspiration pneumonia  2. Acute hypoxic resp failure secondary to #1- improving  3. Sepsis, lactic acidosis- improved  4. Pleural plaques      Plan:   1. Continue unasyn (#5/7), will switch to Augmentin. will need 2 more days of antibiotics orders are done  2. We did perform an ambulatory pulse oximetry and  patient needs 3 L of supplemental oxygen orders placed  3. Courage to continue incentive spirometry,   4. Continue Speech therapy   5. EDG results noted  6.   Patient will need follow up CT chest in 6 -8 weeks    Patient is stable to be discharged from a pulmonary standpoint    Electronically signed by Tamela Cardoso MD on 6/16/2019 at 11:09 AM

## 2019-06-16 NOTE — PLAN OF CARE
Problem: Falls - Risk of:  Goal: Will remain free from falls  Description  Will remain free from falls  Outcome: Met This Shift  Goal: Absence of physical injury  Description  Absence of physical injury  Outcome: Met This Shift     Problem: Pain:  Goal: Pain level will decrease  Description  Pain level will decrease     Outcome: Met This Shift

## 2019-06-17 NOTE — CARE COORDINATION
Received call from patient wife Fly uJares this morning stating she does not want home health care through the [address] Fly Juares is requesting for   to arrange home health care through [facility]. Referral made to [facility] at [address] [address] and she will confirm if they can accept patient. Electronically signed by LALA Toscano on 6/17/2019 at 9:47 AM    Addendum:  [facility] from [address] [place] [address] states they can accept patient and will initiate services tomorrow. Notified patient wife Fly Juares.   Electronically signed by LALA Toscano on 6/17/2019 at 9:58 AM

## 2019-06-18 NOTE — DISCHARGE SUMMARY
DISCHARGE SUMMARY  Patient ID:  Cm Ontiveros  [id number]  61 y.o.  1940    Admit date: 6/11/2019      Discharge date : 6/16/2019      Admission Diagnoses: Pneumonia [J18.9]  Pneumonia [J18.9]    Discharge Diagnosis  Principal Problem:    SBO (small bowel obstruction) ([facility]  Active Problems:    Vascular dementia without behavioral disturbance    History of stroke    Pneumonia    Lactic acidosis    Stage 3 chronic kidney disease (HCC)    Abdominal aortic aneurysm (AAA) 3.0 cm to 5.5 cm in diameter in male [facility])    Acute respiratory failure with hypoxia (HCC)    Aspiration pneumonia ([facility]    Sepsis ([facility])--present on admission    Hematemesis  Resolved Problems:    * No resolved hospital problems. *      Hospital Course: CHIEF COMPLAINT:  Abdominal pain        HISTORY OF PRESENT ILLNESS:       The patient is a 77 y.o. male patient of Dr. Kd Sheldon  presents with abdominal pain with vomiting and diarrhea. The patient is presently alert and aware of illness c/o mid right abdominal pain and diarrhea. He denies fever or rigors. Past history of pulmonary asbestosis. The patient severely hypotensive in the ER with 4 L IVF administered.       CT abdomen and pelvis:  IMPRESSIONS:   Small bowel obstruction versus ileus.   Consolidation within the bilateral lung bases may reflect infectious    Sepsis, present on admission      6/13/19-Subjective: The patient is alert. Feeling much better. No problems overnight. Denies chest pain. Intermittent SOB . Denies abdominal pain. Tolerating diet. No nausea or vomiting. Passing flatus.        6/14/2019-patient sitting in bedside chair; nasal cannula oxygen in place. States he feels much better; he did have bowel movement. No further hematemesis. Seen earlier today by surgery now scheduled for EGD in the a.m. due to the hematemesis. Pulmonary wants to have him wean from oxygen before discharge. Social service note from today appreciated.   Patient states he is never had any history of heart murmur; he has had strokes. Electronic record states murmur present; patient unaware of the above. He does not recall ever having 2D echo performed here or [place]. Nasal cannula 5 L 96% saturation. Temperature 98.1 respiration 16 pulse 93 blood pressure 90/56. WBC 7.9 hemoglobin 13.7 platelets 476. Sodium 138 potassium 3.9 chloride 103 CO2 24 BUN 17 creatinine 1.7 glucose 112 calcium 8.3.     6/15/2019-patient sitting in bedside chair just finished eating. Family present. Dyspnea improving. No chest pain. Patient underwent EGD this a.m.; GERD Brar's esophagitis and mild gastritis. Patient seen earlier today by pulmonary; that note is appreciated. Patient had pulmonary functions done by the [facility] in 2017 which were normal.  3 L nasal cannula 98% saturation. Temperature 96.8 respirations 18 pulse 81 blood pressure 128/71. Chest x-ray with following results--       Findings: There is a stable, large cardiomediastinal silhouette with  underlying central pulmonary vascular congestion with bibasilar  airspace disease and bilateral pleural effusions. No pneumothorax.       Impression:       1. Stable, enlarged cardiac mediastinal silhouette with underlying  central pulmonary vascular congestion. 2. Nonspecific bibasilar airspace disease, findings can be seen in  infiltrate/pneumonia and/or atelectasis. . Bilateral pleural effusions.      Sodium 136 potassium 3.7 chloride 105 CO2 22 BUN 19 creatinine 1.0 magnesium 1.8 glucose 127 calcium 7.8 phosphorus 3.2 protein 5.1 albumin 2.7 liver functions normal.  A1c 5.6 hemoglobin 12.1 WBC 7.1 platelets 548.     [id number] sitting in bedside chair states he feels much better. No chest pain little dyspnea while on oxygen. His eating has been fine no abdominal pains. Patient hoping for discharge today. Patient seen earlier today by pulmonary; they believe patient is stable enough for discharge on 3 L nasal cannula oxygen.   Note states they have made arrangements for same. Earlier today patient was 88% on room air at rest; 92% on 3 L at rest; 90% while ambulating with 3 L oxygen. Temperature 98.7 respirations 18 pulse 92 blood pressure 133/76. Still on nasal cannula oxygen, 4 L with 92% saturation. Sodium 141 potassium 4.1 chloride 108 CO2 22 BUN 19 creatinine 1.0 magnesium 1.9 glucose 120 calcium 8.4 phosphorus 4.1.           Instructions at discharge:  Continue aerosol treatments  Continue Unasyn until discharge then Augmentin  Continue weaning oxygen     6/16/2019  Med reconciliation completed  Prescriptions written home health care orders entered [place] versus other  3 L nasal cannula oxygen, per pulmonary  Augmentin 875 mg twice daily  Continue Namenda Aricept Valium  Follow-up Dr. Jina Austin 7 to 10 days  [facility]          Condition at DISCHARGE : Vivian 1878     Discharged to: Home with home health care    Discharge Instructions: Medications reviewed with patient    Consults:pulmonary/intensive care and general surgery     Past Medical Hx :   Past Medical History:   Diagnosis Date    Abdominal aortic aneurysm (AAA) 3.0 cm to 5.5 cm in diameter in [facility]) 6/14/2019    Acute respiratory failure with hypoxia ([address] 6/14/2019    Aspiration pneumonia ([address] 6/14/2019    CVA (cerebral vascular accident) ([address] 2007    Heart murmur     Hematemesis     Osteoarthritis     Prostate cancer ([address] 2007    Sepsis ([address] 6/14/2019    Present on admission    Stage 3 chronic kidney disease ([address] 6/14/2019       Past Surgical Hx :  Past Surgical History:   Procedure Laterality Date    CATARACT REMOVAL      HERNIA REPAIR      PROSTATE SURGERY      beacons/radiation    UPPER GASTROINTESTINAL ENDOSCOPY N/A 6/15/2019    EGD ESOPHAGOGASTRODUODENOSCOPY WITH BIOPSY performed by Nessa Rivera MD at [address]:   CBC:   Lab Results   Component Value Date    WBC 7.1 06/15/2019    RBC 3.69 06/15/2019    HGB 12.1 06/15/2019 HCT 34.7 06/15/2019    MCV 94.0 06/15/2019    MCH 32.8 06/15/2019    MCHC 34.9 06/15/2019    RDW 11.8 06/15/2019     06/15/2019    MPV 10.4 06/15/2019     CBC with Differential:    Lab Results   Component Value Date    WBC 7.1 06/15/2019    RBC 3.69 06/15/2019    HGB 12.1 06/15/2019    HCT 34.7 06/15/2019     06/15/2019    MCV 94.0 06/15/2019    MCH 32.8 06/15/2019    MCHC 34.9 06/15/2019    RDW 11.8 06/15/2019    LYMPHOPCT 20.2 06/15/2019    MONOPCT 9.0 06/15/2019    BASOPCT 0.3 06/15/2019    MONOSABS 0.64 06/15/2019    LYMPHSABS 1.44 06/15/2019    EOSABS 0.30 06/15/2019    BASOSABS 0.02 06/15/2019     Hemoglobin/Hematocrit:    Lab Results   Component Value Date    HGB 12.1 06/15/2019    HCT 34.7 06/15/2019     CMP:    Lab Results   Component Value Date     06/16/2019    K 4.1 06/16/2019    K 4.3 06/11/2019     06/16/2019    CO2 22 06/16/2019    BUN 19 06/16/2019    CREATININE 1.0 06/16/2019    GFRAA >60 06/16/2019    LABGLOM >60 06/16/2019    GLUCOSE 120 06/16/2019    PROT 5.1 06/15/2019    LABALBU 2.7 06/15/2019    CALCIUM 8.4 06/16/2019    BILITOT 0.5 06/15/2019    ALKPHOS 84 06/15/2019    AST 32 06/15/2019    ALT 27 06/15/2019     BMP:    Lab Results   Component Value Date     06/16/2019    K 4.1 06/16/2019    K 4.3 06/11/2019     06/16/2019    CO2 22 06/16/2019    BUN 19 06/16/2019    LABALBU 2.7 06/15/2019    CREATININE 1.0 06/16/2019    CALCIUM 8.4 06/16/2019    GFRAA >60 06/16/2019    LABGLOM >60 06/16/2019    GLUCOSE 120 06/16/2019     Hepatic Function Panel:    Lab Results   Component Value Date    ALKPHOS 84 06/15/2019    ALT 27 06/15/2019    AST 32 06/15/2019    PROT 5.1 06/15/2019    BILITOT 0.5 06/15/2019    BILIDIR <0.2 06/15/2019    IBILI see below 06/15/2019    LABALBU 2.7 06/15/2019     Magnesium:    Lab Results   Component Value Date    MG 1.9 06/16/2019     Phosphorus:    Lab Results   Component Value Date    PHOS 4.1 06/16/2019     LDH:  No results found for: LDH  Uric Acid:  No results found for: Randi Cuadra  PT/INR:  No results found for: PROTIME, INR  Warfarin PT/INR:  No components found for: PTPATWAR, PTINRWAR  PTT:  No results found for: APTT, PTT[APTT}  Troponin:    Lab Results   Component Value Date    TROPONINI <0.01 06/11/2019     Last 3 Troponin:    Lab Results   Component Value Date    TROPONINI <0.01 06/11/2019     U/A:    Lab Results   Component Value Date    COLORU Yellow 06/11/2019    PROTEINU 30 06/11/2019    PHUR 6.0 06/11/2019    WBCUA 5-10 06/11/2019    RBCUA 0-1 06/11/2019    BACTERIA MODERATE 06/11/2019    CLARITYU SL CLOUDY 06/11/2019    SPECGRAV 1.015 06/11/2019    LEUKOCYTESUR Negative 06/11/2019    UROBILINOGEN 0.2 06/11/2019    BILIRUBINUR Negative 06/11/2019    BLOODU TRACE-INTACT 06/11/2019    GLUCOSEU 100 06/11/2019     ABG:    Lab Results   Component Value Date    PH 7.336 06/11/2019    PCO2 38.2 06/11/2019    PO2 51.4 06/11/2019    HCO3 20.0 06/11/2019    BE -5.2 06/11/2019    O2SAT 83.7 06/11/2019     HgBA1c:    Lab Results   Component Value Date    LABA1C 5.6 06/15/2019     FLP:  No results found for: TRIG, HDL, LDLCALC, LDLDIRECT, LABVLDL  TSH:  No results found for: TSH  VITAMIN B12: No components found for: B12  FOLATE:    Lab Results   Component Value Date    FOLATE 12.5 06/15/2019          CBC:No results for input(s): WBC, RBC, HGB, HCT, PLT, MCV, MCH, MCHC, RDW, NRBC, SEGSPCT, BANDSPCT, BLASTSPCT, METASPCT, LYMPHOPCT, PROMYELOPCT, MONOPCT, MYELOPCT, EOSPCT, BASOPCT, MONOSABS, LYMPHSABS, EOSABS, BASOSABS, DIFFTYPE in the last 72 hours. Invalid input(s): ATYLMREL       H & H :No results for input(s): HGB in the last 72 hours. TSH:No results for input(s): TSH in the last 72 hours. GLUCOSE:No results for input(s): POCGLU in the last 72 hours.     CMP:  Recent Labs     06/16/19  0430      K 4.1   *   CO2 22   BUN 19   CREATININE 1.0   GFRAA >60   LABGLOM >60   GLUCOSE 120*   CALCIUM 8.4*         BNP:No results found for: BNP    PROTIME/INR:No results for input(s): PROTIME, INR in the last 72 hours. CRP: No results for input(s): CRP in the last 72 hours. ESR:No results for input(s): SEDRATE in the last 72 hours. LIPASE , AMYLASE:  Lab Results   Component Value Date    LIPASE 21 2019      No results found for: AMYLASE    ABGs: No results found for: PHART, PO2ART, QJF2XBV    CARDIAC: No results for input(s): CKTOTAL, CKMB, CKMBINDEX, TROPONINI in the last 72 hours. Lipid Profile: No results found for: TRIG, HDL, LDLCALC, CHOL     Ct Abdomen Pelvis Wo Contrast Additional Contrast? Oral    Result Date: 2019  Patient MRN:  [id number] : 1940 Age: 77 years Gender: Male Order Date:  2019 7:30 AM EXAM: CT ABDOMEN PELVIS WO CONTRAST number of images 384 Contrast. 50 mL of Omnipaque 240 orally Technique: Low-dose CT  acquisition technique included one of following options; 1 . Automated exposure control, 2. Adjustment of MA and or KV according to patient's size or 3. Use of iterative reconstruction. INDICATION:  eval pSBO  COMPARISON: 2019 FINDINGS: The lung bases demonstrate persistent  patchy bibasilar infiltrates and pleural effusions. There is mild cardiomegaly with coronary artery calcification and calcified pleural plaques bilaterally. The liver is of normal architecture. Contrast is identified in the gallbladder. The spleen, pancreas, the adrenals and the kidneys are normal. There is mild focal aneurysm of the abdominal aorta measuring 2.4 x 3.2 cm. There is severe degenerative changes in the lumbar spine. There is persistent moderately dilated proximal small bowel loops measuring up to 3.3 cm with a mild improvement in the gastric distention. The distal small bowel loops and colon are partially collapsed. Nonspecific lymph nodes are identified in the mesentery probably inflammatory. Pelvis. Bladder is collapsed with a Ralph catheter.  Radiation seeds are identified in the prostate gland. There is diverticulosis of colon without diverticulitis. There is constipation. The appendix is noted. Persistent small bowel dilatation with  moderately dilated proximal small bowel loops and collapsed distal small bowel loops and colon. There is mild improvement in gastric distention. Persistent infiltrates and pleural effusion in lung bases likely CHF or pneumonia. Mild abdominal aortic aneurysm. Xr Chest Standard (2 Vw)    Result Date: 6/15/2019  Patient MRN: [id number] : 1940 Age:  77 years Gender: Male Order Date: 6/15/2019 9:15 AM Exam: XR CHEST (2 VW) Number of Views: 2 Indication:   Hypoxia and pneumonia Comparison: Chest x-ray 2019. CT chest 2019 Findings: There is a stable, large cardiomediastinal silhouette with underlying central pulmonary vascular congestion with bibasilar airspace disease and bilateral pleural effusions. No pneumothorax. 1. Stable, enlarged cardiac mediastinal silhouette with underlying central pulmonary vascular congestion. 2. Nonspecific bibasilar airspace disease, findings can be seen in infiltrate/pneumonia and/or atelectasis. . Bilateral pleural effusions. Xr Abdomen (kub) (single Ap View)    Result Date: 2019  Patient MRN:  [id number] : 1940 Age: 77 years Gender: Male Order Date:  2019 4:45 AM EXAM: XR ABDOMEN (KUB) (SINGLE AP VIEW) NUMBER OF IMAGES:  1 view(s) INDICATION:  Dilated small bowel. Evaluate passage of contrast evaluate passage of contrast COMPARISON: 2019 also CT abdomen and pelvis 2019. FINDINGS: Nasogastric tube is in the stomach. There is a single dilated small bowel loop in the left abdomen measuring roughly 4 cm. The oral contrast bolus which was administered previously is within the lower GI tract indicating that there is no high-grade small bowel obstruction present. There is a small amount of dilute contrast within the stomach. There is no free air or bowel pneumatosis.  No abnormal calcifications are present. Oral contrast administered 1 day prior is within the lower GI tract. A small amount of contrast is within the stomach. These findings would suggest that there is no high-grade small bowel obstruction present. Xr Acute Abd Series Chest 1 Vw    Result Date: 2019  Patient MRN: [id number] : 1940 Age:  77 years Gender: Male Order Date: 2019 2:45 PM Exam: XR ACUTE ABD SERIES CHEST 1 VW Number of Images: 5 views Indication:   pain, r/o free air pain, r/o free air Comparison: None. Findings: The lungs demonstrate ill-defined pleural calcification more pronounced on the left as compared to the right. This is most pronounced in the left lung base also in the left lateral thorax. . There is no evidence of pulmonary infiltrate or pleural effusion. The pulmonary vascularity is unremarkable. The cardiac, hilar and mediastinal silhouettes are satisfactory. The bony thorax demonstrates no gross abnormality. Abdomen demonstrated the gas pattern to be nonspecific. There is no evidence of any free air there is osteoarthritic changes of the lumbar spine with mild levoscoliosis. Patient is status post brachytherapy with multiple seeds seen in the prostate. NO ACUTE CARDIOPULMONARY PROCESS No evidence of any free air Nonspecific gas pattern     Ct Abdomen Pelvis W Iv Contrast Additional Contrast? None    Result Date: 2019  This examination and all examinations utilizing ionizing radiation at this facility are done so according to the ALARA (as low as reasonably achievable) principal for radiation dose reduction. Axial, sagittal, and coronal computed tomography of the abdomen and pelvis was performed following intravenous administration of 110 mL of Isovue 3 7. Consolidation within the bilateral lung bases may reflect atelectasis although pneumonic infectious infiltrate should be excluded. There are calcified pleural plaques bilaterally. The heart is not enlarged.  There is tree reveals no intraluminal filling defect to suggest embolic phenomenon. There is no evidence of aortic dissection. Within the thoracic inlet, the thyroid gland is unremarkable. The major airways are patent. There is no mediastinal or hilar mass or lymphadenopathy. The heart is not enlarged. There is no evidence of pericardial fluid. Esophagus is fluid-filled throughout its course within the chest and into the stomach. Evaluation of the lung parenchyma reveals consolidation within the bilateral lower lobes posteriorly which could reflect atelectasis although infectious pneumonic infiltrate should be considered. There are calcified pleural plaques along the bilateral posterior costophrenic angles and the anterior upper lobes bilaterally. There is scarring of the apices. The lungs are otherwise negative for dominant mass or airspace consolidation. Within the upper abdomen, there is no evidence for acute or worrisome process. Skeletal structures are negative for evidence of aggressive process or acute fracture. Consolidation within the bilateral lower lobes posteriorly could reflect atelectasis although infectious pneumonic infiltrate should be considered. Fluid-filled esophagus throughout its course. No evidence for pulmonary embolism or aortic dissection. Xr Abdomen For Ng/og/ne Tube Placement    Result Date: 2019  Patient MRN: [id number] : 1940 Age:  77 years Gender: Male Order Date: 2019 7:00 PM Exam: XR ABDOMEN FOR NG/OG/NE TUBE PLACEMENT Number of Images: 2 views Indication:   Confirmation of course of NG/OG/NE tube and location of tip of tube Confirmation of course of NG/OG/NE tube and location of tip of tube Portable?-> Yes Comparison: None. Findings: Lower chest upper abdomen demonstrate nasogastric tube is in satisfactory position. There is barium seen in the transverse colon from recent barium study. The gas pattern is nonspecific.      The nasogastric tube appears to be in satisfactory position with the tip in the stomach. Fluoroscopy Modified Barium Swallow With Video    Result Date: 2019  Patient MRN: [id number] : 1940 Age:  77 years Gender: Male Order Date: 2019 12:00 PM Exam: [facility] Number of Images: 7 Indication:   R/O aspiration Comparison: None. Fluoroscopy time: 1 minute Findings: The patient is edentulous. Textures given, thin, nectar, pudding, cookie Aspiration, none Laryngeal penetration, trace penetration with thin seen on one occasion Cough, None Oral delay, None Retention in vallecula, yes, minimal Retention in piriform sinuses, yes, marked Pharyngeal delay, Yes Laryngeal elevation, adequate     Study is remarkable for trace laryngeal penetration with thin consistency, visualized on one occasion. This procedure was performed by Kia Taveras PA-C under the indirect supervision of Santi Lovelace MD. The exam has been dictated and signed by Kia Taveras PA-C. Kojo Ibrahim MD, Radiologist, have reviewed the images and report and concur with these findings.        Discharge Exam:  See progress note from today    Discharge Medication List as of 2019  2:56 PM      START taking these medications    Details   amoxicillin-clavulanate (AUGMENTIN) 875-125 MG per tablet Take 1 tablet by mouth every 12 hours for 10 days, Disp-20 tablet, R-0Normal         CONTINUE these medications which have NOT CHANGED    Details   Omega-3 Fatty Acids (FISH OIL) 1000 MG CAPS Take 1,000 mg by mouth every morningHistorical Med      Multiple Vitamins-Minerals (THERAPEUTIC MULTIVITAMIN-MINERALS) tablet Take 1 tablet by mouth every morningHistorical Med      vitamin E 400 UNIT capsule Take 400 Units by mouth every morning Historical Med      donepezil (ARICEPT) 10 MG tablet 1/2 tab twice daily, Disp-30 tablet, R-3Adjust Sig      memantine (NAMENDA) 10 MG tablet Take 1 tablet by mouth 2 times daily, Disp-180 tablet, R-2Normal      pantoprazole (PROTONIX) 40 MG tablet Take 40 mg by mouth every morning Historical Med      atorvastatin (LIPITOR) 40 MG tablet Take 40 mg by mouth every morning Historical Med      amLODIPine (NORVASC) 10 MG tablet Take 10 mg by mouth every morning Historical Med      vitamin B-12 (CYANOCOBALAMIN) 500 MCG tablet Take 500 mcg by mouth every morning Historical Med      aspirin 81 MG chewable tablet Take 1 tablet by mouth dailyOTC      diazepam (VALIUM) 5 MG tablet Take 5 mg by mouth every morning. Historical Med             Time Spent on discharge is more than 30 minutes --      TIME  INCLUDES TIME THAT WAS  SPENT WITH DISCHARGE PAPERS, MEDICATION REVIEW, MEDICATION RECONCILIATION,   PRESCRIPTIONS, CHART REVIEW, PATIENT EXAM , FINAL PROGRESS NOTE, DISCUSSION OF FINDINGS WITH PATIENT AND AVAILABLE FAMILY , AND DICTATION  WHERE NEEDED ; Home Health Care [facility]) FORMS COMPLETED ; N-17  COMPLETION ;  H&P UPDATED ; DURABLE EQUIPMENT FORMS.      Active Hospital Problems    Diagnosis    Stage 3 chronic kidney disease ([facility] [N18.3]    Abdominal aortic aneurysm (AAA) 3.0 cm to 5.5 cm in diameter in male [facility]) [I71.4]    Acute respiratory failure with hypoxia (HCC) [J96.01]    Aspiration pneumonia ([facility] [J69.0]    Sepsis ([facility] [A41.9]    Hematemesis [K92.0]    Pneumonia [J18.9]    SBO (small bowel obstruction) ([facility] [K56.609]    Lactic acidosis [E87.2]    Vascular dementia without behavioral disturbance [F01.50]    History of stroke [Z86.73]       Signed:    Shahla Tee DO    6/18/2019    2:41 PM    Voice recognition software use for dictation

## 2019-08-09 ENCOUNTER — HOSPITAL ENCOUNTER (OUTPATIENT)
Dept: CT IMAGING | Age: 79
Discharge: HOME | End: 2019-08-11
Payer: MEDICARE

## 2019-08-09 DIAGNOSIS — J96.21 ACUTE AND CHRONIC RESPIRATORY FAILURE WITH HYPOXIA (HCC): ICD-10-CM

## 2019-08-09 DIAGNOSIS — J69.0 ASPIRATION PNEUMONIA OF BOTH LOWER LOBES DUE TO REGURGITATED FOOD (HCC): ICD-10-CM

## 2019-08-09 PROCEDURE — 71250 CT THORAX DX C-: CPT

## 2019-08-19 ENCOUNTER — HOSPITAL ENCOUNTER (OUTPATIENT)
Dept: SLEEP CENTER | Age: 79
Discharge: HOME | End: 2019-08-19
Payer: MEDICARE

## 2019-08-19 DIAGNOSIS — G47.30 SLEEP APNEA, UNSPECIFIED TYPE: ICD-10-CM

## 2019-08-19 DIAGNOSIS — R06.83 SNORING: ICD-10-CM

## 2019-08-19 DIAGNOSIS — R06.81 APNEA: ICD-10-CM

## 2019-08-19 PROCEDURE — 95810 POLYSOM 6/> YRS 4/> PARAM: CPT

## 2019-08-19 ASSESSMENT — SLEEP AND FATIGUE QUESTIONNAIRES
HOW LIKELY ARE YOU TO NOD OFF OR FALL ASLEEP WHILE WATCHING TV: 1
HOW LIKELY ARE YOU TO NOD OFF OR FALL ASLEEP WHILE SITTING INACTIVE IN A PUBLIC PLACE: 0
HOW LIKELY ARE YOU TO NOD OFF OR FALL ASLEEP WHILE LYING DOWN TO REST IN THE AFTERNOON WHEN CIRCUMSTANCES PERMIT: 0
HOW LIKELY ARE YOU TO NOD OFF OR FALL ASLEEP WHILE SITTING AND READING: 0
HOW LIKELY ARE YOU TO NOD OFF OR FALL ASLEEP WHILE SITTING AND TALKING TO SOMEONE: 0
HOW LIKELY ARE YOU TO NOD OFF OR FALL ASLEEP WHEN YOU ARE A PASSENGER IN A CAR FOR AN HOUR WITHOUT A BREAK: 0
HOW LIKELY ARE YOU TO NOD OFF OR FALL ASLEEP IN A CAR, WHILE STOPPED FOR A FEW MINUTES IN TRAFFIC: 0
ESS TOTAL SCORE: 1
HOW LIKELY ARE YOU TO NOD OFF OR FALL ASLEEP WHILE SITTING QUIETLY AFTER LUNCH WITHOUT ALCOHOL: 0

## 2019-08-20 VITALS
BODY MASS INDEX: 26.32 KG/M2 | HEIGHT: 71 IN | OXYGEN SATURATION: 97 % | HEART RATE: 74 BPM | SYSTOLIC BLOOD PRESSURE: 128 MMHG | WEIGHT: 188 LBS | DIASTOLIC BLOOD PRESSURE: 73 MMHG

## 2019-09-13 ENCOUNTER — OFFICE VISIT (OUTPATIENT)
Dept: NEUROLOGY | Age: 79
End: 2019-09-13
Payer: MEDICARE

## 2019-09-13 VITALS
HEIGHT: 71 IN | TEMPERATURE: 97.6 F | WEIGHT: 182 LBS | HEART RATE: 64 BPM | SYSTOLIC BLOOD PRESSURE: 116 MMHG | BODY MASS INDEX: 25.48 KG/M2 | RESPIRATION RATE: 12 BRPM | OXYGEN SATURATION: 96 % | DIASTOLIC BLOOD PRESSURE: 73 MMHG

## 2019-09-13 DIAGNOSIS — Z86.73 HISTORY OF STROKE: ICD-10-CM

## 2019-09-13 DIAGNOSIS — F02.80 ALZHEIMER'S DEMENTIA WITHOUT BEHAVIORAL DISTURBANCE, UNSPECIFIED TIMING OF DEMENTIA ONSET: Primary | ICD-10-CM

## 2019-09-13 DIAGNOSIS — G62.9 PERIPHERAL POLYNEUROPATHY: ICD-10-CM

## 2019-09-13 DIAGNOSIS — G30.9 ALZHEIMER'S DEMENTIA WITHOUT BEHAVIORAL DISTURBANCE, UNSPECIFIED TIMING OF DEMENTIA ONSET: Primary | ICD-10-CM

## 2019-09-13 PROBLEM — J96.01 ACUTE RESPIRATORY FAILURE WITH HYPOXIA (HCC): Status: RESOLVED | Noted: 2019-06-14 | Resolved: 2019-09-13

## 2019-09-13 PROBLEM — A41.9 SEPSIS (HCC): Status: RESOLVED | Noted: 2019-06-14 | Resolved: 2019-09-13

## 2019-09-13 PROBLEM — J18.9 PNEUMONIA: Status: RESOLVED | Noted: 2019-06-11 | Resolved: 2019-09-13

## 2019-09-13 PROBLEM — J69.0 ASPIRATION PNEUMONIA (HCC): Status: RESOLVED | Noted: 2019-06-14 | Resolved: 2019-09-13

## 2019-09-13 PROBLEM — E87.20 LACTIC ACIDOSIS: Status: RESOLVED | Noted: 2019-06-11 | Resolved: 2019-09-13

## 2019-09-13 PROCEDURE — 1036F TOBACCO NON-USER: CPT | Performed by: NURSE PRACTITIONER

## 2019-09-13 PROCEDURE — G8419 CALC BMI OUT NRM PARAM NOF/U: HCPCS | Performed by: NURSE PRACTITIONER

## 2019-09-13 PROCEDURE — G8427 DOCREV CUR MEDS BY ELIG CLIN: HCPCS | Performed by: NURSE PRACTITIONER

## 2019-09-13 PROCEDURE — 1123F ACP DISCUSS/DSCN MKR DOCD: CPT | Performed by: NURSE PRACTITIONER

## 2019-09-13 PROCEDURE — 99214 OFFICE O/P EST MOD 30 MIN: CPT | Performed by: NURSE PRACTITIONER

## 2019-09-13 PROCEDURE — 4040F PNEUMOC VAC/ADMIN/RCVD: CPT | Performed by: NURSE PRACTITIONER

## 2019-09-13 NOTE — PATIENT INSTRUCTIONS
has a sudden change in his or her behavior.    Watch closely for changes in the person's health, and be sure to contact the doctor if:    · The person has symptoms that could cause injury.     · The person has problems with his or her medicine.     · You need more information to care for a person with dementia.     · You need respite care so you can take a break. Where can you learn more? Go to [email or web address] and sign in to your [employer] account. Enter E218 in the [place] box to learn more about \"Helping A Person With Dementia: Care Instructions. \"     If you do not have an account, please click on the \"Sign Up Now\" link. Current as of: September 11, 2018  Content Version: 12.1  © 5723-8969 [employer]wise, [employer]. Care instructions adapted under license by [facility] ([facility]). If you have questions about a medical condition or this instruction, always ask your healthcare professional. [place] [address] any warranty or liability for your use of this information.

## 2019-09-13 NOTE — PROGRESS NOTES
aphasias    Cranial Nerves:  I: smell    II: visual acuity     II: visual fields Full    II: pupils ARNOLD   III,VII: ptosis None     III,IV,VI: extraocular muscles  EOMI with no nystagmus   V: mastication Normal   V: facial light touch sensation  Normal   V,VII: corneal reflex     VII: facial muscle function - upper  Normal   VII: facial muscle function - lower Normal   VIII: hearing Decreased R---wears aids   IX: soft palate elevation  Normal   IX,X: gag reflex    XI: trapezius strength  5/5   XI: sternocleidomastoid strength 5/5   XI: neck extension strength  5/5   XII: tongue strength  Normal     Motor:  5/5 throughout  No drift  Spastic legs  No abnormal movements  No cogwheeling, bradykinesias, or resting tremors    Sensory:  LT normal b/l    Coordination:   FN and FFM intact b/l    Gait:  Slightly spastic, wide based, no shuffling or festination  Appropriate arm swinging  DTR:   Right Brachioradialis reflex 1+  Left Brachioradialis reflex 1+  Right Biceps reflex 1+  Left Biceps reflex 1+  Right Triceps reflex 1+  Left Triceps reflex 1+  Right Quadriceps reflex 1+  Left Quadriceps reflex 1+  Right Achilles reflex 0  Left Achilles reflex 0    No Rodriguez's  No grasps, suck, or other pathological reflexes    Laboratory/Radiology:  ry/Radiology:     Lab Results   Component Value Date     06/16/2019    K 4.1 06/16/2019     (H) 06/16/2019    CO2 22 06/16/2019    BUN 19 06/16/2019    CREATININE 1.0 06/16/2019    GLUCOSE 120 (H) 06/16/2019    CALCIUM 8.4 (L) 06/16/2019    PROT 5.1 (L) 06/15/2019    LABALBU 2.7 (L) 06/15/2019    BILITOT 0.5 06/15/2019    ALKPHOS 84 06/15/2019    AST 32 06/15/2019    ALT 27 06/15/2019    LABGLOM >60 06/16/2019    GFRAA >60 06/16/2019     Lab Results   Component Value Date    WBC 7.1 06/15/2019    HGB 12.1 (L) 06/15/2019    HCT 34.7 (L) 06/15/2019    MCV 94.0 06/15/2019     06/15/2019     EDX studies both legs:  diffuse, symmetrical sensorimotor peripheral neuropathy of

## 2019-09-14 PROBLEM — F02.80 ALZHEIMER'S DEMENTIA WITHOUT BEHAVIORAL DISTURBANCE (HCC): Status: ACTIVE | Noted: 2017-09-14

## 2019-09-14 PROBLEM — G30.9 ALZHEIMER'S DEMENTIA WITHOUT BEHAVIORAL DISTURBANCE (HCC): Status: ACTIVE | Noted: 2017-09-14

## 2019-09-23 ENCOUNTER — HOSPITAL ENCOUNTER (OUTPATIENT)
Dept: SLEEP CENTER | Age: 79
Discharge: HOME | End: 2019-09-23
Payer: MEDICARE

## 2019-09-23 DIAGNOSIS — G47.33 OSA (OBSTRUCTIVE SLEEP APNEA): ICD-10-CM

## 2019-09-23 PROCEDURE — 95811 POLYSOM 6/>YRS CPAP 4/> PARM: CPT

## 2019-09-24 VITALS
HEART RATE: 65 BPM | DIASTOLIC BLOOD PRESSURE: 71 MMHG | WEIGHT: 188 LBS | SYSTOLIC BLOOD PRESSURE: 136 MMHG | OXYGEN SATURATION: 89 % | HEIGHT: 71 IN | BODY MASS INDEX: 26.32 KG/M2

## 2019-10-02 NOTE — PROGRESS NOTES
[address] [address]                               SLEEP STUDY REPORT    PATIENT NAME: Chiquis Saini                  :        1940  MED REC NO:   [id number]                            ROOM:  ACCOUNT NO:   [de-identified]                           ADMIT DATE: 2019  PROVIDER:     Chepe Lees MD    DATE OF STUDY:  2019    INDICATION FOR STUDY:  The patient had known sleep apnea on a testing  done on  with an apnea-hypopnea index of 11.8 and lowest oxygen  saturation of 80%. PAST MEDICAL HISTORY:  Significant for asbestosis, arthritis, kidney  disease, stroke, acute respiratory failure with hypoxia. MEDICATIONS:  Include fish oil, multivitamin, vitamin E, B12, aspirin,  diazepam, pantoprazole, atorvastatin, amlodipine. The patient had a neck circumference of 15-1/2 inches, a BMI of 26.2  with an Henry score scale of 1/24. FINDINGS:  As follows:    SLEEP ARCHITECTURE:  Total recording time was 411.4 minutes. Total  sleep time was 348 minutes. Sleep efficacy was 84.6%. Sleep latency  was 8.1 minutes. SLEEP STAGES:  The patient was awake for 55.3 minutes. Spent 13.4% in  N1, 62.1% in N2, 0.7% in N3, and 23.9% in REM sleep. RESPIRATORY EVENTS:  During the study, the patient had 6 apneas, which  of those 2 were central, 3 were obstructive, and 1 was mixed. The  longest duration of the apneic event was 22.6 seconds. HYPOPNEAS:  During the study, the patient had 7 hypopneas. The longest  duration of the hypopneic event was 63.9 seconds. The apnea index was 1. The hypopnea index was 1.2. The apnea-hypopnea  index was 2.2. AROUSAL SUMMARY:  During the study, the patient had total of 33 arousals  and awakenings, which of those 26 were spontaneous and 7 were associated  with respiratory events.     OXYGEN SATURATIONS:  The patient's mean oxygen saturation while awake  was 92.3%.  The patient's mean oxygen saturation during steady sleep was  91.7%. The patient spent 56.9 minutes of the study with oxygen  saturations below 88% with the lowest oxygen saturation being 81%. HEART RATE SUMMARY:  The patient's average heart rate during steady  sleep was 71 beats per minute. The patient's average heart rate during  steady sleep was 65.4 beats per minute. The patient was not reported to  have any PACs or PVCs or arrhythmias. TITRATION STUDY:  The patient was started on a CPAP of 4, and pressures  were increased for elimination of events. On a CPAP of 9, the patient  did not have any respiratory events. With increasing pressure to 11,  the patient started developing central apneas and mixed apneas. The  lowest oxygen saturation was 88%. RECOMMENDATIONS:  1.  I would recommend a CPAP of 9 cm of water with a 20-minute ramp and  a humidifier. I would also recommend a medium Respironics DreamWear FFM  with medium head strap. 2.  The patient also had significant nocturnal desaturation. I would  recommend 2 liters of oxygen bleed through his CPAP. 3.  I would recommend to check an overnight pulse oximetry while the  patient is wearing his CPAP and also supplemental oxygen.         Arabella Nguyen MD      D: 10/01/2019 14:13:50       T: 10/01/2019 14:18:44     [id number]  Job#: [id number]     Doc#: [id number]    CC:

## 2019-10-22 PROBLEM — R06.83 SNORING: Status: ACTIVE | Noted: 2019-10-22

## 2019-10-22 PROBLEM — G47.30 SLEEP APNEA: Status: ACTIVE | Noted: 2019-10-22

## 2019-10-23 NOTE — ED NOTES
[facility]  [facility] 2399 Patient to Interventional Radiology per cart at this time for his procedure.

## 2020-06-22 ENCOUNTER — TELEPHONE (OUTPATIENT)
Dept: NEUROLOGY | Age: 80
End: 2020-06-22

## 2020-06-22 NOTE — TELEPHONE ENCOUNTER
Pt's wife, Pratik Beebe, called to update the office on pt's sx before his appt 6/24 as she is afraid to bring this info up in front of pt. Pratik Beebe states she thinks Valium needs increased bc over the past few weeks he has been more irritable, loses his temper quickly and lashes out at any moment. Pt has also been secretive and telling their children certain things or activities have happened which have not. Pt also has a slower thought process and is fatigue w/minimal activity. Pratik Beebe denies any hallucinations, recent med changes, or s/s of infection or UTI. Forwarding to LIZETH Byers, for informational purposes.   Electronically signed by Skip He on 6/22/20 at 1:37 PM EDT

## 2020-06-24 ENCOUNTER — OFFICE VISIT (OUTPATIENT)
Dept: NEUROLOGY | Age: 80
End: 2020-06-24
Payer: OTHER GOVERNMENT

## 2020-06-24 VITALS
HEIGHT: 71 IN | WEIGHT: 194 LBS | HEART RATE: 88 BPM | RESPIRATION RATE: 12 BRPM | SYSTOLIC BLOOD PRESSURE: 126 MMHG | DIASTOLIC BLOOD PRESSURE: 72 MMHG | TEMPERATURE: 98 F | BODY MASS INDEX: 27.16 KG/M2

## 2020-06-24 PROBLEM — R20.0 LEFT ARM NUMBNESS: Status: ACTIVE | Noted: 2020-06-24

## 2020-06-24 PROBLEM — K56.609 SBO (SMALL BOWEL OBSTRUCTION) (HCC): Status: RESOLVED | Noted: 2019-06-11 | Resolved: 2020-06-24

## 2020-06-24 PROCEDURE — 1123F ACP DISCUSS/DSCN MKR DOCD: CPT | Performed by: NURSE PRACTITIONER

## 2020-06-24 PROCEDURE — 99214 OFFICE O/P EST MOD 30 MIN: CPT | Performed by: NURSE PRACTITIONER

## 2020-06-24 PROCEDURE — 1036F TOBACCO NON-USER: CPT | Performed by: NURSE PRACTITIONER

## 2020-06-24 PROCEDURE — G8427 DOCREV CUR MEDS BY ELIG CLIN: HCPCS | Performed by: NURSE PRACTITIONER

## 2020-06-24 PROCEDURE — G8417 CALC BMI ABV UP PARAM F/U: HCPCS | Performed by: NURSE PRACTITIONER

## 2020-06-24 PROCEDURE — 4040F PNEUMOC VAC/ADMIN/RCVD: CPT | Performed by: NURSE PRACTITIONER

## 2020-06-24 RX ORDER — RIVASTIGMINE 4.6 MG/24H
1 PATCH, EXTENDED RELEASE TRANSDERMAL DAILY
Qty: 30 PATCH | Refills: 3 | Status: SHIPPED
Start: 2020-06-24 | End: 2021-02-18 | Stop reason: CLARIF

## 2020-06-24 RX ORDER — MEMANTINE HYDROCHLORIDE 10 MG/1
10 TABLET ORAL 2 TIMES DAILY
Qty: 180 TABLET | Refills: 2 | Status: SHIPPED
Start: 2020-06-24 | End: 2021-03-22

## 2020-06-24 SDOH — HEALTH STABILITY: MENTAL HEALTH: HOW OFTEN DO YOU HAVE A DRINK CONTAINING ALCOHOL?: NOT ASKED

## 2020-06-24 NOTE — PROGRESS NOTES
[address] [facility]. Lukasz Costello M.D., F.A.C.P. Megan Farmer, DNP, APRN, CNS  [facility]. Javier Johns, MSN, APRN-FNP-C  Tyshawn Wright MSN, APRN, FNP-C  NIKUNJ Phillips [address] MSN, APRN, FNP-C  [address] [place] [address]  [place], [address]  Phone: [phone]  Fax: [phone]       Barbara Taylor is a [de-identified] y.o. right handed man    We are following him for Alzheimer's dementia and mild peripheral neuropathy    He presents with his wife today--he is a a fair historian and his wife is a good one    His wife called me a few days ago to notify me that the patient has been more agitated, anxious, and argumentative at home since his last visit. She was asking if his Valium could be increased. He vehemently denies any changes in his behavior, mood, or memory. His wife states his short-term memory issues are stable, but unchanged. He remains resistant to allowing her to help with medication supervision and continues to desire maintenance of his independence. He remains on  Namenda 10 mg BID and Aricept 5 mg BID. Balance issues and numbness/tingling in both legs have worsened, but thankfully he has suffered no falls. No leg weakness or low back pain. He also feels tingling sensations in his left hand up to above his elbow. No arm weakness or neck pain. He and his wife are both emotional during the interview because their youngest son was recently diagnosed with ALS.     No chest pain or palpitations  No SOB  No incontinence of bowels or bladder  No itching or bruising appreciated  No speech or swallowing troubles  No focal limb weakness    ROS otherwise negative     Current Outpatient Medications   Medication Sig Dispense Refill    Omega-3 Fatty Acids (FISH OIL) 1000 MG CAPS Take 1,000 mg by mouth every morning      Multiple Vitamins-Minerals (THERAPEUTIC MULTIVITAMIN-MINERALS) tablet Take 1 tablet by mouth every morning      vitamin E 400 UNIT

## 2020-06-24 NOTE — PATIENT INSTRUCTIONS
up.  · Stay active. Exercise such as walking may slow the decline of your mental abilities. Try to stay active mentally too. Read and work crossword puzzles if you enjoy these activities. · If you have trouble sleeping, do not nap during the day. Get regular exercise (but not within several hours of bedtime). Drink a glass of warm milk or caffeine-free herbal tea before going to bed. · Ask your doctor about support groups and other resources in your area. They can help people who have Alzheimer's disease and their families. · Be patient. You may find that a task takes you longer than it used to. · If you have not already done so, make a list of advance directives. Advance directives are instructions to your doctor and family members about what kind of care you want if you become unable to speak or express yourself. Talk to a  about making a will, if you do not already have one. Keeping schedules  · Develop a routine. You will feel less frustrated or confused if you have a clear, simple plan of what to do every day. ? Make lists of your medicines and when to take them. ? Write down appointments and other tasks in a calendar. ? Put sticky notes around the house to help you remember events and other things you have to do. ? Schedule activities and tasks for times of the day when you are best able to handle them. Staying safe  · Tell someone when you are going out and where you are going. Let the person know when you will be back. Before you go out alone, write down where you are going, how to get there, and how to get back home. Do this even if you have gone there many times before. Take someone along with you when possible. · Make your home safe. Tack down rugs, put no-slip tape in the tub, use handrails, and put safety switches on stoves and appliances. · Have a family member or other caregiver tell you whether you are driving badly. Deciding to stop driving is very hard for many people.  Driving on while bathing, showering, or swimming. Remove the skin patch after 24 hours and replace it with a new one. Always remove an old patch before putting on a new one. Do not wear more than one rivastigmine patch at a time. Using extra skin patches will not make the medication more effective. If a patch falls off, apply a new patch and wear it for the rest of the day. Change the patch at your usual time the next day. Choose a different place on your body to wear the patch each time you put on a new one. Do not use the same skin area twice within 14 days. Always wash your hands after removing the patch. After removing a patch, fold it in half so it sticks together and throw it away in a place where children or pets cannot get to it. A used skin patch could be fatal to a child or pet who accidentally chews on the patch. Seek emergency medical attention if this happens. It may take up to 4 weeks before your symptoms improve. For best results, keep using the patches as directed. If you need surgery, tell your surgeon you currently use this medicine. If you stop using rivastigmine for any reason, do not restart the medicine without talking to your doctor first. Malika Dotson may need to start with a lower dose. Store at room temperature away from moisture and heat. Keep each patch in its foil pouch until you are ready to use it. What happens if I miss a dose? If you forget to change the skin patch, remove it and apply a new one as soon as you remember. Do not wear extra patches to make up a missed dose. Call your doctor for instructions if you miss more than 3 doses of rivastigmine. What happens if I overdose? Seek emergency medical attention or call the Poison Help line at [phone]. What should I avoid while using rivastigmine transdermal?  Avoid placing the patch where it will be rubbed by tight clothing. Avoid touching your eyes after handling a skin patch.   Do not use lotion, oil, or powder on the skin

## 2020-07-24 ENCOUNTER — TELEPHONE (OUTPATIENT)
Dept: NEUROLOGY | Age: 80
End: 2020-07-24

## 2020-07-24 ENCOUNTER — HOSPITAL ENCOUNTER (OUTPATIENT)
Dept: SPEECH THERAPY | Age: 80
Setting detail: THERAPIES SERIES
Discharge: HOME | End: 2020-07-24
Payer: MEDICARE

## 2020-07-24 PROCEDURE — 96125 COGNITIVE TEST BY HC PRO: CPT | Performed by: SPEECH-LANGUAGE PATHOLOGIST

## 2020-07-24 NOTE — PROGRESS NOTES
NOTE:  Although cognitive therapy is recommended at this time, both Jadon Juiceas and Itzel Mcginins stated that they would like to wait until they review the results of this test with both of his physicians. They were instructed to contact this therapist when they are ready to schedule and begin therapy. They agreed with this plan. [] Cognitive therapy is not warranted at this time. [x] Cognitive therapy is recommended 1x/week for 6-10 weeks with emphasis on the following:    [x] To improve immediate memory    [x] To improve recent memory    [] To improve temporal orientation (recent memory)    [] To improve temporal orientation (remote memory)    [] To improve spatial orientation    [] To improve orientation to environment    [] To improve recall of general information    [] To improve reasoning    [] To improve problem solving    [x] To improve thought organization    [] To improve auditory processing and retention    XX To improve reading comprehension     Patient stated goals: To remember things a little better  Treatment goals discussed with [x] patient [x] family. [x] Patient [x] family understands the diagnosis, prognosis and plan of care. This plan will be re-evaluated and revised as warranted. Prognosis for improvements in the above area(s) is [] good [x] fair-dependent upon medical condition  [] guarded [] unknown at this time. EDUCATION:     Speech language pathologist (SLP) completed education with the patient regarding identified cognitive deficits and subsequent need for speech pathology intervention. Discussed deficit areas to be targeted by formal intervention and established short/long term goals. Reviewed compensatory strategies to improve functional outcome (as appropriate). Encouraged patient to engage SLP in structured Q&A session relative to identified deficit areas. Patient indicated understanding of all information provided via satisfactory verbal response.     [x]Fall risk assessment completed  [x] The admitting diagnosis and active problem list as listed below have been reviewed prior to the initiation of this evaluation. Cognitive communication deficit [R41.841]     Patient Active Problem List   Diagnosis    Alzheimer's dementia without behavioral disturbance ([facility]    History of stroke    Peripheral polyneuropathy    Stage 3 chronic kidney disease ([facility]    Abdominal aortic aneurysm (AAA) 3.0 cm to 5.5 cm in diameter in male [facility])    Sleep apnea    Snoring    Left arm numbness     Angelo Aase. YUKI Watson/CCC-SLP  [id number]  CPT 15988    I CERTIFY THAT THIS EVALUATION AND PLAN OF CARE FOR SPEECH THERAPY SERVICES ARE APPROPRIATE AND MEDICALLY NECESSARY.     DURATION:  FROM:______07/24/2020_________________THRU________10/23/2020________________              ________________________________________________________________________  Diane Jolanta                                                                         DATE

## 2020-08-02 ENCOUNTER — HOSPITAL ENCOUNTER (OUTPATIENT)
Dept: CT IMAGING | Age: 80
Discharge: HOME | End: 2020-08-04
Payer: OTHER GOVERNMENT

## 2020-08-02 PROCEDURE — 6360000004 HC RX CONTRAST MEDICATION: Performed by: RADIOLOGY

## 2020-08-02 PROCEDURE — 70498 CT ANGIOGRAPHY NECK: CPT

## 2020-08-02 PROCEDURE — 70496 CT ANGIOGRAPHY HEAD: CPT

## 2020-08-02 RX ADMIN — IOPAMIDOL 100 ML: 755 INJECTION, SOLUTION INTRAVENOUS at 13:25

## 2020-08-12 ENCOUNTER — TELEPHONE (OUTPATIENT)
Dept: VASCULAR SURGERY | Age: 80
End: 2020-08-12

## 2020-08-21 ENCOUNTER — HOSPITAL ENCOUNTER (OUTPATIENT)
Dept: NEUROLOGY | Age: 80
Discharge: HOME | End: 2020-08-21
Payer: OTHER GOVERNMENT

## 2020-08-21 VITALS — HEIGHT: 71 IN | WEIGHT: 193 LBS | BODY MASS INDEX: 27.02 KG/M2

## 2020-08-21 PROCEDURE — 95913 NRV CNDJ TEST 13/> STUDIES: CPT | Performed by: PSYCHIATRY & NEUROLOGY

## 2020-08-21 PROCEDURE — 95913 NRV CNDJ TEST 13/> STUDIES: CPT

## 2020-08-21 PROCEDURE — 95886 MUSC TEST DONE W/N TEST COMP: CPT | Performed by: PSYCHIATRY & NEUROLOGY

## 2020-08-21 PROCEDURE — 95886 MUSC TEST DONE W/N TEST COMP: CPT

## 2020-08-21 NOTE — PROCEDURES
Kym  22.   Electrodiagnostic Laboratory  [facility]        Full Name: Phebe Pilar. Precious Rubinstein Gender: Male  MRN: [id number] YOB: 1940  Location[de-identified] [facility] (55)      Visit Date: 8/21/2020 12:13  Age: [de-identified] Years 5 Months Old  Examining Physician: Dr. Salina Alexander   Referring Physician: LIZETH Blanco-CNP  Technician: Denisa Zheng   Height: 5 feet 11 inch  Weight: 193 lbs  Notes: Peripheral polyneuropathy; BLE & LUE    BMI:  26.92        Motor NCS      Nerve / Sites Lat. Amplitude Distance Lat Diff Velocity Temp. Amp. 1-2    ms mV cm ms m/s °C %   L Median - APB      Wrist 7.81 5.1 8   32 100      Elbow 13.18 5.0 24 5.36 45 32 99.2   L Ulnar - ADM      Wrist 2.66 7.3 8   32 100      B. Elbow 6.82 6.9 21 4.17 50 32 94.2      A. Elbow 9.27 6.9 10 2.45 41 32 93.6   R Peroneal - EDB      Ankle 4.79 0.5 8   31.2 100      Pop fossa 13.07 0.5 39 8.28 47 31.2 111   L Peroneal - EDB      Ankle 5.42 0.4 8   31.5 100      Pop fossa 14.11 0.3 39 8.70 45 31.5 63   R Tibial - AH      Ankle 4.90 0.7 8   31.6 100      Pop fossa 18.49 0.2 41 13.59 30 31.6 26.2   L Tibial - AH      Ankle 3.70 6.1 8   31.4 100      Pop fossa 15.47 5.2 41 11.77 35 31.4 85.9       Sensory NCS      Nerve / Sites Onset Lat Peak Lat PP Amp Distance Velocity Temp.    ms ms µV cm m/s °C   L Median - Digit II (Antidromic)      Mid Palm 2.86 3.96 11.3 7 24 32      Wrist 5.99 7.14 18.9 14 23 32   L Ulnar - Digit V (Antidromic)      Wrist 2.60 3.44 38.9 14 54 32   L Radial - Anatomical snuff box (Forearm)      Forearm 1.46 1.88 19.8 10 69 32   R Superficial peroneal - Ankle      Lat leg NR NR NR 10 NR 31.4   L Superficial peroneal - Ankle      Lat leg NR NR NR 10 NR 31.5   R Sural - Ankle (Calf)      Calf NR NR NR 14 NR 31.6   L Sural - Ankle (Calf)      Calf NR NR NR 14 NR 31.6       F  Wave      Nerve F Lat M Lat F-M Lat    ms ms ms   R Peroneal - EDB 56.1 4.9 51.2   R Tibial - AH NR NR NR   L Tibial - AH 60.9 3.6 57.3   L Peroneal - EDB 58.2 5.5 52.8   L Median - APB 37.6 7.7 29.9   L Ulnar - ADM 29.6 2.5 27.1       H Reflex      Nerve Lat Hmax    ms   R Tibial - Soleus NR   L Tibial - Soleus NR       EMG         EMG Summary Table     Spontaneous MUAP Recruitment   Muscle IA Fib PSW Fasc H.F. Amp Dur. PPP Pattern   L. Cervical paraspinals (mid) Sl Incr None 1+ None None N N N N   L. Cervical paraspinals (low) Inc/DNT None None None None N N N N   L. Deltoid N None None None None N N N N   L. Triceps brachii N None None None None N 1+ 1+ N   L. Biceps brachii N None None None None N N N N   L. Brachioradialis N None None None None N N N N   L. Extensor digitorum communis N None None None None N 2+ 1+ N   L. Flexor pollicis longus N None None None None N 1+ 1+ N   L. Flexor digitorum profundus, dig 2 & 3 N None None None None N 1+ 1+ N   L. Extensor indicis proprius N None None None None N 2+ 1+ N   L. Abductor pollicis brevis N None None None None N N 1+ Sl Decr   L. First dorsal interosseous N None None None None N N N N   L. Sacral paraspinals Incr 1+ 1+ None None N N N N   L. Lumbar paraspinals (low) Incr 1+ 1+ None None N N N N   L. Gluteus medius N None None None None N 2+ 1+ N   L. Biceps femoris (short head) N None None None None N 2+ 1+ N   L. Vastus lateralis N None None None None N N N N   L. Gastrocnemius (Medial head) Inc/DNT None None None None N 2+ 1+ N   L. Tibialis anterior N None None None None N 2+ 2+ Sl Decr   L. Flexor digitorum longus N None None None None N 1+ 2+ Sl Decr w/rapid firing   L. Extensor hallucis longus N None None None None N N 1+ Decr w/rapid firing   L. Dorsal interossei (pedis) N None None None None N 2+ N Distant MUPs   L.  Extensor digitorum brevis N None None None None N N N Distant MUPs         Nerve conduction studies in the left arm revealed the following abnormalities--- marked prolongation of the distal motor latency of the left median nerve at the wrist.  The left median palmar and distal sensory latencies were also markedly delayed, with decreased antidromic and orthodromic sensory nerve conduction velocities. The F-wave latency of the left median nerve was also delayed. Nerve testings in both legs displayed the following--- absent superficial peroneal and sural sensory nerve potentials. The compound motor action potentials in both peroneal and the left tibial nerves were decreased, with slow bilateral tibial nerves motor conduction velocities. The right tibial F wave response was not recorded; the F-wave latencies of the left tibial and peroneal nerves were prolonged. Both H reflexes were absent. These findings were compared to the referential values in this laboratory, available upon request.    Monopolar needle examination of the left arm found chronic axonal loss with with reinnervation in the muscle supplied by the C7 motor root, as well as in the abductor pollicis brevis muscle. Acute denervation changes were seen in the cervical paraspinals. Needle testing of the left leg produced chronic denervation changes in the muscle supplied by the L5 and S1 motor roots. The paraspinals displayed acute denervation changes. Electrodiagnostic examination of the left arm and both legs disclosed evidence diagnostic of the following---    1. A left median neuropathy at/or distal to the wrist, of marked severity. These findings were consistent with carpal tunnel syndrome. 2.  A left C7 motor radiculopathy or intracanalicular lesion, proven by the abnormal needle testing of the paraspinals. 3.  Left L5 and S1 motor radiculopathies or intracanalicular lesions, again proven by the abnormal needle testing of the paraspinals. There were no other peripheral neuropathies. Absent sensory neuropathies in both feet are commonly seen in the elderly. There were no other motor radiculopathies or intracanalicular lesions.   Sensory radiculopathies cannot be evaluated by electrodiagnostic means.    The patient's previous electrodiagnostic studies from 16 months ago suggested peripheral nerve neuropathic disease of minimal severity. There was no evidence at that time of motor radiculopathies. Clinically, the patient presented with sensory loss in both feet and calves, in addition to right hand numbness and neck pains. His difficulties are the result of his right carpal tunnel syndrome, as well as cervical and lumbosacral radicular disease. Imaging studies of his cervical and lumbosacral spine were recommended. Clinical correlation was highly advised.

## 2020-08-25 ENCOUNTER — TELEPHONE (OUTPATIENT)
Dept: NEUROLOGY | Age: 80
End: 2020-08-25

## 2020-08-25 NOTE — TELEPHONE ENCOUNTER
----- Message from LIZETH Quinn CNP sent at 8/24/2020  8:26 AM EDT -----  Regarding: EMG  EMG of the left arm and both legs show that he has a significant left carpal tunnel syndrome--as well as evidence of possible nerve damage coming from his neck and affecting his left arm. He also has evidence of LS radiculopathy in his L5 and S1 regions    Recommend imaging of his cervical and lumbar spines.

## 2020-08-25 NOTE — TELEPHONE ENCOUNTER
MA informed pt's wife, Kevin Brown, of EMG results and recommendations per Tyree Jason. Kevin Brown understood and will discuss options with pt and contact MA afterwards with pt decision.   Electronically signed by Ke Williamson on 8/25/20 at 8:37 AM EDT

## 2020-08-28 ENCOUNTER — TELEPHONE (OUTPATIENT)
Dept: NEUROLOGY | Age: 80
End: 2020-08-28

## 2020-08-28 NOTE — TELEPHONE ENCOUNTER
Authorization previously obtained through [place] through 9/28/20. Pt scheduled for MRI cervical/lumbar at [address] [place] [address] on 9/15 at 1:15pm but is to arrive at 12:30pm. Pt advised to enter through the main entrance, bring a photo ID, insurance card, list of current medications, not to wear any jewelry and avoid clothing with metal fragments (like yoga pants), buttons or zippers. Pt and wife verbalized understanding of all instructions.   Electronically signed by Reyna Stanton on 8/28/20 at 12:50 PM EDT

## 2020-09-09 ENCOUNTER — TELEPHONE (OUTPATIENT)
Dept: NEUROLOGY | Age: 80
End: 2020-09-09

## 2020-09-09 NOTE — TELEPHONE ENCOUNTER
Dr Татьяна Bueno Vascular surgeon called. He asked to talk to thr provider taking care of this patient. Blossom Hancock way this week. He wanted her opinion on carotid issues. If Blossom Hancock thought he needed surgery. Adán Kim looked at chart and recent office visits No mention. MA called Dr Татьяна Bueno back. To let him know.  Ma let know that PCP ordered the CTA in August.   Electronically signed by Benito Heath MA on 9/9/20 at 12:48 PM EDT

## 2020-09-15 ENCOUNTER — HOSPITAL ENCOUNTER (OUTPATIENT)
Dept: MRI IMAGING | Age: 80
Discharge: HOME | End: 2020-09-17
Payer: OTHER GOVERNMENT

## 2020-09-15 PROCEDURE — 72141 MRI NECK SPINE W/O DYE: CPT

## 2020-09-15 PROCEDURE — 72148 MRI LUMBAR SPINE W/O DYE: CPT

## 2020-09-16 ENCOUNTER — TELEPHONE (OUTPATIENT)
Dept: NEUROLOGY | Age: 80
End: 2020-09-16

## 2020-09-16 NOTE — TELEPHONE ENCOUNTER
----- Message from LIZETH Hodges CNP sent at 9/16/2020  9:22 AM EDT -----  Regarding: MRIs cervical and lumbar  MRI lumbar spine showed extensive arthritis and narrowing around his L3-L4 and L4-L5, with some possible pressing on the nerve roots. He also has some mild narrowing in a few regions of his cervical spine, but this is not severe. There is some significant narrowing in the regions where the nerves exit his spinal cord at multiple levels in his neck. All of these findings are contributing to his walking issues. He needs PT/OT and conservative therapy first with pain mgmt if they are agreeable.  He may need to see a neurosurgeon if these fail.      ----- Message -----  From: Donna Santizo Incoming Radiant Results From [facility]  Sent: 9/15/2020   3:52 PM EDT  To: LIZETH Hodges CNP

## 2021-02-18 ENCOUNTER — OFFICE VISIT (OUTPATIENT)
Dept: NEUROLOGY | Age: 81
End: 2021-02-18
Payer: MEDICARE

## 2021-02-18 VITALS
SYSTOLIC BLOOD PRESSURE: 118 MMHG | HEART RATE: 96 BPM | WEIGHT: 193 LBS | HEIGHT: 71 IN | TEMPERATURE: 97.9 F | BODY MASS INDEX: 27.02 KG/M2 | RESPIRATION RATE: 12 BRPM | DIASTOLIC BLOOD PRESSURE: 64 MMHG

## 2021-02-18 DIAGNOSIS — G30.9 ALZHEIMER'S DEMENTIA WITH BEHAVIORAL DISTURBANCE, UNSPECIFIED TIMING OF DEMENTIA ONSET: Primary | ICD-10-CM

## 2021-02-18 DIAGNOSIS — F02.81 ALZHEIMER'S DEMENTIA WITH BEHAVIORAL DISTURBANCE, UNSPECIFIED TIMING OF DEMENTIA ONSET: Primary | ICD-10-CM

## 2021-02-18 DIAGNOSIS — G56.02 CARPAL TUNNEL SYNDROME OF LEFT WRIST: ICD-10-CM

## 2021-02-18 DIAGNOSIS — M54.17 L-S RADICULOPATHY: ICD-10-CM

## 2021-02-18 DIAGNOSIS — M54.12 CERVICAL RADICULOPATHY: ICD-10-CM

## 2021-02-18 PROBLEM — R06.83 SNORING: Status: RESOLVED | Noted: 2019-10-22 | Resolved: 2021-02-18

## 2021-02-18 PROBLEM — R20.0 LEFT ARM NUMBNESS: Status: RESOLVED | Noted: 2020-06-24 | Resolved: 2021-02-18

## 2021-02-18 PROCEDURE — 99214 OFFICE O/P EST MOD 30 MIN: CPT | Performed by: NURSE PRACTITIONER

## 2021-02-18 PROCEDURE — G8484 FLU IMMUNIZE NO ADMIN: HCPCS | Performed by: NURSE PRACTITIONER

## 2021-02-18 PROCEDURE — G8417 CALC BMI ABV UP PARAM F/U: HCPCS | Performed by: NURSE PRACTITIONER

## 2021-02-18 PROCEDURE — 1036F TOBACCO NON-USER: CPT | Performed by: NURSE PRACTITIONER

## 2021-02-18 PROCEDURE — 1123F ACP DISCUSS/DSCN MKR DOCD: CPT | Performed by: NURSE PRACTITIONER

## 2021-02-18 PROCEDURE — 4040F PNEUMOC VAC/ADMIN/RCVD: CPT | Performed by: NURSE PRACTITIONER

## 2021-02-18 PROCEDURE — G8427 DOCREV CUR MEDS BY ELIG CLIN: HCPCS | Performed by: NURSE PRACTITIONER

## 2021-02-18 RX ORDER — RIVASTIGMINE TARTRATE 1.5 MG/1
1.5 CAPSULE ORAL 2 TIMES DAILY
Qty: 60 CAPSULE | Refills: 3 | Status: SHIPPED
Start: 2021-02-18 | End: 2021-07-02

## 2021-02-18 NOTE — PROGRESS NOTES
[address] [facility]. Billy Colbert M.D., F.A.C.P. Eugune Gowers, ANTHONY, APRN, CNS  Lidia Benson. Ange Molina, MSN, APRN-FNP-C  Kari Clement MSN, APRN, FNP-C  CALLY Albarran-C  [address] MSN, APRN, FNP-C  [address] [place], [address] [address]  Phone: [phone]  Fax: [phone]       Katina Osler is a 79 y.o. 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 He feels he would do better if the Exelon was switched to pill form. He does remember to take his memantine. His wife states the patient's become even more resistant to allow her to help and they continue to suffer with a great degree of stress and over their son, who continues to decline from ALS. No physical or verbal aggression    EMG showed evidence of cervical radiculopathy, severe, left carpal tunnel. MRI of cervical spine showed stenosis at C3-C4 and lumbar spine showed stenosis. He is complaining of numbness and tingling in his right lower leg from his knee to his foot. He initially declined pain management and physical therapy referrals, and does not want to take any more medication, but is now agreeable to the referrals. Thankfully he has suffered no falls, but he is very stiff and slow when walking. He has been suffering with some dependent edema in both legs.     No chest pain or palpitations  No SOB  No incontinence of bowels or bladder  No itching or bruising appreciated  No speech or swallowing troubles  No focal limb weakness    ROS otherwise negative     Current Outpatient Medications   Medication Sig Dispense Refill    memantine (NAMENDA) 10 MG tablet Take 1 tablet by mouth 2 times daily 180 tablet 2    rivastigmine (EXELON) 4.6 MG/24HR Place 1 patch onto the skin daily 30 patch 3    Omega-3 Fatty Acids (FISH OIL) 1000 MG CAPS Take 1,000 mg by mouth every morning      Multiple Vitamins-Minerals (THERAPEUTIC MULTIVITAMIN-MINERALS) tablet Take 1 tablet by mouth every morning      vitamin E 400 UNIT capsule Take 400 Units by mouth every morning       pantoprazole (PROTONIX) 40 MG tablet Take 40 mg by mouth every morning       atorvastatin (LIPITOR) 40 MG tablet Take 40 mg by mouth every morning       amLODIPine (NORVASC) 10 MG tablet Take 10 mg by mouth every morning       vitamin B-12 (CYANOCOBALAMIN) 500 MCG tablet Take 500 mcg by mouth every morning       aspirin 81 MG chewable tablet Take 1 tablet by mouth daily (Patient taking differently: Take 81 mg by mouth every morning )      diazepam (VALIUM) 5 MG tablet Take 5 mg by mouth every morning. No current facility-administered medications for this visit.       Objective:     /64 (Site: Right Upper Arm, Position: Sitting, Cuff Size: Medium Adult)   Pulse 96   Temp 97.9 °F (36.6 °C) (Infrared)   Resp 12   Ht 5' 11\" (1.803 m)   Wt 193 lb (87.5 kg)   BMI 26.92 kg/m²     General appearance: alert, appears stated age, cooperative and in no distress  Head: normocephalic/atraumatic  Eyes: sclerae, conjunctivae/corneas clear no drainage  Neck: no carotid bruits; limited ROM with no cogwheeling  Lungs: clear to auscultation bilaterally; resps nonlabored  Heart: regular rate and rhythm---no murmur  Extremities: 1+ edema BLE  Pulses: 2+ and symmetric  Skin: no abrasions or rashes; dry skin BLE    Mental Status: alert and oriented x 4---appears depressed today    Appropriate attention/concentration  Evidence of mild short-term memory issues  Able to perform simple and complex commands well    Speech: no dysarthria  Language: no aphasias    Cranial Nerves:  I: smell    II: visual acuity     II: visual fields Full    II: pupils ARNOLD   III,VII: ptosis None     III,IV,VI: extraocular muscles  EOMI with no nystagmus   V: mastication Normal   V: facial light touch sensation  Normal   V,VII: corneal reflex     VII: facial muscle function - upper  Normal   VII: facial muscle function - lower Normal   VIII: hearing Decreased R---wears aids   IX: soft palate elevation  Normal   IX,X: gag reflex    XI: trapezius strength  5/5   XI: sternocleidomastoid strength 5/5   XI: neck extension strength  5/5   XII: tongue strength  Normal     Motor:  5/5 throughout  No drift  Spastic legs; normal bulk  No abnormal movements  No cogwheeling, bradykinesias, or resting tremors    Sensory:  LT normal b/l  Vibration impaired at both ankles    Coordination:   FN and FFM intact b/l    Gait:  Wide-based, spastic, steppage gait  No shuffling or festination  Appropriate arm swinging    DTR:   Right Brachioradialis reflex 1+  Left Brachioradialis reflex 1+  Right Biceps reflex 1+  Left Biceps reflex 1+  Right Triceps reflex 1+  Left Triceps reflex 1+  Right Quadriceps reflex 1+  Left Quadriceps reflex 1+  Right Achilles reflex 0  Left Achilles reflex 0    No Rodriguez's  No grasps, suck, or other pathological reflexes    Laboratory/Radiology:  ry/Radiology:     EMG August 2020: Electrodiagnostic examination of the left arm and both legs disclosed evidence diagnostic of the following---1. A left median neuropathy at/or distal to the wrist, of marked severity. These findings were consistent with carpal tunnel syndrome. 2. A left C7 motor radiculopathy or intracanalicular lesion, proven by the abnormal needle testing of the paraspinals. 3. Left L5 and S1 motor radiculopathies or intracanalicular lesions, again proven by the abnormal needle testing of the paraspinals. There were no other peripheral neuropathies. Absent sensory neuropathies in both feet are commonly seen in the elderly. There were no other motor radiculopathies or intracanalicular lesions.  Sensory radiculopathies cannot be evaluated by electrodiagnostic means. MRI cervical spine Sept 2020: Central spinal canal stenosis is present at C3-4 and C6-7. Neural foraminal canal stenosis is present bilaterally at C3-4, C4-5, C5-6, C6-7 and C7-T1. MRI lumbar spine Sept 2020: Extensive degenerative and scoliotic change of the lumbar spine is associated with crowding of roots of the cauda equina at the L3 and 4 root levels, and probable encroachment on exiting nerve roots at the left L4 and L5 root levels. There is no evidence of fracture or paraspinous soft tissue pathology. Fusiform ectasia of the infrarenal abdominal aorta is minimal and technically not aneurysmal in caliber (3 cm maximum diameter). All labs and images personally reviewed today    Assessment:     Suspect mild Alzheimer's dementia: with some behavioral issues and belligerence. Exam has been stable on dual memory agents.     Cervical stenosis with radiculopathy: causing spastic paraparetic gait and pain    LS radiculopathy: crowding of cauda equina on MRI--may benefit from lumbar ARIANNE for radicular symptoms as he is declining additional medications    Severe L CTS    AAA    Hx remote occipital stroke     Plan:     [facility]    Refer to Dr. Nicanor Atwood to discuss lumbar ARIANNE    Switch to Exelon tabs 1.5 mg BID; continue Namenda 10 mg BID    Consider MELCHOR wetzel--pt and wife declining at this time    Wife to monitor medication administration closely    RTO in 6 months or sooner DAMEONN    LIZETH Jasso--CNP  12:51 PM  2/18/2021

## 2021-02-18 NOTE — PATIENT INSTRUCTIONS
Patient Education        Preventing Falls: Care Instructions  Your Care Instructions     Getting around your home safely can be a challenge if you have injuries or health problems that make it easy for you to fall. Loose rugs and furniture in walkways are among the dangers for many older people who have problems walking or who have poor eyesight. People who have conditions such as arthritis, osteoporosis, or dementia also have to be careful not to fall. You can make your home safer with a few simple measures. Follow-up care is a key part of your treatment and safety. Be sure to make and go to all appointments, and call your doctor if you are having problems. It's also a good idea to know your test results and keep a list of the medicines you take. How can you care for yourself at home? Taking care of yourself  · You may get dizzy if you do not drink enough water. To prevent dehydration, drink plenty of fluids, enough so that your urine is light yellow or clear like water. Choose water and other caffeine-free clear liquids. If you have kidney, heart, or liver disease and have to limit fluids, talk with your doctor before you increase the amount of fluids you drink. · Exercise regularly to improve your strength, muscle tone, and balance. Walk if you can. Swimming may be a good choice if you cannot walk easily. · Have your vision and hearing checked each year or any time you notice a change. If you have trouble seeing and hearing, you might not be able to avoid objects and could lose your balance. · Know the side effects of the medicines you take. Ask your doctor or pharmacist whether the medicines you take can affect your balance. Sleeping pills or sedatives can affect your balance. · Limit the amount of alcohol you drink. Alcohol can impair your balance and other senses. · Ask your doctor whether calluses or corns on your feet need to be removed.  If you wear loose-fitting shoes because of calluses or corns, you can lose your balance and fall. · Talk to your doctor if you have numbness in your feet. Preventing falls at home  · Remove raised doorway thresholds, throw rugs, and clutter. Repair loose carpet or raised areas in the floor. · Move furniture and electrical cords to keep them out of walking paths. · Use nonskid floor wax, and wipe up spills right away, especially on ceramic tile floors. · If you use a walker or cane, put rubber tips on it. If you use crutches, clean the bottoms of them regularly with an abrasive pad, such as steel wool. · Keep your house well lit, especially Catheline Ligas, and outside walkways. Use night-lights in areas such as hallways and bathrooms. Add extra light switches or use remote switches (such as switches that go on or off when you clap your hands) to make it easier to turn lights on if you have to get up during the night. · Install sturdy handrails on stairways. · Move items in your cabinets so that the things you use a lot are on the lower shelves (about waist level). · Keep a cordless phone and a flashlight with new batteries by your bed. If possible, put a phone in each of the main rooms of your house, or carry a cell phone in case you fall and cannot reach a phone. Or, you can wear a device around your neck or wrist. You push a button that sends a signal for help. · Wear low-heeled shoes that fit well and give your feet good support. Use footwear with nonskid soles. Check the heels and soles of your shoes for wear. Repair or replace worn heels or soles. · Do not wear socks without shoes on wood floors. · Walk on the grass when the sidewalks are slippery. If you live in an area that gets snow and ice in the winter, sprinkle salt on slippery steps and sidewalks. Preventing falls in the bath  · Install grab bars and nonskid mats inside and outside your shower or tub and near the toilet and sinks. · Use shower chairs and bath benches.   · Use a hand-held shower head that will allow you to sit while showering. · Get into a tub or shower by putting the weaker leg in first. Get out of a tub or shower with your strong side first.  · Repair loose toilet seats and consider installing a raised toilet seat to make getting on and off the toilet easier. · Keep your bathroom door unlocked while you are in the shower. Where can you learn more? Go to https://[employer]. org and sign in to your [employer] account. Enter [phone] in the [employer] box to learn more about \"Preventing Falls: Care Instructions. \"     If you do not have an account, please click on the \"Sign Up Now\" link. Current as of: April 15, 2020               Content Version: 12.6  © 7663-4486 [employer]. Care instructions adapted under license by [facility] ([facility]). If you have questions about a medical condition or this instruction, always ask your healthcare professional. [address] any warranty or liability for your use of this information.

## 2021-02-19 PROBLEM — M54.12 CERVICAL RADICULOPATHY: Status: ACTIVE | Noted: 2021-02-19

## 2021-02-19 PROBLEM — G56.02 CARPAL TUNNEL SYNDROME OF LEFT WRIST: Status: ACTIVE | Noted: 2021-02-19

## 2021-02-19 PROBLEM — M54.17 L-S RADICULOPATHY: Status: ACTIVE | Noted: 2021-02-19

## 2021-03-01 ENCOUNTER — PREP FOR PROCEDURE (OUTPATIENT)
Dept: PAIN MANAGEMENT | Age: 81
End: 2021-03-01

## 2021-03-01 ENCOUNTER — OFFICE VISIT (OUTPATIENT)
Dept: PAIN MANAGEMENT | Age: 81
End: 2021-03-01
Payer: COMMERCIAL

## 2021-03-01 VITALS
WEIGHT: 193 LBS | OXYGEN SATURATION: 97 % | SYSTOLIC BLOOD PRESSURE: 128 MMHG | BODY MASS INDEX: 27.02 KG/M2 | TEMPERATURE: 97.6 F | HEIGHT: 71 IN | DIASTOLIC BLOOD PRESSURE: 70 MMHG | RESPIRATION RATE: 16 BRPM | HEART RATE: 73 BPM

## 2021-03-01 DIAGNOSIS — M54.12 CERVICAL RADICULOPATHY: ICD-10-CM

## 2021-03-01 DIAGNOSIS — M48.061 SPINAL STENOSIS OF LUMBAR REGION, UNSPECIFIED WHETHER NEUROGENIC CLAUDICATION PRESENT: ICD-10-CM

## 2021-03-01 DIAGNOSIS — M51.36 DDD (DEGENERATIVE DISC DISEASE), LUMBAR: Primary | ICD-10-CM

## 2021-03-01 DIAGNOSIS — M47.817 LUMBOSACRAL SPONDYLOSIS WITHOUT MYELOPATHY: ICD-10-CM

## 2021-03-01 DIAGNOSIS — M43.02 CERVICAL SPONDYLOLYSIS: ICD-10-CM

## 2021-03-01 DIAGNOSIS — M50.30 DDD (DEGENERATIVE DISC DISEASE), CERVICAL: ICD-10-CM

## 2021-03-01 PROBLEM — M51.369 DDD (DEGENERATIVE DISC DISEASE), LUMBAR: Status: ACTIVE | Noted: 2021-03-01

## 2021-03-01 PROCEDURE — 4040F PNEUMOC VAC/ADMIN/RCVD: CPT | Performed by: ANESTHESIOLOGY

## 2021-03-01 PROCEDURE — 1123F ACP DISCUSS/DSCN MKR DOCD: CPT | Performed by: ANESTHESIOLOGY

## 2021-03-01 PROCEDURE — 99204 OFFICE O/P NEW MOD 45 MIN: CPT | Performed by: ANESTHESIOLOGY

## 2021-03-01 PROCEDURE — 1036F TOBACCO NON-USER: CPT | Performed by: ANESTHESIOLOGY

## 2021-03-01 PROCEDURE — G8427 DOCREV CUR MEDS BY ELIG CLIN: HCPCS | Performed by: ANESTHESIOLOGY

## 2021-03-01 PROCEDURE — G8484 FLU IMMUNIZE NO ADMIN: HCPCS | Performed by: ANESTHESIOLOGY

## 2021-03-01 PROCEDURE — G8417 CALC BMI ABV UP PARAM F/U: HCPCS | Performed by: ANESTHESIOLOGY

## 2021-03-01 NOTE — PROGRESS NOTES
Patient:  Gage Reynolds, JUMA 1940  Date of Service:  3/1/21      Do you currently have any of the following:    Fever: No  Headache:  No  Cough: No  Shortness of breath: No  Exposed to anyone with these symptoms: No       Patient presents with complaints of both leg and left shoulder pain that started 6 months ago and has been getting worse. He states the pain began following No specific cause    Pain is constant and is described as burning and numb. He rates the pain as a 8/10 on his worst day , 8/10 on his best day, and a 8/10 on average on the VAS scale. Pain does radiate to shoulder pain goes into his arm. He  has numbness of the left arm. Alleviating factors include: OTC. Aggravating factors include:  nothing. He states that the pain does not keep him from sleeping at night. He took his last dose of Tylenol yesterday. He is not on NSAIDS and  is not on anticoagulation medications to include none and is managed by . Previous treatments: Physical Therapy, Surgery and medications. .      Personal Expectations from this treatment: decrease the pain    /70   Pulse 73   Temp 97.6 °F (36.4 °C) (Infrared)   Resp 16   Ht 5' 11\" (1.803 m)   Wt 193 lb (87.5 kg)   SpO2 97%   BMI 26.92 kg/m²     No LMP for male patient.

## 2021-03-01 NOTE — PROGRESS NOTES
[facility]        [address] [facility], [address] [facility]  Dept: [phone]          Consult Note      Patient:  Susana Cuevas,  1940    Date of Service:  21     Requesting Physician:  Leopold Bile, APRN -*    Reason for Consult:      Patient presents with complaints of low back and neck pain    HISTORY OF PRESENT ILLNESS:      Mr. Susana Cuevas is a 79 y.o. male presented today to [address] [facility] for evaluation of  chronic low back and b/l LE pain. H/o dementia- with his wife for today's visit. Low back pain and b/l LE pain / tingling/ numbness +    Neck pain and left upper extremity pain - tingling and numbness +    Has been  evaluated by Neurology- recommended interventions. MRI of LS spine and MRI fo C spine - reviewed. Prior Lumbar spine surgery + years ago    Pain is constant and is described as aching and throbbing. Patient does not have bladder or bowel dysfunction. Alleviating factors include: rest.  Aggravating factors include: movement, lifting. Pain causes functional limitations/ limits Adl's : Yes    Nursing notes and details of the pain history reviewed. Please see intake notes for details. Previous treatments:   Physical Therapy/ HEP : yes     Medications: - Yes    Surgeries: yes, Lumbar spine surgery years ago    Interventional Pain procedures/ nerve blocks: no    He has been on anticoagulation medications yes,  and include ASA. He has not been on herbal supplements. He is not diabetic. TESTS:    EMG: EMG 2020: Electrodiagnostic examination of the left arm and both legs disclosed evidence diagnostic of the following---1.  A left median neuropathy at/or distal to the wrist, of marked severity.  These findings were consistent with carpal tunnel syndrome. 2.  A left C7 motor radiculopathy or intracanalicular lesion, proven by the abnormal needle testing of the paraspinals. 3.  Left L5 and S1 motor radiculopathies or intracanalicular lesions, again proven by the abnormal needle testing of the paraspinals. There were no other peripheral neuropathies.  Absent sensory neuropathies in both feet are commonly seen in the elderly.  There were no other motor radiculopathies or intracanalicular lesions. Sensory radiculopathies cannot be evaluated by electrodiagnostic means. Imaging:     MRI of the lumbar spine 9/15/2020:  Findings:    Sagittal images document loss of lumbar lordosis and slight anterior   wedging of the T11 vertebrae, which appears new since June 2019, but   which is not associated with increased STIR signal to suggest acute   marrow edema/trauma. Modic endplate changes are noted throughout the   lumbar region, and disc spacing and signal are decreased at all   levels. There is no STIR signal to suggest acute marrow edema from   trauma or adjacent paraspinous traumatic change. The conus medullaris   is localized to the lower L1 level, in the distribution of roots in   the cauda equina is uniform, with apparent severe crowding at the L4-5   level due to central canal stenosis. Foraminal stenosis on the right   is noted at the L2-4 levels, and at the L3 and 4 root levels on the   left.       Coronal images document irregular contours of the infrarenal abdominal   aorta, but the maximum diameter is only 3 cm. No paraspinous visceral   pathology is noted. There is a right lateral flexion of the lumbar   spine at the at the L3-4 level, with posterior central canal   transverse diameter narrowing at the L3 and 4 levels by degenerative   change.  Sacroiliac joints are unremarkable.       Axial images best depict patency of the central canal and foramina as   follows:   T12-L1: Unremarkable   L1-L2: Unremarkable   L2-L3: Unremarkable, with prominent facet hypertrophy on the right   L3-L4: There is mild circumferential central spinal stenosis with   disc/osteophyte changes likely encroaching on the exiting L3 nerve   root on the left, lateral to the central canal. The posterior canal is   narrowed transversely by hypertrophy ligamentum flavum, which crowds   roots in the descending cauda equina   L4-L5: A transverse disc bulge extends laterally to the left, and may   abut the exiting L4 nerve root lateral to the foramen. L5-S1: There is stenosis of the left L5 nerve root foramen and   disc/osteophyte changes extend laterally to the left, likely   encroaching on the L5 nerve root.       Extensor musculature in the lower lumbar spine shows extensive fatty   atrophy.           Impression   Extensive degenerative and scoliotic change of the lumbar spine is   associated with crowding of roots of the cauda equina at the L3 and 4   root levels, and probable encroachment on exiting nerve roots at the   left L4 and L5 root levels.       There is no evidence of fracture or paraspinous soft tissue pathology.       Fusiform ectasia of the infrarenal abdominal aorta is minimal and   technically not aneurysmal in caliber (3 cm maximum diameter). MRI of the cervical spine on 9/15/2020:     FINDINGS:       These images reveal no evidence for acute displaced cortical   disruption or spondylolisthesis. There is straightening of lordosis. There is diffuse disc dehydration and loss of disc height. Diffuse   degenerative spondylosis and facet arthropathy are present. The signal   intensity of the cord is normal. The cervical medullary junction is   unremarkable.       C2-3: No evidence for central or neuroforaminal canal stenosis or   neural impingement.       C3-4: Mild central spinal canal stenosis to 8.6 mm secondary to   posterior disc and osteophyte complex which flattens the ventral   surface of the cord. Mild stenosis of bilateral neuroforamen due to   uncovertebral hypertrophy and degenerative facet arthropathy.       C4-5: No central canal stenosis.  Mild right and moderate left   foraminal stenosis due to uncovertebral hypertrophy and degenerative   facet arthropathy.       C5-6: No central canal stenosis. Mild bilateral foraminal stenosis due   to uncovertebral hypertrophy and degenerative facet arthropathy.       C6-7: Central spinal canal stenosis to 8.5 mm secondary to posterior   disc and osteophyte complex. Severe stenosis of bilateral neuroforamen   due to uncovertebral hypertrophy and degenerative facet arthropathy.       C7-T1: No central canal stenosis. Moderate bilateral foraminal   stenosis due to uncovertebral hypertrophy and degenerative facet   arthropathy.           Impression       Central spinal canal stenosis is present at C3-4 and C6-7.       Neural foraminal canal stenosis is present bilaterally at C3-4, C4-5,   C5-6, C6-7 and C7-T1. Past Medical History:   Diagnosis Date    Abdominal aortic aneurysm (AAA) 3.0 cm to 5.5 cm in diameter in male [facility]) 6/14/2019    Acute respiratory failure with hypoxia ([address] 6/14/2019    Aspiration pneumonia ([address] 6/14/2019    CVA (cerebral vascular accident) ([address] 2007    Dementia ([address]     Depression     Heart murmur     Hematemesis     Osteoarthritis     Prostate cancer ([address] 2007    Sepsis ([address] 6/14/2019    Present on admission    Stage 3 chronic kidney disease 6/14/2019       Past Surgical History:   Procedure Laterality Date    CATARACT REMOVAL      HERNIA REPAIR      PROSTATE SURGERY      beacons/radiation    UPPER GASTROINTESTINAL ENDOSCOPY N/A 6/15/2019    EGD ESOPHAGOGASTRODUODENOSCOPY WITH BIOPSY performed by Billy Rodriguez MD at [address] [facility] [address]       Prior to Admission medications    Medication Sig Start Date End Date Taking?  Authorizing Provider   rivastigmine (EXELON) 1.5 MG capsule Take 1 capsule by mouth 2 times daily 2/18/21  Yes LIZETH Cervantes CNP   memantine [facility]) 10 MG tablet Take 1 tablet by mouth 2 times daily 6/24/20  Yes LIZETH Cervantes CNP   Omega-3 Fatty Acids (FISH OIL) 1000 MG CAPS Take 1,000 mg by mouth every morning   Yes Historical Provider, MD   Multiple Vitamins-Minerals (THERAPEUTIC MULTIVITAMIN-MINERALS) tablet Take 1 tablet by mouth every morning   Yes Historical Provider, MD   vitamin E 400 UNIT capsule Take 400 Units by mouth every morning  09  Yes Historical Provider, MD   pantoprazole (PROTONIX) 40 MG tablet Take 40 mg by mouth every morning  3/12/18  Yes Historical Provider, MD   atorvastatin (LIPITOR) 40 MG tablet Take 40 mg by mouth every morning    Yes Historical Provider, MD   amLODIPine (NORVASC) 10 MG tablet Take 10 mg by mouth every morning    Yes Historical Provider, MD   vitamin B-12 (CYANOCOBALAMIN) 500 MCG tablet Take 500 mcg by mouth every morning    Yes Historical Provider, MD   aspirin 81 MG chewable tablet Take 1 tablet by mouth daily  Patient taking differently: Take 81 mg by mouth every morning  17  Yes LIZETH Rodriguez CNP   diazepam (VALIUM) 5 MG tablet Take 5 mg by mouth every morning.     Yes Historical Provider, MD       Allergies   Allergen Reactions    Oxycodone-Acetaminophen Nausea And Vomiting     Other reaction(s): GI Upset    Vicodin [Hydrocodone-Acetaminophen] Nausea And Vomiting       Social History     Socioeconomic History    Marital status:      Spouse name: Not on file    Number of children: Not on file    Years of education: Not on file    Highest education level: Not on file   Occupational History    Occupation: [employer]   Social Needs    Financial resource strain: Not on file    Food insecurity     Worry: Not on file     Inability: Not on file   [employer] needs     Medical: Not on file     Non-medical: Not on file   Tobacco Use    Smoking status: Former Smoker     Packs/day: 1.00     Years: 34.00     Pack years: 34.00     Types: Cigarettes     Start date: 1959     Quit date: 1987     Years since quittin.8    Smokeless tobacco: Never Used   Substance and Sexual Activity    Alcohol use: Not Currently     Comment: 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    Drug use: Never    Sexual activity: Not Currently     Partners: Female   Lifestyle    Physical activity     Days per week: Not on file     Minutes per session: Not on file    Stress: Not on file   Relationships    Social connections     Talks on phone: Not on file     Gets together: Not on file     Attends [employer] service: Not on file     Active member of club or organization: Not on file     Attends meetings of clubs or organizations: Not on file     Relationship status: Not on file    Intimate partner violence     Fear of current or ex partner: Not on file     Emotionally abused: Not on file     Physically abused: Not on file     Forced sexual activity: Not on file   Other Topics Concern    Not on file   Social History Narrative    Not on file       Family History   Problem Relation Age of Onset    Heart Disease Father     Crohn's Disease Sister        REVIEW OF SYSTEMS:     Patient specifically denies fever/chills, chest pain, shortness of breath, new bowel or bladder complaints. All other review of systems was negative. Review of Systems - Documented reviewed    PHYSICAL EXAMINATION:      /70   Pulse 73   Temp 97.6 °F (36.4 °C) (Infrared)   Resp 16   Ht 5' 11\" (1.803 m)   Wt 193 lb (87.5 kg)   SpO2 97%   BMI 26.92 kg/m²     General:      General appearance:  Pleasant and well-hydrated, in no distress and A & O x 3  Build:Normal Weight  Function: Rises from seated position easily and Moves about room without difficulty    HEENT:    Head:normocephalic, atraumatic  Pupils:regular, round, equal  Sclera: icterus absent    Lungs:    Breathing:normal breathing pattern   CVS:     RRR    Abdomen:    Shape:non-distended and normal    Cervical spine:    Inspection:normal  Palpation:tenderness paravertebral muscles, tenderness trapezium, left, right and positive. Range of motion:reduced flexion, extension, rotation bilaterally and is painful.   Spurling's: negative bilaterally    Thoracic spine:     Spine inspection:normal   Palpation:No tenderness over the midline and paraspinal area, bilaterally  Range of motion:normal in flexion, extension rotation bilateral and is not painful. Lumbar spine:    Spine inspection: Scar from the prior surgery + healed well  Palpation: Tenderness paravertebral muscles mild   Range of motion: Decreased, flexion Decreased, Lateral bending, extension and rotation bilaterally reduced is not painful. Sacroiliac joint tenderness No bilaterally  Piriformis tenderness: negative bilaterally  SLR : negative bilaterally  Trochanteric bursa tenderness: negative bilaterally  CVA tenderness:No     Musculoskeletal:    Trigger points no    Extremities:    B/l LE edema +    Neurological:    Sensory: Normal to light touch     Motor:   Right  5/5              Left  5/5               Right Bicep 5/5           Left Bicep 5/5              Right Triceps 5/5       Left Triceps 5/5          Right Deltoid 5/5     Left Deltoid 5/5                  Right Quadriceps 5/5          Left Quadriceps 5/5           Right Gastrocnemius 5/5    Left Gastrocnemius 5/5  Right Ant Tibialis 5/5  Left Ant Tibialis 5/5    Reflexes:    B/l Equal    Gait:normal Yes    Dermatology:    Skin:no rashes or lesions noted    Assessment/Plan:     Diagnosis Orders   1. DDD (degenerative disc disease), lumbar     2. Lumbosacral spondylosis without myelopathy     3. Spinal stenosis of lumbar region, unspecified whether neurogenic claudication present     4. DDD (degenerative disc disease), cervical     5. Cervical radiculopathy     6. Cervical spondylolysis         79 y.o. male with H/o low back and B/l LE pain. H/o Prior lumbar spine surgery years ago. Also has chronic Neck pain and left UE pain. Failed conservative treatment. MRI fo LS spine and Mri C spine reviewed. On ASA -81 mg    H/o Dementia +    Plan:    Right L4 and left L5 TFESI under fluoroscopic guidance. RBA discussed with patient and family and they agreed. Hold ASA-81 mg for the procedure    Consider Cervical ARIANNE in future. Has B/l LE swelling- recommend f/u with PCP. Dementia- f/u with neurology. Urine screen today: no    Counseling :    Patient encouraged to stay active and to watch/lose weight    Encouraged to continue Regular home exercise program as tolerated - stretching / strengthening. Treatment plan discussed with the patient including medication and procedure side effects. Controlled Substances Monitoring:   [place]ARRS reviewed; William Trujillo MD    Dear Ms. Luciano Veraver you for referring Mr. Rosa Romero and allowing us to participate in his care. Please do not hesitate to contact me if you have any questions regarding his care.     Zuly Deluna MD    CC:    Ann Sky, LIZETH - CNP  [address] [facility] [address]  [address] [facility]     Dash Das MD  [address] [facility] [facility]     Dr. Caitlin Schroeder  Fax: [phone]

## 2021-03-02 ENCOUNTER — TELEPHONE (OUTPATIENT)
Dept: PAIN MANAGEMENT | Age: 81
End: 2021-03-02

## 2021-03-03 NOTE — PROGRESS NOTES
[facility]  INSTRUCTIONS  . .......................................................................................................................................... [x] Parking the day of Surgery is located in the [facility]. Upon entering the door, someone will be there to greet you    [x]  Bring photo ID and insurance card     [x] You may have a light breakfast day of procedure    [x]  Wear loose comfortable clothing    [x]  Please follow instructions for medications as given per Dr's office     [x] Stop blood thinners as per Dr's office instructions    [x] You can expect a call the business day prior to procedure to notify you of your arrival time         Have you been tested for COVID  Yes (11/2020)         Have you been told you were positive for COVID Yes (11/2020)  Have you had any known exposure to someone that is positive for COVID No  Do you have a cough                   No              Do you have shortness of breath No                 Do you have a sore throat            No                Are you having chills                    No                Are you having muscle aches. No                    Please come to the hospital wearing a mask and have your significant other wear a mask as well. Both of you should check your temperature before leaving to come here,  if it is 100 or higher please call [phone] for instruction. [x] Please arrange for     []  Other instructions

## 2021-03-04 ENCOUNTER — TELEPHONE (OUTPATIENT)
Dept: PAIN MANAGEMENT | Age: 81
End: 2021-03-04

## 2021-03-08 ENCOUNTER — HOSPITAL ENCOUNTER (OUTPATIENT)
Age: 81
Setting detail: OUTPATIENT SURGERY
Discharge: HOME | End: 2021-03-08
Attending: ANESTHESIOLOGY | Admitting: ANESTHESIOLOGY
Payer: OTHER GOVERNMENT

## 2021-03-08 ENCOUNTER — HOSPITAL ENCOUNTER (OUTPATIENT)
Dept: GENERAL RADIOLOGY | Age: 81
Setting detail: OUTPATIENT SURGERY
Discharge: HOME | End: 2021-03-10
Attending: ANESTHESIOLOGY
Payer: OTHER GOVERNMENT

## 2021-03-08 VITALS
HEART RATE: 74 BPM | WEIGHT: 193 LBS | DIASTOLIC BLOOD PRESSURE: 62 MMHG | OXYGEN SATURATION: 97 % | SYSTOLIC BLOOD PRESSURE: 114 MMHG | RESPIRATION RATE: 16 BRPM | TEMPERATURE: 97 F | HEIGHT: 71 IN | BODY MASS INDEX: 27.02 KG/M2

## 2021-03-08 DIAGNOSIS — R52 PAIN MANAGEMENT: ICD-10-CM

## 2021-03-08 PROCEDURE — 64483 NJX AA&/STRD TFRM EPI L/S 1: CPT | Performed by: ANESTHESIOLOGY

## 2021-03-08 PROCEDURE — 3600000002 HC SURGERY LEVEL 2 BASE: Performed by: ANESTHESIOLOGY

## 2021-03-08 PROCEDURE — 2709999900 HC NON-CHARGEABLE SUPPLY: Performed by: ANESTHESIOLOGY

## 2021-03-08 PROCEDURE — 2500000003 HC RX 250 WO HCPCS: Performed by: ANESTHESIOLOGY

## 2021-03-08 PROCEDURE — 6360000002 HC RX W HCPCS: Performed by: ANESTHESIOLOGY

## 2021-03-08 PROCEDURE — 7100000010 HC PHASE II RECOVERY - FIRST 15 MIN: Performed by: ANESTHESIOLOGY

## 2021-03-08 PROCEDURE — 6360000004 HC RX CONTRAST MEDICATION: Performed by: ANESTHESIOLOGY

## 2021-03-08 PROCEDURE — 3209999900 FLUORO FOR SURGICAL PROCEDURES

## 2021-03-08 RX ORDER — METHYLPREDNISOLONE ACETATE 40 MG/ML
INJECTION, SUSPENSION INTRA-ARTICULAR; INTRALESIONAL; INTRAMUSCULAR; SOFT TISSUE PRN
Status: DISCONTINUED | OUTPATIENT
Start: 2021-03-08 | End: 2021-03-08 | Stop reason: ALTCHOICE

## 2021-03-08 RX ORDER — BUPIVACAINE HYDROCHLORIDE 5 MG/ML
INJECTION, SOLUTION EPIDURAL; INTRACAUDAL PRN
Status: DISCONTINUED | OUTPATIENT
Start: 2021-03-08 | End: 2021-03-08 | Stop reason: ALTCHOICE

## 2021-03-08 RX ORDER — LIDOCAINE HYDROCHLORIDE 5 MG/ML
INJECTION, SOLUTION INFILTRATION; INTRAVENOUS PRN
Status: DISCONTINUED | OUTPATIENT
Start: 2021-03-08 | End: 2021-03-08 | Stop reason: ALTCHOICE

## 2021-03-08 ASSESSMENT — PAIN DESCRIPTION - DESCRIPTORS: DESCRIPTORS: ACHING

## 2021-03-08 ASSESSMENT — PAIN SCALES - GENERAL: PAINLEVEL_OUTOF10: 0

## 2021-03-08 NOTE — H&P
Update History & Physical    The patient's History and Physical of March 1, 2021 was reviewed with the patient and I examined the patient. There was no change. The surgical site was confirmed by the patient and me. Plan: The risks, benefits, expected outcome, and alternative to the recommended procedure have been discussed with the patient. Patient understands and wants to proceed with the procedure. The patient was counseled at length about the risks of tawny Covid-19 during their perioperative period and any recovery window from their procedure. The patient was made aware that tawny Covid-19  may worsen their prognosis for recovering from their procedure  and lend to a higher morbidity and/or mortality risk. All material risks, benefits, and reasonable alternatives including postponing the procedure were discussed. The patient does wish to proceed with the procedure at this time.     Electronically signed by Ab Malhotra MD

## 2021-03-21 DIAGNOSIS — R41.841 COGNITIVE COMMUNICATION DEFICIT: ICD-10-CM

## 2021-03-22 ENCOUNTER — OFFICE VISIT (OUTPATIENT)
Dept: PAIN MANAGEMENT | Age: 81
End: 2021-03-22
Payer: COMMERCIAL

## 2021-03-22 VITALS
TEMPERATURE: 98.9 F | HEART RATE: 86 BPM | OXYGEN SATURATION: 96 % | DIASTOLIC BLOOD PRESSURE: 64 MMHG | HEIGHT: 71 IN | WEIGHT: 196 LBS | RESPIRATION RATE: 16 BRPM | BODY MASS INDEX: 27.44 KG/M2 | SYSTOLIC BLOOD PRESSURE: 122 MMHG

## 2021-03-22 DIAGNOSIS — M50.30 DDD (DEGENERATIVE DISC DISEASE), CERVICAL: ICD-10-CM

## 2021-03-22 DIAGNOSIS — M48.061 SPINAL STENOSIS OF LUMBAR REGION, UNSPECIFIED WHETHER NEUROGENIC CLAUDICATION PRESENT: ICD-10-CM

## 2021-03-22 DIAGNOSIS — M54.2 CERVICALGIA: ICD-10-CM

## 2021-03-22 DIAGNOSIS — M51.36 DDD (DEGENERATIVE DISC DISEASE), LUMBAR: Primary | ICD-10-CM

## 2021-03-22 DIAGNOSIS — M47.817 LUMBOSACRAL SPONDYLOSIS WITHOUT MYELOPATHY: ICD-10-CM

## 2021-03-22 PROCEDURE — G8484 FLU IMMUNIZE NO ADMIN: HCPCS | Performed by: ANESTHESIOLOGY

## 2021-03-22 PROCEDURE — 1123F ACP DISCUSS/DSCN MKR DOCD: CPT | Performed by: ANESTHESIOLOGY

## 2021-03-22 PROCEDURE — G8417 CALC BMI ABV UP PARAM F/U: HCPCS | Performed by: ANESTHESIOLOGY

## 2021-03-22 PROCEDURE — 1036F TOBACCO NON-USER: CPT | Performed by: ANESTHESIOLOGY

## 2021-03-22 PROCEDURE — 4040F PNEUMOC VAC/ADMIN/RCVD: CPT | Performed by: ANESTHESIOLOGY

## 2021-03-22 PROCEDURE — G8427 DOCREV CUR MEDS BY ELIG CLIN: HCPCS | Performed by: ANESTHESIOLOGY

## 2021-03-22 PROCEDURE — 99213 OFFICE O/P EST LOW 20 MIN: CPT | Performed by: ANESTHESIOLOGY

## 2021-03-22 RX ORDER — MEMANTINE HYDROCHLORIDE 10 MG/1
TABLET ORAL
Qty: 180 TABLET | Refills: 3 | Status: SHIPPED
Start: 2021-03-22 | End: 2022-02-24

## 2021-03-22 NOTE — PROGRESS NOTES
AROLDO MELENDEZ [facility] - BEHAVIORAL HE[facility] SERVICES Pain Management  [place], [address] [facility]  Dept: [phone]      Follow up Note      Abhilash Enrique     Date of Visit:  3/22/2021    CC:  Patient presents for follow up   Chief Complaint   Patient presents with    Follow-up     1 LUMBAR TRANSFORAMINAL EPIDURAL STEROID INJECTION bilateral L4 UNDER FLUOROSCOPIC GUIDANCE      Leg Pain     both legs from the knee down       HPI:  chronic low back and b/l LE pain. Prior Lumbar spine surgery + years ago      H/o dementia- with his wife for today's visit.     Low back pain and b/l LE pain / tingling. S/P B/l Lumbar TFESI on 3/8/2021: with good pain relief. Nursing notes and details of the pain history reviewed. Please see intake notes for details.     Previous treatments:   Physical Therapy/ HEP : yes      Medications: - Yes     Surgeries: yes, Lumbar spine surgery years ago       He has been on anticoagulation medications yes,  and include ASA.       He has not been on herbal supplements.       He is not diabetic.     TESTS:     EMG: EMG August 2020: Electrodiagnostic examination of the left arm and both legs disclosed evidence diagnostic of the following---1.  A left median neuropathy at/or distal to the wrist, of marked severity.  These findings were consistent with carpal tunnel syndrome. 2.  A left C7 motor radiculopathy or intracanalicular lesion, proven by the abnormal needle testing of the paraspinals. 3.  Left L5 and S1 motor radiculopathies or intracanalicular lesions, again proven by the abnormal needle testing of the paraspinals. There were no other peripheral neuropathies.  Absent sensory neuropathies in both feet are commonly seen in the elderly.  There were no other motor radiculopathies or intracanalicular lesions.  Sensory radiculopathies cannot be evaluated by electrodiagnostic means.     Imaging:      MRI of the lumbar spine 9/15/2020:  Findings:    Sagittal images document loss of lumbar lordosis and slight anterior   wedging of the T11 vertebrae, which appears new since June 2019, but   which is not associated with increased STIR signal to suggest acute   marrow edema/trauma. Modic endplate changes are noted throughout the   lumbar region, and disc spacing and signal are decreased at all   levels. There is no STIR signal to suggest acute marrow edema from   trauma or adjacent paraspinous traumatic change. The conus medullaris   is localized to the lower L1 level, in the distribution of roots in   the cauda equina is uniform, with apparent severe crowding at the L4-5   level due to central canal stenosis. Foraminal stenosis on the right   is noted at the L2-4 levels, and at the L3 and 4 root levels on the   left.       Coronal images document irregular contours of the infrarenal abdominal   aorta, but the maximum diameter is only 3 cm. No paraspinous visceral   pathology is noted. There is a right lateral flexion of the lumbar   spine at the at the L3-4 level, with posterior central canal   transverse diameter narrowing at the L3 and 4 levels by degenerative   change. Sacroiliac joints are unremarkable.       Axial images best depict patency of the central canal and foramina as   follows:   T12-L1: Unremarkable   L1-L2: Unremarkable   L2-L3: Unremarkable, with prominent facet hypertrophy on the right   L3-L4: There is mild circumferential central spinal stenosis with   disc/osteophyte changes likely encroaching on the exiting L3 nerve   root on the left, lateral to the central canal. The posterior canal is   narrowed transversely by hypertrophy ligamentum flavum, which crowds   roots in the descending cauda equina   L4-L5: A transverse disc bulge extends laterally to the left, and may   abut the exiting L4 nerve root lateral to the foramen.    L5-S1: There is stenosis of the left L5 nerve root foramen and   disc/osteophyte changes extend laterally to the left, likely   encroaching on the L5 nerve root.       Extensor musculature in the lower lumbar spine shows extensive fatty   atrophy.           Impression   Extensive degenerative and scoliotic change of the lumbar spine is   associated with crowding of roots of the cauda equina at the L3 and 4   root levels, and probable encroachment on exiting nerve roots at the   left L4 and L5 root levels.       There is no evidence of fracture or paraspinous soft tissue pathology.       Fusiform ectasia of the infrarenal abdominal aorta is minimal and   technically not aneurysmal in caliber (3 cm maximum diameter).      MRI of the cervical spine on 9/15/2020:      FINDINGS:       These images reveal no evidence for acute displaced cortical   disruption or spondylolisthesis. There is straightening of lordosis. There is diffuse disc dehydration and loss of disc height. Diffuse   degenerative spondylosis and facet arthropathy are present. The signal   intensity of the cord is normal. The cervical medullary junction is   unremarkable.       C2-3: No evidence for central or neuroforaminal canal stenosis or   neural impingement.       C3-4: Mild central spinal canal stenosis to 8.6 mm secondary to   posterior disc and osteophyte complex which flattens the ventral   surface of the cord. Mild stenosis of bilateral neuroforamen due to   uncovertebral hypertrophy and degenerative facet arthropathy.       C4-5: No central canal stenosis. Mild right and moderate left   foraminal stenosis due to uncovertebral hypertrophy and degenerative   facet arthropathy.       C5-6: No central canal stenosis. Mild bilateral foraminal stenosis due   to uncovertebral hypertrophy and degenerative facet arthropathy.       C6-7: Central spinal canal stenosis to 8.5 mm secondary to posterior   disc and osteophyte complex. Severe stenosis of bilateral neuroforamen   due to uncovertebral hypertrophy and degenerative facet arthropathy.       C7-T1: No central canal stenosis.  Moderate bilateral foraminal   stenosis due to uncovertebral hypertrophy and degenerative facet   arthropathy.           Impression       Central spinal canal stenosis is present at C3-4 and C6-7.       Neural foraminal canal stenosis is present bilaterally at C3-4, C4-5,   C5-6, C6-7 and C7-T1.          Potential Aberrant Drug-Related Behavior:  no    Urine Drug Screening:no    OARRS report[de-identified]  reviewed - today    Past Medical History:   Diagnosis Date    Abdominal aortic aneurysm (AAA) 3.0 cm to 5.5 cm in diameter in male [facility]) 06/14/2019    wife unaware    Acute respiratory failure with hypoxia ([address] 6/14/2019    Aspiration pneumonia ([address] 6/14/2019    CVA (cerebral vascular accident) ([address]     x 3    Dementia ([address]     Depression     Heart murmur     Hematemesis     history of    Osteoarthritis     Prostate cancer ([address] 2007    Sepsis ([facility] 6/14/2019    Present on admission    Stage 3 chronic kidney disease 6/14/2019       Past Surgical History:   Procedure Laterality Date    CATARACT REMOVAL      HERNIA REPAIR      PAIN MANAGEMENT PROCEDURE Bilateral 3/8/2021    # 1 LUMBAR TRANSFORAMINAL EPIDURAL STEROID INJECTION bilateral L4 UNDER FLUOROSCOPIC GUIDANCE performed by Anirudh Ho MD at [address] [place] [address].      beacons/radiation    UPPER GASTROINTESTINAL ENDOSCOPY N/A 6/15/2019    EGD ESOPHAGOGASTRODUODENOSCOPY WITH BIOPSY performed by Miguelina Wyatt MD at [address] [facility]       Prior to Admission medications    Medication Sig Start Date End Date Taking?  Authorizing Provider   memantine (NAMENDA) 10 MG tablet TAKE 1 TABLET TWICE A DAY 3/22/21  Yes LIZETH Rios CNP   rivastigmine (EXELON) 1.5 MG capsule Take 1 capsule by mouth 2 times daily 2/18/21  Yes LIZETH Rios CNP   Omega-3 Fatty Acids (FISH OIL) 1000 MG CAPS Take 1,000 mg by mouth every morning Last dose 3-1-21   Yes Historical Provider, MD   Multiple Vitamins-Minerals (THERAPEUTIC MULTIVITAMIN-MINERALS) tablet Take 1 tablet by mouth every morning Last dose 3-1-21   Yes Historical Provider, MD   vitamin E 400 UNIT capsule Take 400 Units by mouth every morning Last dose 3-1-21 12/9/09  Yes Historical Provider, MD   pantoprazole (PROTONIX) 40 MG tablet Take 40 mg by mouth every morning  3/12/18  Yes Historical Provider, MD   atorvastatin (LIPITOR) 40 MG tablet Take 40 mg by mouth every morning    Yes Historical Provider, MD   amLODIPine (NORVASC) 10 MG tablet Take 10 mg by mouth every morning    Yes Historical Provider, MD   vitamin B-12 (CYANOCOBALAMIN) 500 MCG tablet Take 500 mcg by mouth every morning    Yes Historical Provider, MD   aspirin 81 MG chewable tablet Take 1 tablet by mouth daily  Patient taking differently: Take 81 mg by mouth every morning  17  Yes LIZETH Jett CNP   diazepam (VALIUM) 5 MG tablet Take 5 mg by mouth every morning.     Yes Historical Provider, MD       Allergies   Allergen Reactions    Oxycodone-Acetaminophen Nausea And Vomiting     Other reaction(s): GI Upset    Vicodin [Hydrocodone-Acetaminophen] Nausea And Vomiting       Social History     Socioeconomic History    Marital status:      Spouse name: Not on file    Number of children: Not on file    Years of education: Not on file    Highest education level: Not on file   Occupational History    Occupation: [employer]   Social Needs    Financial resource strain: Not on file    Food insecurity     Worry: Not on file     Inability: Not on file   [employer] needs     Medical: Not on file     Non-medical: Not on file   Tobacco Use    Smoking status: Former Smoker     Packs/day: 1.00     Years: 34.00     Pack years: 34.00     Types: Cigarettes     Start date: 1959     Quit date: 1987     Years since quittin.8    Smokeless tobacco: Never Used   Substance and Sexual Activity    Alcohol use: Not Currently     Comment: 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    Drug use: Never    Sexual activity: Not on file   Lifestyle    Physical activity     Days per week: Not on file     Minutes per session: Not on file    Stress: Not on file   Relationships    Social connections     Talks on phone: Not on file     Gets together: Not on file     Attends [employer] service: Not on file     Active member of club or organization: Not on file     Attends meetings of clubs or organizations: Not on file     Relationship status: Not on file    Intimate partner violence     Fear of current or ex partner: Not on file     Emotionally abused: Not on file     Physically abused: Not on file     Forced sexual activity: Not on file   Other Topics Concern    Not on file   Social History Narrative    Not on file       Family History   Problem Relation Age of Onset    Heart Disease Father     Crohn's Disease Sister        REVIEW OF SYSTEMS:     Lauren Betancourt denies fever/chills, chest pain, shortness of breath, new bowel or bladder complaints. All other review of systems was negative. PHYSICAL EXAMINATION:      /64   Pulse 86   Temp 98.9 °F (37.2 °C)   Resp 16   Ht 5' 11\" (1.803 m)   Wt 196 lb (88.9 kg)   SpO2 96%   BMI 27.34 kg/m²      General:       General appearance:  Pleasant and well-hydrated, in no distress and A & O x 3  Build:Normal Weight  Function: Rises from seated position easily and Moves about room without difficulty     HEENT:     Head:normocephalic, atraumatic    Lungs:     Breathing:normal breathing pattern   CVS:     RRR     Abdomen:     Shape:non-distended and normal     Cervical spine:     Inspection:normal     Thoracic spine:                Spine inspection:normal      Lumbar spine:     Spine inspection: Scar from the prior surgery + healed well  Palpation: Tenderness paravertebral muscles mild   Range of motion: Decreased, flexion Decreased, Lateral bending, extension and rotation bilaterally reduced is not painful.   Sacroiliac joint tenderness No bilaterally  Piriformis tenderness: negative bilaterally  SLR : negative bilaterally  Trochanteric bursa tenderness: negative bilaterally  CVA tenderness:No      Musculoskeletal:     Trigger points no     Extremities:     B/l LE edema +     Neurological:     Sensory: Normal to light touch      Motor:   Right  5/5              Left  5/5               Right Bicep 5/5           Left Bicep 5/5              Right Triceps 5/5       Left Triceps 5/5          Right Deltoid 5/5     Left Deltoid 5/5                  Right Quadriceps 5/5          Left Quadriceps 5/5           Right Gastrocnemius 5/5    Left Gastrocnemius 5/5  Right Ant Tibialis 5/5  Left Ant Tibialis 5/5     Reflexes:    B/l Equal     Gait:normal Yes     Dermatology:     Skin:no rashes or lesions noted      Assessment/Plan:  1. DDD (degenerative disc disease), lumbar      2. Lumbosacral spondylosis without myelopathy      3. Spinal stenosis of lumbar region, unspecified whether neurogenic claudication present      4. DDD (degenerative disc disease), cervical      5. Cervical radiculopathy      6. Cervical spondylolysis            79 y.o.  male with H/o low back and B/l LE pain. H/o Prior lumbar spine surgery years ago.     Also has chronic Neck pain and left UE pain- pain is stable.      Failed conservative treatment.     S/P B/l L4 TFESI with good pain relief and functional improvement. LE swelling has also improved. If pain recurs, will plan repeat TFESI. He can call to schedule if pain recurs.      Hold ASA-81 mg for the procedure     Neck pain is stable. Consider Cervical ARIANNE in future.     Will set up for PT.    F/U in 2-3 months.     H/o Dementia- f/u with neurology.     Urine screen today: no     Counseling :     Patient encouraged to stay active and to watch/lose weight     Encouraged to continue Regular home exercise program as tolerated - stretching / strengthening.     Treatment plan discussed with the patient including medication and procedure side effects.     Controlled Substances Monitoring:   OARRS reviewed    Paige Toussaint MD      CC:  Marti Smiley Breanna Hahn MD

## 2021-06-17 ENCOUNTER — TELEPHONE (OUTPATIENT)
Dept: ADMINISTRATIVE | Age: 81
End: 2021-06-17

## 2021-06-17 NOTE — TELEPHONE ENCOUNTER
Renita Gentile called to reschedule her husbands appt with Juno Ochoa for today stating she was unaware there was an appt scheduled until yesterday. Next available appt was scheduled on 8/11/21. She then asked if we could contact the [address] [place] [address] about extending the referral.  Per Caitie Tidwell at the office, that would be the pts responsibility. Renita Gentile was notified and verbalized understanding.

## 2021-06-21 ENCOUNTER — OFFICE VISIT (OUTPATIENT)
Dept: PAIN MANAGEMENT | Age: 81
End: 2021-06-21
Payer: MEDICARE

## 2021-06-21 ENCOUNTER — PREP FOR PROCEDURE (OUTPATIENT)
Dept: PAIN MANAGEMENT | Age: 81
End: 2021-06-21

## 2021-06-21 VITALS
SYSTOLIC BLOOD PRESSURE: 118 MMHG | HEART RATE: 78 BPM | TEMPERATURE: 98.5 F | DIASTOLIC BLOOD PRESSURE: 66 MMHG | RESPIRATION RATE: 16 BRPM

## 2021-06-21 DIAGNOSIS — M54.12 CERVICAL RADICULOPATHY: ICD-10-CM

## 2021-06-21 DIAGNOSIS — M50.30 DDD (DEGENERATIVE DISC DISEASE), CERVICAL: Primary | ICD-10-CM

## 2021-06-21 DIAGNOSIS — M51.36 DDD (DEGENERATIVE DISC DISEASE), LUMBAR: ICD-10-CM

## 2021-06-21 DIAGNOSIS — M48.061 SPINAL STENOSIS OF LUMBAR REGION, UNSPECIFIED WHETHER NEUROGENIC CLAUDICATION PRESENT: ICD-10-CM

## 2021-06-21 DIAGNOSIS — M43.02 CERVICAL SPONDYLOLYSIS: ICD-10-CM

## 2021-06-21 DIAGNOSIS — M47.817 LUMBOSACRAL SPONDYLOSIS WITHOUT MYELOPATHY: ICD-10-CM

## 2021-06-21 PROCEDURE — 99213 OFFICE O/P EST LOW 20 MIN: CPT

## 2021-06-21 PROCEDURE — G8427 DOCREV CUR MEDS BY ELIG CLIN: HCPCS | Performed by: ANESTHESIOLOGY

## 2021-06-21 PROCEDURE — 1123F ACP DISCUSS/DSCN MKR DOCD: CPT | Performed by: ANESTHESIOLOGY

## 2021-06-21 PROCEDURE — 1036F TOBACCO NON-USER: CPT | Performed by: ANESTHESIOLOGY

## 2021-06-21 PROCEDURE — 4040F PNEUMOC VAC/ADMIN/RCVD: CPT | Performed by: ANESTHESIOLOGY

## 2021-06-21 PROCEDURE — 99213 OFFICE O/P EST LOW 20 MIN: CPT | Performed by: ANESTHESIOLOGY

## 2021-06-21 PROCEDURE — G8417 CALC BMI ABV UP PARAM F/U: HCPCS | Performed by: ANESTHESIOLOGY

## 2021-06-21 RX ORDER — IBUPROFEN 200 MG
200 TABLET ORAL EVERY 6 HOURS PRN
COMMUNITY
End: 2021-07-26

## 2021-06-21 NOTE — PROGRESS NOTES
Do you currently have any of the following:    Fever: No  Headache:  No  Cough: No  Shortness of breath: No  Exposed to anyone with these symptoms: No         Melanie Willis [facility] the [facility]21. Shantel Qureshi is complaining of pain in his arms, hands, legs and feet. The pain is persistent. The pain is described as numb. Pain is rated on his best day at a 5, on his worst day at a 10, and on average at a 5 on the VAS scale. He took his last dose of Motrin around 6 months ago. Any procedures since your last visit: No.    Pacemaker or defibrillator: No managed by N/A. He is on NSAIDS and is on anticoagulation medications to include ASA and is managed by Kathy Yoo MD.     /66   Pulse 78   Temp 98.5 °F (36.9 °C)   Resp 16      No LMP for male patient.

## 2021-06-21 NOTE — PROGRESS NOTES
AROLDO MARTEL [facility] - BE[facility]AVIOR[facility] Pain Management  Elisha Lopes  Dept: [phone]      Follow up Note      Prem Maloney     Date of Visit:  6/21/2021    CC:  Patient presents for follow up   Chief Complaint   Patient presents with    Follow-up    Arm Pain     left > right    Leg Pain     into feet both side equally       HPI:  chronic low back and b/l LE pain. Prior Lumbar spine surgery + years ago      H/o dementia- with his wife for today's visit.     Low back pain and b/l LE pain / tingling. S/P B/l Lumbar TFESI on 3/8/2021: with good pain relief. Nursing notes and details of the pain history reviewed. Please see intake notes for details. Neck pain and UE pain / tingling/ numbness     Previous treatments:   Physical Therapy/ HEP : yes      Medications: - Yes     Surgeries: yes, Lumbar spine surgery years ago       He has been on anticoagulation medications yes,  and include ASA.     He has not been on herbal supplements.       He is not diabetic.     TESTS:     EMG: EMG August 2020: Electrodiagnostic examination of the left arm and both legs disclosed evidence diagnostic of the following---1.  A left median neuropathy at/or distal to the wrist, of marked severity.  These findings were consistent with carpal tunnel syndrome. 2.  A left C7 motor radiculopathy or intracanalicular lesion, proven by the abnormal needle testing of the paraspinals. 3.  Left L5 and S1 motor radiculopathies or intracanalicular lesions, again proven by the abnormal needle testing of the paraspinals. There were no other peripheral neuropathies.  Absent sensory neuropathies in both feet are commonly seen in the elderly.  There were no other motor radiculopathies or intracanalicular lesions.  Sensory radiculopathies cannot be evaluated by electrodiagnostic means.     Imaging:      MRI of the lumbar spine 9/15/2020:  Findings:    Sagittal images document loss of lumbar lordosis and slight anterior   wedging of the T11 vertebrae, which appears new since June 2019, but   which is not associated with increased STIR signal to suggest acute   marrow edema/trauma. Modic endplate changes are noted throughout the   lumbar region, and disc spacing and signal are decreased at all   levels. There is no STIR signal to suggest acute marrow edema from   trauma or adjacent paraspinous traumatic change. The conus medullaris   is localized to the lower L1 level, in the distribution of roots in   the cauda equina is uniform, with apparent severe crowding at the L4-5   level due to central canal stenosis. Foraminal stenosis on the right   is noted at the L2-4 levels, and at the L3 and 4 root levels on the   left.       Coronal images document irregular contours of the infrarenal abdominal   aorta, but the maximum diameter is only 3 cm. No paraspinous visceral   pathology is noted. There is a right lateral flexion of the lumbar   spine at the at the L3-4 level, with posterior central canal   transverse diameter narrowing at the L3 and 4 levels by degenerative   change. Sacroiliac joints are unremarkable.       Axial images best depict patency of the central canal and foramina as   follows:   T12-L1: Unremarkable   L1-L2: Unremarkable   L2-L3: Unremarkable, with prominent facet hypertrophy on the right   L3-L4: There is mild circumferential central spinal stenosis with   disc/osteophyte changes likely encroaching on the exiting L3 nerve   root on the left, lateral to the central canal. The posterior canal is   narrowed transversely by hypertrophy ligamentum flavum, which crowds   roots in the descending cauda equina   L4-L5: A transverse disc bulge extends laterally to the left, and may   abut the exiting L4 nerve root lateral to the foramen.    L5-S1: There is stenosis of the left L5 nerve root foramen and   disc/osteophyte changes extend laterally to the left, likely   encroaching on the L5 nerve root.       Extensor musculature in the lower lumbar spine shows extensive fatty   atrophy.           Impression   Extensive degenerative and scoliotic change of the lumbar spine is   associated with crowding of roots of the cauda equina at the L3 and 4   root levels, and probable encroachment on exiting nerve roots at the   left L4 and L5 root levels.       There is no evidence of fracture or paraspinous soft tissue pathology.       Fusiform ectasia of the infrarenal abdominal aorta is minimal and   technically not aneurysmal in caliber (3 cm maximum diameter).      MRI of the cervical spine on 9/15/2020:      FINDINGS:       These images reveal no evidence for acute displaced cortical   disruption or spondylolisthesis. There is straightening of lordosis. There is diffuse disc dehydration and loss of disc height. Diffuse   degenerative spondylosis and facet arthropathy are present. The signal   intensity of the cord is normal. The cervical medullary junction is   unremarkable.       C2-3: No evidence for central or neuroforaminal canal stenosis or   neural impingement.       C3-4: Mild central spinal canal stenosis to 8.6 mm secondary to   posterior disc and osteophyte complex which flattens the ventral   surface of the cord. Mild stenosis of bilateral neuroforamen due to   uncovertebral hypertrophy and degenerative facet arthropathy.       C4-5: No central canal stenosis. Mild right and moderate left   foraminal stenosis due to uncovertebral hypertrophy and degenerative   facet arthropathy.       C5-6: No central canal stenosis. Mild bilateral foraminal stenosis due   to uncovertebral hypertrophy and degenerative facet arthropathy.       C6-7: Central spinal canal stenosis to 8.5 mm secondary to posterior   disc and osteophyte complex. Severe stenosis of bilateral neuroforamen   due to uncovertebral hypertrophy and degenerative facet arthropathy.       C7-T1: No central canal stenosis.  Moderate bilateral foraminal   stenosis due to uncovertebral hypertrophy and degenerative (THERAPEUTIC MULTIVITAMIN-MINERALS) tablet Take 1 tablet by mouth every morning Last dose 3-1-21   Yes Historical Provider, MD   vitamin E 400 UNIT capsule Take 400 Units by mouth every morning Last dose 3-1-21 12/9/09  Yes Historical Provider, MD   pantoprazole (PROTONIX) 40 MG tablet Take 40 mg by mouth every morning  3/12/18  Yes Historical Provider, MD   atorvastatin (LIPITOR) 40 MG tablet Take 40 mg by mouth every morning    Yes Historical Provider, MD   amLODIPine (NORVASC) 10 MG tablet Take 10 mg by mouth every morning    Yes Historical Provider, MD   vitamin B-12 (CYANOCOBALAMIN) 500 MCG tablet Take 500 mcg by mouth every morning    Yes Historical Provider, MD   aspirin 81 MG chewable tablet Take 1 tablet by mouth daily  Patient taking differently: Take 81 mg by mouth every morning  17  Yes LIZETH Cole CNP   diazepam (VALIUM) 5 MG tablet Take 5 mg by mouth every morning.     Yes Historical Provider, MD       Allergies   Allergen Reactions    Oxycodone-Acetaminophen Nausea And Vomiting     Other reaction(s): GI Upset    Vicodin [Hydrocodone-Acetaminophen] Nausea And Vomiting       Social History     Socioeconomic History    Marital status:      Spouse name: Not on file    Number of children: Not on file    Years of education: Not on file    Highest education level: Not on file   Occupational History    Occupation: [employer]   Tobacco Use    Smoking status: Former Smoker     Packs/day: 1.00     Years: 34.00     Pack years: 34.00     Types: Cigarettes     Start date: 1959     Quit date: 1987     Years since quittin.1    Smokeless tobacco: Never Used   Vaping Use    Vaping Use: Never used   Substance and Sexual Activity    Alcohol use: Not Currently     Comment: 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    Drug use: Never    Sexual activity: Not on file   Other Topics Concern    Not on file   Social History Narrative    Not on file     Social Determinants of Health Financial Resource Strain:     Difficulty of Paying Living Expenses:    Food Insecurity:     Worried About Running Out of Food in the Last Year:     [address] in the Last Year:    Transportation Needs:     Lack of Transportation (Medical):  Lack of Transportation (Non-Medical):    Physical Activity:     Days of Exercise per Week:     Minutes of Exercise per Session:    Stress:     Feeling of Stress :    Social Connections:     Frequency of Communication with Friends and Family:     Frequency of Social Gatherings with Friends and Family:     Attends Synagogue Services:     Active Member of Clubs or Organizations:     Attends Club or Organization Meetings:     Marital Status:    Intimate Partner Violence:     Fear of Current or Ex-Partner:     Emotionally Abused:     Physically Abused:     Sexually Abused:        Family History   Problem Relation Age of Onset    Heart Disease Father     Crohn's Disease Sister        REVIEW OF SYSTEMS:     Dalila Luis Enriqueaubrey denies fever/chills, chest pain, shortness of breath, new bowel or bladder complaints. All other review of systems was negative. PHYSICAL EXAMINATION:      /66   Pulse 78   Temp 98.5 °F (36.9 °C)   Resp 16      General:       General appearance:  Pleasant and well-hydrated, in no distress and A & O x 3  Build:Normal Weight  Function: Rises from seated position easily and Moves about room without difficulty     HEENT:     Head:normocephalic, atraumatic    Lungs:     Breathing:normal breathing pattern   CVS:     RRR     Abdomen:     Shape:non-distended and normal     Cervical spine:     Inspection:normal     Thoracic spine:                Spine inspection:normal      Lumbar spine:             Spine inspection: Scar from the prior surgery + healed well  Palpation: Tenderness paravertebral muscles mild   Range of motion: Decreased, flexion Decreased, Lateral bending, extension and rotation bilaterally reduced is not painful.   Sacroiliac joint tenderness No bilaterally  Piriformis tenderness: negative bilaterally  SLR : negative bilaterally  Trochanteric bursa tenderness: negative bilaterally  CVA tenderness:No      Musculoskeletal:     Trigger points no     Extremities:     B/l LE edema +     Neurological:     Sensory: Normal to light touch      Motor:   Right  5/5              Left  5/5               Right Bicep 5/5           Left Bicep 5/5              Right Triceps 5/5       Left Triceps 5/5          Right Deltoid 5/5     Left Deltoid 5/5                  Right Quadriceps 5/5          Left Quadriceps 5/5           Right Gastrocnemius 5/5    Left Gastrocnemius 5/5  Right Ant Tibialis 5/5  Left Ant Tibialis 5/5     Reflexes:    B/l Equal     Gait:normal Yes     Dermatology:     Skin:no rashes or lesions noted    Assessment/Plan:  1. DDD (degenerative disc disease), lumbar      2. Lumbosacral spondylosis without myelopathy      3. Spinal stenosis of lumbar region, unspecified whether neurogenic claudication present      4. DDD (degenerative disc disease), cervical      5. Cervical radiculopathy      6. Cervical spondylolysis            79 y.o.  male with H/o low back and B/l LE pain. H/o Prior lumbar spine surgery years ago.     Also has chronic Neck pain and left UE pain- pain is stable.      Failed conservative treatment.     S/P B/l L4 TFESI with good pain relief and functional improvement. LE swelling has also improved. If pain recurs, will plan repeat TFESI. He can call to schedule if pain recurs.      Neck pain and UE pain / tingling numbness bothering much now. is stable. Consider Cervical ARIANNE C7-T1 under fluoroscopy. RBA discussed. Hold ASA-81 mg for the procedure    Will set up for PT.     H/o Dementia- f/u with neurology.     He is active and takes care of his sone with ALS.      Counseling :Patient encouraged to stay active and to watch/lose weight and to continue Regular home exercise program as tolerated - stretching / strengthening.     Treatment plan discussed with the patient including medication and procedure side effects.     Controlled Substances Monitoring:   [place] reviewed    Ramya Lopez MD      CC:  Jelani Jose MD

## 2021-06-22 ENCOUNTER — TELEPHONE (OUTPATIENT)
Dept: PAIN MANAGEMENT | Age: 81
End: 2021-06-22

## 2021-06-22 DIAGNOSIS — M48.061 SPINAL STENOSIS OF LUMBAR REGION, UNSPECIFIED WHETHER NEUROGENIC CLAUDICATION PRESENT: ICD-10-CM

## 2021-06-22 DIAGNOSIS — M54.2 CERVICALGIA: ICD-10-CM

## 2021-06-22 DIAGNOSIS — M43.02 CERVICAL SPONDYLOLYSIS: ICD-10-CM

## 2021-06-22 DIAGNOSIS — M54.12 CERVICAL RADICULOPATHY: ICD-10-CM

## 2021-06-22 DIAGNOSIS — M51.36 DDD (DEGENERATIVE DISC DISEASE), LUMBAR: ICD-10-CM

## 2021-06-22 DIAGNOSIS — M50.30 DDD (DEGENERATIVE DISC DISEASE), CERVICAL: Primary | ICD-10-CM

## 2021-06-22 DIAGNOSIS — M47.817 LUMBOSACRAL SPONDYLOSIS WITHOUT MYELOPATHY: ICD-10-CM

## 2021-06-22 NOTE — TELEPHONE ENCOUNTER
Milli Leblanc from [facility] called and feels that Houston French needs home care referral.  I did call Milli Leblanc back at [phone] and left a message.

## 2021-06-22 NOTE — TELEPHONE ENCOUNTER
I did speak to Ruma Ortiz at [facility] and he stated that Sherley Plata and his family would like Home PT.   Would you like me to put it in?

## 2021-06-30 NOTE — TELEPHONE ENCOUNTER
My recommendation was for the PT to teach the patient home exercise program - Not home PT. Please communicate with Pt and inform them to teach the patient home exercise program.     Thank you.

## 2021-06-30 NOTE — TELEPHONE ENCOUNTER
Call to Mr. Carson Fisher to inform him that Dr. Ramos Lombardi wanted him to go to PT outside of the home to learn exercises.

## 2021-07-01 NOTE — TELEPHONE ENCOUNTER
I called and spoke to his wife. The patient has dementia and it is very hard to get him to PT, and the wife also has a son with ALS that she helps her daughter in law feed and take care of.   Is it possible to do home PT

## 2021-07-02 RX ORDER — RIVASTIGMINE TARTRATE 1.5 MG/1
1.5 CAPSULE ORAL 2 TIMES DAILY
Qty: 180 CAPSULE | Refills: 3 | Status: SHIPPED
Start: 2021-07-02 | End: 2021-11-01 | Stop reason: SDUPTHER

## 2021-07-02 NOTE — TELEPHONE ENCOUNTER
Maria Luz Murphy for home PT. Please coordinate with [facility] if you need anything. Thanks.

## 2021-07-08 ENCOUNTER — TELEPHONE (OUTPATIENT)
Dept: PAIN MANAGEMENT | Age: 81
End: 2021-07-08

## 2021-07-13 ENCOUNTER — TELEPHONE (OUTPATIENT)
Dept: PAIN MANAGEMENT | Age: 81
End: 2021-07-13

## 2021-07-27 ENCOUNTER — TELEPHONE (OUTPATIENT)
Dept: PAIN MANAGEMENT | Age: 81
End: 2021-07-27

## 2021-07-27 NOTE — TELEPHONE ENCOUNTER
7-27-21-received a message that Mary Roopa wants to cancel his 7-29-21 procedure with Dr Louisa Seaman, he is having family problems and needs to cancel at this time. Call to [facility] surgery scheduling.     Kaz Alves, RN  Pain Management

## 2021-07-27 NOTE — TELEPHONE ENCOUNTER
Patient wife contacted office to have patient removed from procedure on 07.29.2021 advising that it was a personal matter and would contact the office to reschedule at a later time. /carolyn

## 2021-09-05 ENCOUNTER — APPOINTMENT (OUTPATIENT)
Dept: CT IMAGING | Age: 81
End: 2021-09-05
Payer: MEDICARE

## 2021-09-05 ENCOUNTER — APPOINTMENT (OUTPATIENT)
Dept: GENERAL RADIOLOGY | Age: 81
End: 2021-09-05
Payer: MEDICARE

## 2021-09-05 ENCOUNTER — HOSPITAL ENCOUNTER (EMERGENCY)
Age: 81
Discharge: HOME | End: 2021-09-05
Attending: EMERGENCY MEDICINE
Payer: MEDICARE

## 2021-09-05 VITALS
DIASTOLIC BLOOD PRESSURE: 79 MMHG | OXYGEN SATURATION: 97 % | TEMPERATURE: 98.8 F | RESPIRATION RATE: 14 BRPM | SYSTOLIC BLOOD PRESSURE: 129 MMHG | WEIGHT: 195 LBS | BODY MASS INDEX: 27.92 KG/M2 | HEART RATE: 77 BPM | HEIGHT: 70 IN

## 2021-09-05 DIAGNOSIS — R42 VERTIGO: Primary | ICD-10-CM

## 2021-09-05 LAB
ALBUMIN SERPL-MCNC: 4 G/DL (ref 3.5–5.2)
ALP BLD-CCNC: 115 U/L (ref 40–129)
ALT SERPL-CCNC: 11 U/L (ref 0–40)
ANION GAP SERPL CALCULATED.3IONS-SCNC: 10 MMOL/L (ref 7–16)
AST SERPL-CCNC: 19 U/L (ref 0–39)
BASOPHILS ABSOLUTE: 0.03 E9/L (ref 0–0.2)
BASOPHILS RELATIVE PERCENT: 0.5 % (ref 0–2)
BILIRUB SERPL-MCNC: 0.9 MG/DL (ref 0–1.2)
BILIRUBIN URINE: NEGATIVE
BLOOD, URINE: NEGATIVE
BUN BLDV-MCNC: 19 MG/DL (ref 6–23)
CALCIUM SERPL-MCNC: 9.4 MG/DL (ref 8.6–10.2)
CHLORIDE BLD-SCNC: 105 MMOL/L (ref 98–107)
CLARITY: CLEAR
CO2: 25 MMOL/L (ref 22–29)
COLOR: NORMAL
CREAT SERPL-MCNC: 1.2 MG/DL (ref 0.7–1.2)
EKG ATRIAL RATE: 71 BPM
EKG P AXIS: 42 DEGREES
EKG P-R INTERVAL: 214 MS
EKG Q-T INTERVAL: 398 MS
EKG QRS DURATION: 90 MS
EKG QTC CALCULATION (BAZETT): 432 MS
EKG R AXIS: 0 DEGREES
EKG T AXIS: 28 DEGREES
EKG VENTRICULAR RATE: 71 BPM
EOSINOPHILS ABSOLUTE: 0.27 E9/L (ref 0.05–0.5)
EOSINOPHILS RELATIVE PERCENT: 4.1 % (ref 0–6)
GFR AFRICAN AMERICAN: >60
GFR NON-AFRICAN AMERICAN: 58 ML/MIN/1.73
GLUCOSE BLD-MCNC: 116 MG/DL (ref 74–99)
GLUCOSE URINE: NEGATIVE MG/DL
HCT VFR BLD CALC: 45.3 % (ref 37–54)
HEMOGLOBIN: 15.4 G/DL (ref 12.5–16.5)
IMMATURE GRANULOCYTES #: 0.02 E9/L
IMMATURE GRANULOCYTES %: 0.3 % (ref 0–5)
KETONES, URINE: NEGATIVE MG/DL
LEUKOCYTE ESTERASE, URINE: NEGATIVE
LYMPHOCYTES ABSOLUTE: 2.03 E9/L (ref 1.5–4)
LYMPHOCYTES RELATIVE PERCENT: 30.5 % (ref 20–42)
MCH RBC QN AUTO: 31.7 PG (ref 26–35)
MCHC RBC AUTO-ENTMCNC: 34 % (ref 32–34.5)
MCV RBC AUTO: 93.2 FL (ref 80–99.9)
MONOCYTES ABSOLUTE: 0.87 E9/L (ref 0.1–0.95)
MONOCYTES RELATIVE PERCENT: 13.1 % (ref 2–12)
NEUTROPHILS ABSOLUTE: 3.43 E9/L (ref 1.8–7.3)
NEUTROPHILS RELATIVE PERCENT: 51.5 % (ref 43–80)
NITRITE, URINE: NEGATIVE
PDW BLD-RTO: 12.2 FL (ref 11.5–15)
PH UA: 7 (ref 5–9)
PLATELET # BLD: 174 E9/L (ref 130–450)
PMV BLD AUTO: 10.1 FL (ref 7–12)
POTASSIUM REFLEX MAGNESIUM: 4.8 MMOL/L (ref 3.5–5)
PROTEIN UA: NEGATIVE MG/DL
RBC # BLD: 4.86 E12/L (ref 3.8–5.8)
SODIUM BLD-SCNC: 140 MMOL/L (ref 132–146)
SPECIFIC GRAVITY UA: 1.01 (ref 1–1.03)
TOTAL PROTEIN: 6.6 G/DL (ref 6.4–8.3)
TROPONIN, HIGH SENSITIVITY: 29 NG/L (ref 0–11)
TROPONIN, HIGH SENSITIVITY: 31 NG/L (ref 0–11)
UROBILINOGEN, URINE: 0.2 E.U./DL
WBC # BLD: 6.7 E9/L (ref 4.5–11.5)

## 2021-09-05 PROCEDURE — 73000 X-RAY EXAM OF COLLAR BONE: CPT

## 2021-09-05 PROCEDURE — 36415 COLL VENOUS BLD VENIPUNCTURE: CPT

## 2021-09-05 PROCEDURE — 81003 URINALYSIS AUTO W/O SCOPE: CPT

## 2021-09-05 PROCEDURE — 70450 CT HEAD/BRAIN W/O DYE: CPT

## 2021-09-05 PROCEDURE — 84484 ASSAY OF TROPONIN QUANT: CPT

## 2021-09-05 PROCEDURE — 80053 COMPREHEN METABOLIC PANEL: CPT

## 2021-09-05 PROCEDURE — 93005 ELECTROCARDIOGRAM TRACING: CPT | Performed by: EMERGENCY MEDICINE

## 2021-09-05 PROCEDURE — 71045 X-RAY EXAM CHEST 1 VIEW: CPT

## 2021-09-05 PROCEDURE — 2580000003 HC RX 258: Performed by: STUDENT IN AN ORGANIZED HEALTH CARE EDUCATION/TRAINING PROGRAM

## 2021-09-05 PROCEDURE — 85025 COMPLETE CBC W/AUTO DIFF WBC: CPT

## 2021-09-05 PROCEDURE — 93010 ELECTROCARDIOGRAM REPORT: CPT | Performed by: INTERNAL MEDICINE

## 2021-09-05 PROCEDURE — 6370000000 HC RX 637 (ALT 250 FOR IP): Performed by: STUDENT IN AN ORGANIZED HEALTH CARE EDUCATION/TRAINING PROGRAM

## 2021-09-05 PROCEDURE — 99285 EMERGENCY DEPT VISIT HI MDM: CPT

## 2021-09-05 RX ORDER — LORAZEPAM 0.5 MG/1
0.5 TABLET ORAL ONCE
Status: COMPLETED | OUTPATIENT
Start: 2021-09-05 | End: 2021-09-05

## 2021-09-05 RX ORDER — MECLIZINE HYDROCHLORIDE 25 MG/1
25 TABLET ORAL 3 TIMES DAILY PRN
Qty: 30 TABLET | Refills: 0 | Status: SHIPPED | OUTPATIENT
Start: 2021-09-05 | End: 2021-09-15

## 2021-09-05 RX ORDER — MECLIZINE HCL 12.5 MG/1
25 TABLET ORAL ONCE
Status: COMPLETED | OUTPATIENT
Start: 2021-09-05 | End: 2021-09-05

## 2021-09-05 RX ORDER — 0.9 % SODIUM CHLORIDE 0.9 %
1000 INTRAVENOUS SOLUTION INTRAVENOUS ONCE
Status: COMPLETED | OUTPATIENT
Start: 2021-09-05 | End: 2021-09-05

## 2021-09-05 RX ADMIN — SODIUM CHLORIDE 1000 ML: 9 INJECTION, SOLUTION INTRAVENOUS at 11:54

## 2021-09-05 RX ADMIN — MECLIZINE 25 MG: 12.5 TABLET ORAL at 14:41

## 2021-09-05 RX ADMIN — LORAZEPAM 0.5 MG: 0.5 TABLET ORAL at 13:11

## 2021-09-05 ASSESSMENT — ENCOUNTER SYMPTOMS
NAUSEA: 0
EYE PAIN: 0
SINUS PRESSURE: 0
VOMITING: 0
COUGH: 0
EYE DISCHARGE: 0
BACK PAIN: 0
EYE REDNESS: 0
WHEEZING: 0
SHORTNESS OF BREATH: 0
SORE THROAT: 0
DIARRHEA: 0
ABDOMINAL PAIN: 0

## 2021-09-05 NOTE — ED PROVIDER NOTES
Patient presents with dizziness that began this morning. His last known well was last night. Patient states that he does have a history of 3 strokes in the past.  He also mentions he has some numbness in his left arm however this been ongoing for couple days at this time. He denies any other numbness, tingling, or weakness. He also endorses some gait abnormalities. Nothing has made his symptoms better however her moving his head does make his symptoms worse. Patient states he has baseline tinnitus but denies any other acute hearing changes. He denies any vision changes. Patient does endorses some pain in his left collarbone but otherwise denies any pain. The history is provided by the patient. No  was used. Review of Systems   Constitutional: Negative for chills and fever. HENT: Negative for ear pain, sinus pressure and sore throat. Eyes: Negative for pain, discharge and redness. Respiratory: Negative for cough, shortness of breath and wheezing. Cardiovascular: Negative for chest pain. Gastrointestinal: Negative for abdominal pain, diarrhea, nausea and vomiting. Genitourinary: Negative for dysuria and frequency. Musculoskeletal: Positive for gait problem. Negative for arthralgias and back pain. Skin: Negative for rash and wound. Neurological: Positive for dizziness. Negative for weakness and headaches. Hematological: Negative for adenopathy. All other systems reviewed and are negative. Physical Exam  Vitals and nursing note reviewed. Constitutional:       General: He is not in acute distress. Appearance: He is well-developed. He is not diaphoretic. HENT:      Head: Normocephalic and atraumatic. Eyes:      Extraocular Movements: Extraocular movements intact. Conjunctiva/sclera: Conjunctivae normal.      Pupils: Pupils are equal, round, and reactive to light.       Comments: Negative hints exam   Cardiovascular:      Rate and Rhythm: Normal rate and regular rhythm. Heart sounds: Normal heart sounds. No murmur heard. Pulmonary:      Effort: Pulmonary effort is normal. No respiratory distress. Breath sounds: Normal breath sounds. No wheezing or rales. Abdominal:      General: Bowel sounds are normal.      Palpations: Abdomen is soft. Tenderness: There is no abdominal tenderness. There is no guarding or rebound. Musculoskeletal:         General: Tenderness present. No deformity or signs of injury. Cervical back: Normal range of motion and neck supple. No rigidity or tenderness. Comments: Pain to palpation left clavicle   Skin:     General: Skin is warm and dry. Neurological:      Mental Status: He is alert and oriented to person, place, and time. Cranial Nerves: No cranial nerve deficit. Sensory: Sensory deficit present. Motor: No weakness. Coordination: Coordination normal.      NIH Stroke Scale/Score at time of initial evaluation:  1A: Level of Consciousness 0 - alert; keenly responsive   1B: Ask Month and Age 1 - answers one question correctly   1C:  Tell Patient To Open and Close Eyes, then Hand  Squeeze 0 - performs both tasks correctly   2: Test Horizontal Extraocular Movements 0 - normal   3: Test Visual Fields 0 - no visual loss   4: Test Facial Palsy 0 - normal symmetric movement   5A: Test Left Arm Motor Drift 0 - no drift, limb holds 90 (or 45) degrees for full 10 seconds   5B: Test Right Arm Motor Drift 0 - no drift, limb holds 90 (or 45) degrees for full 10 seconds   6A: Test Left Leg Motor Drift 0 - no drift; leg holds 30 degree position for full 5 seconds   6B: Test Right Leg Motor Drift 0 - no drift; leg holds 30 degree position for full 5 seconds   7: Test Limb Ataxia   (FNF/Heel-Shin) 0 - absent   8: Test Sensation 1 - mild to moderate sensory loss; patient feels pinprick is less sharp or is dull on the affected side; there is a loss of superficial pain with pinprick but patient is aware of being touched    9: Test Language/Aphasia 0 - no aphasia, normal   10: Test Dysarthria 0 - normal   11: Test Extinction/Inattention 0 - no abnormality   Total 2         Procedures     MDM  Number of Diagnoses or Management Options  Vertigo  Diagnosis management comments: Patient presents with dizziness. Patient does have a history of stroke. NIH stroke scale was 2 although this does appear to be his baseline. Last known well was greater than 4-1/2 hours ago. Patient is not a candidate for TPA. Stroke alert not called. Hints exam was negative. [place] was negative. CBC was unremarkable. Chest x-ray did not show any acute cardiopulmonary processes. CT of the head did not show any acute intracranial pathology however did show a previous infarct. X-ray of the left clavicle did not show any acute osseous processes. Urinalysis was unremarkable. CMP was unremarkable. Troponin was elevated at 31. Repeat was 29. Patient was given Ativan for their dizziness and did not improve. Antivert was then given with resolution. At this point there is not appear to be any emergent process ongoing. Patient was informed results and plan and is agreeable. Patient be discharged with a prescription for Antivert and advised to follow-up with his PCP for further evaluation and care. Amount and/or Complexity of Data Reviewed  Clinical lab tests: reviewed  Tests in the radiology section of CPT®: reviewed  Tests in the medicine section of CPT®: reviewed         ED Course as of Sep 05 1632   Sun Sep 05, 2021   1030 EKG: This EKG is signed and interpreted by the EP. Time: 10:32  Rate: 71  Rhythm: Sinus  Interpretation: NSR, 1st degree AV block  Comparison: stable as compared to patient's most recent EKG      [CF]   1536 Patient reevaluated and states that his vertigo has resolved.     [BB]      ED Course User Index  [BB] Junito Singh DO  [CF] Gustavo Abrams DO          ED Course as of Sep Gail Bee  50. Sep 05, 2021   1030 EKG: This EKG is signed and interpreted by the EP. Time: 10:32  Rate: 71  Rhythm: Sinus  Interpretation: NSR, 1st degree AV block  Comparison: stable as compared to patient's most recent EKG      [CF]   1536 Patient reevaluated and states that his vertigo has resolved. [BB]      ED Course User Index  [BB] Angelito Jacinto   [CF] Paula Haiders        --------------------------------------------- PAST HISTORY ---------------------------------------------  Past Medical History:  has a past medical history of Abdominal aortic aneurysm (AAA) 3.0 cm to 5.5 cm in diameter in male [facility]), Acute respiratory failure with hypoxia ([facility] [place] [address] Aspiration pneumonia ([facility] [place] [address] COVID-19, CVA (cerebral vascular accident) ([facility] [place] [address] Dementia ([facility] [place] [address] Depression, Heart murmur, Hematemesis, RAF (obstructive sleep apnea), Osteoarthritis, Prostate cancer ([facility] [place] [address] Sepsis ([facility] [place] [address] and Stage 3 chronic kidney disease ([facility] [place] [address] Past Surgical History:  has a past surgical history that includes Prostate surgery; hernia repair; Cataract removal; Upper gastrointestinal endoscopy (N/A, 6/15/2019); and Pain management procedure (Bilateral, 3/8/2021). Social History:  reports that he quit smoking about 34 years ago. His smoking use included cigarettes. He started smoking about 62 years ago. He has a 34.00 pack-year smoking history. He has never used smokeless tobacco. He reports previous alcohol use. He reports that he does not use drugs. Family History: family history includes Crohn's Disease in his sister; Heart Disease in his father. The patients home medications have been reviewed.     Allergies: Oxycodone-acetaminophen and Vicodin [hydrocodone-acetaminophen]    -------------------------------------------------- RESULTS -------------------------------------------------  Labs:  Results for orders placed or performed during the hospital encounter of 09/05/21   CBC Auto Differential   Result Value Ref Range WBC 6.7 4.5 - 11.5 E9/L    RBC 4.86 3.80 - 5.80 E12/L    Hemoglobin 15.4 12.5 - 16.5 g/dL    Hematocrit 45.3 37.0 - 54.0 %    MCV 93.2 80.0 - 99.9 fL    MCH 31.7 26.0 - 35.0 pg    MCHC 34.0 32.0 - 34.5 %    RDW 12.2 11.5 - 15.0 fL    Platelets 245 595 - 803 E9/L    MPV 10.1 7.0 - 12.0 fL    Neutrophils % 51.5 43.0 - 80.0 %    Immature Granulocytes % 0.3 0.0 - 5.0 %    Lymphocytes % 30.5 20.0 - 42.0 %    Monocytes % 13.1 (H) 2.0 - 12.0 %    Eosinophils % 4.1 0.0 - 6.0 %    Basophils % 0.5 0.0 - 2.0 %    Neutrophils Absolute 3.43 1.80 - 7.30 E9/L    Immature Granulocytes # 0.02 E9/L    Lymphocytes Absolute 2.03 1.50 - 4.00 E9/L    Monocytes Absolute 0.87 0.10 - 0.95 E9/L    Eosinophils Absolute 0.27 0.05 - 0.50 E9/L    Basophils Absolute 0.03 0.00 - 0.20 E9/L   Comprehensive Metabolic Panel w/ Reflex to MG   Result Value Ref Range    Sodium 140 132 - 146 mmol/L    Potassium reflex Magnesium 4.8 3.5 - 5.0 mmol/L    Chloride 105 98 - 107 mmol/L    CO2 25 22 - 29 mmol/L    Anion Gap 10 7 - 16 mmol/L    Glucose 116 (H) 74 - 99 mg/dL    BUN 19 6 - 23 mg/dL    CREATININE 1.2 0.7 - 1.2 mg/dL    GFR Non-African American 58 >=60 mL/min/1.73    GFR African American >60     Calcium 9.4 8.6 - 10.2 mg/dL    Total Protein 6.6 6.4 - 8.3 g/dL    Albumin 4.0 3.5 - 5.2 g/dL    Total Bilirubin 0.9 0.0 - 1.2 mg/dL    Alkaline Phosphatase 115 40 - 129 U/L    ALT 11 0 - 40 U/L    AST 19 0 - 39 U/L   Troponin   Result Value Ref Range    Troponin, High Sensitivity 31 (H) 0 - 11 ng/L   Urinalysis, reflex to microscopic   Result Value Ref Range    Color, UA Straw Straw/Yellow    Clarity, UA Clear Clear    Glucose, Ur Negative Negative mg/dL    Bilirubin Urine Negative Negative    Ketones, Urine Negative Negative mg/dL    Specific Gravity, UA 1.010 1.005 - 1.030    Blood, Urine Negative Negative    pH, UA 7.0 5.0 - 9.0    Protein, UA Negative Negative mg/dL    Urobilinogen, Urine 0.2 <2.0 E.U./dL    Nitrite, Urine Negative Negative    Leukocyte Esterase, Urine Negative Negative   Troponin   Result Value Ref Range    Troponin, High Sensitivity 29 (H) 0 - 11 ng/L   EKG 12 Lead   Result Value Ref Range    Ventricular Rate 71 BPM    Atrial Rate 71 BPM    P-R Interval 214 ms    QRS Duration 90 ms    Q-T Interval 398 ms    QTc Calculation (Bazett) 432 ms    P Axis 42 degrees    R Axis 0 degrees    T Axis 28 degrees       Radiology:  XR CHEST PORTABLE   Final Result   1. There are no findings of pneumonia or failure. 2. Calcified pleural plaques consistent with previous asbestos exposure. XR CLAVICLE LEFT   Final Result   1. There is no fracture of the left clavicle. 2. Degenerative changes of the left AC joint. CT Head WO Contrast   Final Result   1. There is no acute intracranial abnormality. Specifically, there is no   intracranial hemorrhage. 2. Atrophy and periventricular leukomalacia,   3. Old left occipital lobe infarct. Bridget Amezcua ------------------------- NURSING NOTES AND VITALS REVIEWED ---------------------------  Date / Time Roomed:  9/5/2021 10:09 AM  ED Bed Assignment:  24/24    The nursing notes within the ED encounter and vital signs as below have been reviewed. BP (!) 149/70   Pulse 75   Temp 98.8 °F (37.1 °C) (Oral)   Resp 16   Ht 5' 10\" (1.778 m)   Wt 195 lb (88.5 kg)   SpO2 96%   BMI 27.98 kg/m²   Oxygen Saturation Interpretation: Normal      ------------------------------------------ PROGRESS NOTES ------------------------------------------  4:33 PM EDT  I have spoken with the patient and discussed todays results, in addition to providing specific details for the plan of care and counseling regarding the diagnosis and prognosis. Their questions are answered at this time and they are agreeable with the plan. I discussed at length with them reasons for immediate return here for re evaluation.  They will followup with their primary care physician by calling their office tomorrow. --------------------------------- ADDITIONAL PROVIDER NOTES ---------------------------------  At this time the patient is without objective evidence of an acute process requiring hospitalization or inpatient management. They have remained hemodynamically stable throughout their entire ED visit and are stable for discharge with outpatient follow-up. The plan has been discussed in detail and they are aware of the specific conditions for emergent return, as well as the importance of follow-up. New Prescriptions    MECLIZINE (ANTIVERT) 25 MG TABLET    Take 1 tablet by mouth 3 times daily as needed for Dizziness       Diagnosis:  1. Vertigo        Disposition:  Patient's disposition: Discharge to home  Patient's condition is stable.     Patient was seen and evaluated by both myself and Elvin France, [facility]  Resident  09/05/21 [id number]

## 2021-09-05 NOTE — ED NOTES
Bed: 24  Expected date:   Expected time:   Means of arrival:   Comments:  EMS vertigo     Gurpreet Mccollum RN  09/05/21 [id number]

## 2021-09-05 NOTE — ED TRIAGE NOTES
Pt arrives to ED today with c/o dizziness that started this morning, denies falling or hitting head, pt states he has had 3 strokes in past.. Arrives via [address] Pt placed on ED cardiac monitor, continuous pulse ox, and intermittent BP monitoring. Reviewed all pertinent medical history, home medication list, allergies, and code status. Pt updated on ED POC, verbalized understanding. Will continue to assess and monitor    Wife to room from home.

## 2021-09-05 NOTE — PROGRESS NOTES
Pt orthostatic vitals are negative.  Pt asymptomatic throughout the process of checking orthostatic VS.

## 2021-09-23 NOTE — PROGRESS NOTES
pain management and received injections in his low back which was helpful for his pain--there were plans for injections in his neck but they have not yet scheduled this. Continues to take an aspirin and statin daily for secondary stroke prevention. BPs have been good. Current Outpatient Medications   Medication Sig Dispense Refill    rivastigmine (EXELON) 1.5 MG capsule Take 1 capsule by mouth 2 times daily 180 capsule 3    memantine (NAMENDA) 10 MG tablet TAKE 1 TABLET TWICE A  tablet 3    Multiple Vitamins-Minerals (THERAPEUTIC MULTIVITAMIN-MINERALS) tablet Take 1 tablet by mouth every morning Last dose 3-1-21      vitamin E 400 UNIT capsule Take 400 Units by mouth every morning Last dose 3-1-21      pantoprazole (PROTONIX) 40 MG tablet Take 40 mg by mouth every morning       atorvastatin (LIPITOR) 40 MG tablet Take 40 mg by mouth every morning       amLODIPine (NORVASC) 10 MG tablet Take 10 mg by mouth every morning       vitamin B-12 (CYANOCOBALAMIN) 500 MCG tablet Take 500 mcg by mouth every morning       aspirin 81 MG chewable tablet Take 1 tablet by mouth daily (Patient taking differently: Take 81 mg by mouth every morning )      diazepam (VALIUM) 5 MG tablet Take 5 mg by mouth every morning. No current facility-administered medications for this visit.      Objective:     /73 (Site: Left Upper Arm, Position: Sitting, Cuff Size: Medium Adult)   Pulse 84   Temp 98.2 °F (36.8 °C) (Infrared)   Ht 5' 10\" (1.778 m)   Wt 195 lb (88.5 kg)   SpO2 90%   BMI 27.98 kg/m²     General appearance: alert, appears stated age, cooperative--appears to have aged since his last visit and looks depressed  Head: normocephalic/atraumatic  Eyes: sclerae, conjunctivae/corneas clear no drainage  Neck: no carotid bruits; limited ROM with no cogwheeling  Lungs: clear to auscultation bilaterally; resps nonlabored  Heart: regular rate and rhythm---no murmur  Extremities: trace edema BLE  Pulses: 2+ and symmetric  Skin: no abrasions or rashes; dry skin BLE    Mental Status: alert and oriented x 4---depressed and tearful    Appropriate attention/concentration  Evidence of short-term memory issues  Able to perform simple and complex commands well    Speech: no dysarthria  Language: no aphasias    Cranial Nerves:  I: smell    II: visual acuity     II: visual fields Full    II: pupils ARNOLD   III,VII: ptosis None     III,IV,VI: extraocular muscles  EOMI with no nystagmus   V: mastication Normal   V: facial light touch sensation  Normal   V,VII: corneal reflex     VII: facial muscle function - upper  Normal   VII: facial muscle function - lower ?slight L mouth droop that improves with smiling   VIII: hearing Decreased R---wears aids   IX: soft palate elevation  Normal   IX,X: gag reflex    XI: trapezius strength  5/5   XI: sternocleidomastoid strength 5/5   XI: neck extension strength  5/5   XII: tongue strength  Normal     Motor:  5/5 throughout  No drift  Spastic legs; normal bulk  No abnormal movements  Rapid shaking of his head produces dizziness    Sensory:  LT normal b/l    Coordination:   FN and FFM intact b/l    Gait:  Spastic, steppage gait with some shuffling  No festination; turns en bloc    DTR:   Right Brachioradialis reflex 1+  Left Brachioradialis reflex 1+  Right Biceps reflex 1+  Left Biceps reflex 1+  Right Triceps reflex 1+  Left Triceps reflex 1+  Right Quadriceps reflex 1+  Left Quadriceps reflex 1+  Right Achilles reflex 0  Left Achilles reflex 0    No Rodriguez's  No grasps, suck, or other pathological reflexes    Laboratory/Radiology:  ry/Radiology:     Lab Results   Component Value Date     09/05/2021    K 4.8 09/05/2021     09/05/2021    CO2 25 09/05/2021    BUN 19 09/05/2021    CREATININE 1.2 09/05/2021    GLUCOSE 116 (H) 09/05/2021    CALCIUM 9.4 09/05/2021    PROT 6.6 09/05/2021    LABALBU 4.0 09/05/2021    BILITOT 0.9 09/05/2021    ALKPHOS 115 09/05/2021    AST 19 09/05/2021 ALT 11 09/05/2021    LABGLOM 58 09/05/2021    GFRAA >60 09/05/2021       Lab Results   Component Value Date    WBC 6.7 09/05/2021    HGB 15.4 09/05/2021    HCT 45.3 09/05/2021    MCV 93.2 09/05/2021     09/05/2021    LYMPHOPCT 30.5 09/05/2021    RBC 4.86 09/05/2021    MCH 31.7 09/05/2021    MCHC 34.0 09/05/2021    RDW 12.2 09/05/2021     CT head 9/6: no acute events    All labs and images personally reviewed today    Assessment:     New onset dizziness: Probable positional vertigo, but with his vascular risk factors must rule out central cause such as posterior circulation stroke. His exam appears to be nonfocal at this time. 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 Addition of an SSRI to his memory agents may prove beneficial.  No behavioral issues. Cervical stenosis with radiculopathy: causing spastic paraparetic gait    LS radiculopathy: crowding of cauda equina on MRI--now following with pain management--declining additional medications.   Need cervical evaluation    Severe L CTS    AAA    Hx remote occipital stroke     Plan:     MRI brain WO ASAP    Start Zoloft 25 mg daily for 14 days then 50 mg daily    Continue aspirin and high intensity statin    Reordered [facility]    Pain management following    Continue Exelon tabs 1.5 mg BID and Namenda 10 mg BID    Emotional support provided; no driving, wife will monitor meds    RTO in 6 months or sooner PRAL Chaudhary, LIZETH - CNP  3:18 PM  9/23/2021

## 2021-09-24 ENCOUNTER — TELEPHONE (OUTPATIENT)
Dept: NEUROLOGY | Age: 81
End: 2021-09-24

## 2021-09-24 ENCOUNTER — OFFICE VISIT (OUTPATIENT)
Dept: NEUROLOGY | Age: 81
End: 2021-09-24
Payer: COMMERCIAL

## 2021-09-24 VITALS
WEIGHT: 195 LBS | HEIGHT: 70 IN | HEART RATE: 84 BPM | BODY MASS INDEX: 27.92 KG/M2 | OXYGEN SATURATION: 90 % | DIASTOLIC BLOOD PRESSURE: 73 MMHG | SYSTOLIC BLOOD PRESSURE: 115 MMHG | TEMPERATURE: 98.2 F

## 2021-09-24 DIAGNOSIS — R42 VERTIGO: Primary | ICD-10-CM

## 2021-09-24 DIAGNOSIS — R26.89 BALANCE PROBLEM: ICD-10-CM

## 2021-09-24 DIAGNOSIS — R29.90 STROKE-LIKE SYMPTOMS: ICD-10-CM

## 2021-09-24 PROCEDURE — 1123F ACP DISCUSS/DSCN MKR DOCD: CPT | Performed by: NURSE PRACTITIONER

## 2021-09-24 PROCEDURE — G8427 DOCREV CUR MEDS BY ELIG CLIN: HCPCS | Performed by: NURSE PRACTITIONER

## 2021-09-24 PROCEDURE — 4040F PNEUMOC VAC/ADMIN/RCVD: CPT | Performed by: NURSE PRACTITIONER

## 2021-09-24 PROCEDURE — G8417 CALC BMI ABV UP PARAM F/U: HCPCS | Performed by: NURSE PRACTITIONER

## 2021-09-24 PROCEDURE — 1036F TOBACCO NON-USER: CPT | Performed by: NURSE PRACTITIONER

## 2021-09-24 PROCEDURE — 99214 OFFICE O/P EST MOD 30 MIN: CPT | Performed by: NURSE PRACTITIONER

## 2021-09-24 RX ORDER — ONDANSETRON 4 MG/1
4 TABLET, FILM COATED ORAL EVERY 12 HOURS PRN
Qty: 60 TABLET | Refills: 0 | Status: SHIPPED
Start: 2021-09-24 | End: 2021-09-26 | Stop reason: SDUPTHER

## 2021-09-24 NOTE — PATIENT INSTRUCTIONS
Patient Education            NO DRIVING        sertraline  Pronunciation:  SER tra nanci  Brand:  Zoloft  What is the most important information I should know about sertraline? Some young people have thoughts about suicide when first taking an antidepressant. Stay alert to changes in your mood or symptoms. Report any new or worsening symptoms to your doctor. What is sertraline? Sertraline is a selective serotonin reuptake inhibitor (SSRI). Sertraline is used to treat major depressive disorder, obsessive-compulsive disorder (OCD), panic disorder, social anxiety disorder (SAD), and post-traumatic stress disorder (PTSD). Sertraline is also used to treat premenstrual dysphoric disorder. Sertraline may also be used for purposes not listed in this medication guide. What should I discuss with my healthcare provider before taking sertraline? You should not use sertraline if you are allergic to it, or if you also take pimozide. Do not use the liquid form of sertraline  if you take disulfiram (Antabuse). Do not use sertraline within 14 days before or 14 days after using an MAO inhibitor. A dangerous drug interaction could occur. MAO inhibitors include isocarboxazid, linezolid, methylene blue injection, phenelzine, tranylcypromine, and others. Tell your doctor if you also take stimulant medicine, opioid medicine, herbal products, or medicine for depression, mental illness, Parkinson's disease, migraine headaches, serious infections, or prevention of nausea and vomiting. An interaction with sertraline could cause a serious condition called serotonin syndrome. Tell your doctor if you have ever had:  · bipolar disorder (manic depression);  · heart disease, high blood pressure, or a stroke;  · liver or kidney disease;  · seizures;  · glaucoma;  · bleeding problems, or if you take warfarin (Coumadin, Jantoven);  · long QT syndrome; or  · low levels of sodium in your blood.   Some young people have thoughts about suicide when first taking an antidepressant. Your doctor should check your progress at regular visits. Your family or other caregivers should also be alert to changes in your mood or symptoms. Sertraline is approved for use in children at least 10years old, only to treat obsessive-compulsive disorder but not depression. Taking this medicine during pregnancy could harm the baby, but stopping the medicine may not be safe for you. Do not start or stop sertraline without asking your doctor. Ask a doctor if it is safe to breastfeed while using this medicine. How should I take sertraline? Follow all directions on your prescription label and read all medication guides or instruction sheets. Your doctor may occasionally change your dose. Use the medicine exactly as directed. Take sertraline with or without food, at the same time each day. Sertraline liquid (oral concentrate) must be diluted with a liquid right before you take it. Read and carefully follow all mixing instructions provided with your medicine. Ask your doctor or pharmacist if you need help. Measure the mixed medicine with the supplied syringe or a dose-measuring device (not a kitchen spoon). Sertraline may cause false results on a drug-screening urine test. Tell the laboratory staff that you use sertraline. Do not stop using sertraline suddenly, or you could have unpleasant symptoms (such as agitation, confusion, tingling or electric shock feelings). Ask your doctor before stopping the medicine. Store tightly closed at room temperature, away from moisture and heat. What happens if I miss a dose? Take the medicine as soon as you can, but skip the missed dose if it is almost time for your next dose. Do not take two doses at one time. What happens if I overdose? Seek emergency medical attention or call the Poison Help line at 1-155.337.1174. What should I avoid while taking sertraline? Drinking alcohol with this medicine can cause side effects.   Avoid driving or hazardous activity until you know how this medicine will affect you. Your reactions could be impaired. What are the possible side effects of sertraline? Get emergency medical help if you have signs of an allergic reaction: skin rash or hives (with or without fever or joint pain); difficulty breathing; swelling of your face, lips, tongue, or throat. Report any new or worsening symptoms to your doctor, such as: mood or behavior changes, anxiety, panic attacks, trouble sleeping, or if you feel impulsive, irritable, agitated, hostile, aggressive, restless, hyperactive (mentally or physically), more depressed, or have thoughts about suicide or hurting yourself. Call your doctor at once if you have:  · a seizure;  · vision changes, eye pain, redness, or swelling;  · low blood sodium --headache, confusion, problems with thinking or memory, weakness, feeling unsteady; or  · manic episodes --racing thoughts, increased energy, unusual risk-taking behavior, extreme happiness, being irritable or talkative. Seek medical attention right away if you have symptoms of serotonin syndrome, such as: agitation, hallucinations, fever, sweating, shivering, fast heart rate, muscle stiffness, twitching, loss of coordination, nausea, vomiting, or diarrhea. Sertraline can affect growth in children. Your child's height and weight may be checked often. Common side effects may include:  · indigestion, nausea, diarrhea, loss of appetite;  · sweating;  · tremors; or  · sexual problems. This is not a complete list of side effects and others may occur. Call your doctor for medical advice about side effects. You may report side effects to FDA at 0-210-FDA-5523. What other drugs will affect sertraline? Sertraline can cause a serious heart problem. Your risk may be higher if you also use certain other medicines for infections, asthma, heart problems, high blood pressure, depression, mental illness, cancer, malaria, or HIV.   Ask directions, precautions, warnings, drug interactions, allergic reactions, or adverse effects. If you have questions about the drugs you are taking, check with your doctor, nurse or pharmacist.  Copyright 9511-3498 [address]: 22.01. Revision date: 5/26/2021. Care instructions adapted under license by [facility] ([facility]). If you have questions about a medical condition or this instruction, always ask your healthcare professional. [place] [address] any warranty or liability for your use of this information. Patient Education        Learning About FAST: Stroke Warning Signs  What is FAST? FAST is a simple way to remember the main symptoms of stroke. Recognizing these symptoms helps you know when to call for medical help. FAST stands for:  · F ace drooping. · A rm weakness. · S peech difficulty. · T abbey to call 911. Other stroke symptoms can include sudden confusion, a severe headache, and problems with vision or balance. What happens when you have a stroke? A stroke occurs when a blood vessel to the brain bursts or is blocked by a blood clot. Within minutes, the nerve cells in that part of the brain die. As a result, the part of the body controlled by those cells cannot work properly. The effects of a stroke may range from mild to severe. They may get better, or they may last the rest of your life. A stroke can affect vision, speech, behavior, thought processes, and your ability to move. It can cause symptoms that may include:  · Sudden numbness, tingling, weakness, or loss of movement in your face, arm, or leg, especially on only one side of your body. · Sudden vision changes. · Sudden trouble speaking. · Sudden confusion or trouble understanding simple statements. · Sudden problems with walking or balance. · A sudden, severe headache that is different from past headaches. Why is it important to get help FAST? Quick treatment may save your life.  And it may reduce the damage in your brain so that you have fewer problems after the stroke. When you know stroke symptoms, you will know when it's important to call for medical help. Where can you learn more? Go to [email or web address] and sign in to your "[employer]" account. Enter V668 in the [facility] box to learn more about \"Learning About FAST: Stroke Warning Signs. \"     If you do not have an account, please click on the \"Sign Up Now\" link. Current as of: July 6, 2021               Content Version: 13.0  © 7758-4572 [employer]. Care instructions adapted under license by [facility] ([facility]). If you have questions about a medical condition or this instruction, always ask your healthcare professional. [address] any warranty or liability for your use of this information.

## 2021-09-26 ENCOUNTER — HOSPITAL ENCOUNTER (EMERGENCY)
Age: 81
Discharge: HOME | End: 2021-09-26
Attending: EMERGENCY MEDICINE
Payer: MEDICARE

## 2021-09-26 ENCOUNTER — APPOINTMENT (OUTPATIENT)
Dept: CT IMAGING | Age: 81
End: 2021-09-26
Payer: MEDICARE

## 2021-09-26 VITALS
WEIGHT: 177.31 LBS | SYSTOLIC BLOOD PRESSURE: 110 MMHG | HEIGHT: 71 IN | BODY MASS INDEX: 24.82 KG/M2 | HEART RATE: 78 BPM | TEMPERATURE: 98.7 F | OXYGEN SATURATION: 98 % | RESPIRATION RATE: 16 BRPM | DIASTOLIC BLOOD PRESSURE: 77 MMHG

## 2021-09-26 DIAGNOSIS — R11.2 NON-INTRACTABLE VOMITING WITH NAUSEA, UNSPECIFIED VOMITING TYPE: Primary | ICD-10-CM

## 2021-09-26 DIAGNOSIS — R10.84 GENERALIZED ABDOMINAL PAIN: ICD-10-CM

## 2021-09-26 LAB
ALBUMIN SERPL-MCNC: 4.3 G/DL (ref 3.5–5.2)
ALP BLD-CCNC: 136 U/L (ref 40–129)
ALT SERPL-CCNC: 11 U/L (ref 0–40)
ANION GAP SERPL CALCULATED.3IONS-SCNC: 12 MMOL/L (ref 7–16)
AST SERPL-CCNC: 16 U/L (ref 0–39)
BACTERIA: ABNORMAL /HPF
BASOPHILS ABSOLUTE: 0.01 E9/L (ref 0–0.2)
BASOPHILS RELATIVE PERCENT: 0.1 % (ref 0–2)
BILIRUB SERPL-MCNC: 0.8 MG/DL (ref 0–1.2)
BILIRUBIN URINE: NEGATIVE
BLOOD, URINE: ABNORMAL
BUN BLDV-MCNC: 28 MG/DL (ref 6–23)
CALCIUM SERPL-MCNC: 9 MG/DL (ref 8.6–10.2)
CHLORIDE BLD-SCNC: 103 MMOL/L (ref 98–107)
CLARITY: CLEAR
CO2: 24 MMOL/L (ref 22–29)
COLOR: YELLOW
CREAT SERPL-MCNC: 1.4 MG/DL (ref 0.7–1.2)
CRYSTALS, UA: ABNORMAL /HPF
EOSINOPHILS ABSOLUTE: 0.17 E9/L (ref 0.05–0.5)
EOSINOPHILS RELATIVE PERCENT: 1.9 % (ref 0–6)
GFR AFRICAN AMERICAN: 59
GFR NON-AFRICAN AMERICAN: 49 ML/MIN/1.73
GLUCOSE BLD-MCNC: 158 MG/DL (ref 74–99)
GLUCOSE URINE: NEGATIVE MG/DL
HCT VFR BLD CALC: 52.7 % (ref 37–54)
HEMOGLOBIN: 17.4 G/DL (ref 12.5–16.5)
IMMATURE GRANULOCYTES #: 0.03 E9/L
IMMATURE GRANULOCYTES %: 0.3 % (ref 0–5)
KETONES, URINE: NEGATIVE MG/DL
LACTIC ACID, SEPSIS: 2.3 MMOL/L (ref 0.5–1.9)
LEUKOCYTE ESTERASE, URINE: NEGATIVE
LIPASE: 40 U/L (ref 13–60)
LYMPHOCYTES ABSOLUTE: 1.28 E9/L (ref 1.5–4)
LYMPHOCYTES RELATIVE PERCENT: 14.6 % (ref 20–42)
MCH RBC QN AUTO: 31 PG (ref 26–35)
MCHC RBC AUTO-ENTMCNC: 33 % (ref 32–34.5)
MCV RBC AUTO: 93.9 FL (ref 80–99.9)
MONOCYTES ABSOLUTE: 1.46 E9/L (ref 0.1–0.95)
MONOCYTES RELATIVE PERCENT: 16.7 % (ref 2–12)
NEUTROPHILS ABSOLUTE: 5.79 E9/L (ref 1.8–7.3)
NEUTROPHILS RELATIVE PERCENT: 66.4 % (ref 43–80)
NITRITE, URINE: NEGATIVE
PDW BLD-RTO: 12.2 FL (ref 11.5–15)
PH UA: 5.5 (ref 5–9)
PLATELET # BLD: 246 E9/L (ref 130–450)
PMV BLD AUTO: 9.7 FL (ref 7–12)
POTASSIUM REFLEX MAGNESIUM: 5 MMOL/L (ref 3.5–5)
PROTEIN UA: NEGATIVE MG/DL
RBC # BLD: 5.61 E12/L (ref 3.8–5.8)
RBC UA: ABNORMAL /HPF (ref 0–2)
SODIUM BLD-SCNC: 139 MMOL/L (ref 132–146)
SPECIFIC GRAVITY UA: 1.02 (ref 1–1.03)
TOTAL PROTEIN: 7 G/DL (ref 6.4–8.3)
UROBILINOGEN, URINE: 0.2 E.U./DL
WBC # BLD: 8.7 E9/L (ref 4.5–11.5)
WBC UA: ABNORMAL /HPF (ref 0–5)

## 2021-09-26 PROCEDURE — 80053 COMPREHEN METABOLIC PANEL: CPT

## 2021-09-26 PROCEDURE — 85025 COMPLETE CBC W/AUTO DIFF WBC: CPT

## 2021-09-26 PROCEDURE — 83690 ASSAY OF LIPASE: CPT

## 2021-09-26 PROCEDURE — 2580000003 HC RX 258: Performed by: EMERGENCY MEDICINE

## 2021-09-26 PROCEDURE — 6360000004 HC RX CONTRAST MEDICATION: Performed by: RADIOLOGY

## 2021-09-26 PROCEDURE — 74177 CT ABD & PELVIS W/CONTRAST: CPT

## 2021-09-26 PROCEDURE — 81001 URINALYSIS AUTO W/SCOPE: CPT

## 2021-09-26 PROCEDURE — 83605 ASSAY OF LACTIC ACID: CPT

## 2021-09-26 PROCEDURE — 99284 EMERGENCY DEPT VISIT MOD MDM: CPT

## 2021-09-26 RX ORDER — ONDANSETRON 4 MG/1
4 TABLET, FILM COATED ORAL EVERY 12 HOURS PRN
Qty: 60 TABLET | Refills: 0 | Status: SHIPPED | OUTPATIENT
Start: 2021-09-26 | End: 2022-04-04

## 2021-09-26 RX ORDER — ONDANSETRON 2 MG/ML
4 INJECTION INTRAMUSCULAR; INTRAVENOUS ONCE
Status: DISCONTINUED | OUTPATIENT
Start: 2021-09-26 | End: 2021-09-27 | Stop reason: HOSPADM

## 2021-09-26 RX ORDER — 0.9 % SODIUM CHLORIDE 0.9 %
1000 INTRAVENOUS SOLUTION INTRAVENOUS ONCE
Status: COMPLETED | OUTPATIENT
Start: 2021-09-26 | End: 2021-09-26

## 2021-09-26 RX ADMIN — IOPAMIDOL 75 ML: 755 INJECTION, SOLUTION INTRAVENOUS at 18:46

## 2021-09-26 RX ADMIN — SODIUM CHLORIDE 1000 ML: 9 INJECTION, SOLUTION INTRAVENOUS at 17:27

## 2021-09-26 ASSESSMENT — ENCOUNTER SYMPTOMS
SORE THROAT: 0
ABDOMINAL PAIN: 0
EYE PAIN: 0
SINUS PRESSURE: 0
NAUSEA: 0
COUGH: 0
EYE DISCHARGE: 0
EYE REDNESS: 0
BACK PAIN: 0
VOMITING: 0
DIARRHEA: 0
WHEEZING: 0
SHORTNESS OF BREATH: 0

## 2021-09-26 NOTE — ED PROVIDER NOTES
Patient presents with abdominal pain. Patient states that is a generalized abdominal pain worse in the middle and is been ongoing for the past 2 days. He rates his pain as a 5 out of 10. Patient states that he did feel unwell 2 days prior to that and that his malaise has progressively worsened. Patient states he also has some nausea, vomiting, and diarrhea that accompany it. Patient states his diarrhea just been loose. He believes he has lost 26 pounds over the past couple days. He has not noticed any blood in his emesis or stool. Patient states that he has not done anything to make his symptoms better nor worse. He denies any fevers, chest pain, shortness of breath, cough, or dysuria. Patient is vaccinated. The history is provided by the patient. No  was used. Review of Systems   Constitutional: Negative for chills and fever. HENT: Negative for ear pain, sinus pressure and sore throat. Eyes: Negative for pain, discharge and redness. Respiratory: Negative for cough, shortness of breath and wheezing. Cardiovascular: Negative for chest pain. Gastrointestinal: Negative for abdominal pain, diarrhea, nausea and vomiting. Genitourinary: Negative for dysuria and frequency. Musculoskeletal: Negative for arthralgias and back pain. Skin: Negative for rash and wound. Neurological: Negative for weakness and headaches. Hematological: Negative for adenopathy. All other systems reviewed and are negative. Physical Exam  Vitals and nursing note reviewed. Constitutional:       General: He is not in acute distress. Appearance: He is well-developed. HENT:      Head: Normocephalic and atraumatic. Eyes:      Conjunctiva/sclera: Conjunctivae normal.   Cardiovascular:      Rate and Rhythm: Normal rate and regular rhythm. Heart sounds: Normal heart sounds. No murmur heard. Pulmonary:      Effort: Pulmonary effort is normal. No respiratory distress. Breath sounds: Normal breath sounds. No wheezing or rales. Abdominal:      General: Abdomen is flat. Bowel sounds are normal. There is no distension. Palpations: Abdomen is soft. Tenderness: There is no abdominal tenderness. There is no guarding or rebound. Musculoskeletal:         General: No tenderness or deformity. Cervical back: Normal range of motion and neck supple. Skin:     General: Skin is warm and dry. Neurological:      Mental Status: He is alert and oriented to person, place, and time. Cranial Nerves: No cranial nerve deficit. Coordination: Coordination normal.          Procedures     MDM  Number of Diagnoses or Management Options  Generalized abdominal pain  Non-intractable vomiting with nausea, unspecified vomiting type  Diagnosis management comments: Patient presents with nausea vomiting and abdominal pain. CBC was largely unremarkable. CMP did show an LIT with a creatinine of 1.4. Fluids were started. Lactate was also elevated at 2.3. Lipase was within normal limits. CT of the abdomen pelvis did not show any acute cardiopulmonary processes. Urinalysis did not have any nitrites or leukocyte esterase. At this point there is not appear to be any emergent processes ongoing. Patient was informed the results and plan and is agreeable. Patient will be discharged with instructions to follow-up with her PCP for further evaluation and care. He also be given prescription for Zofran for his nausea. Patient discharged home.        Amount and/or Complexity of Data Reviewed  Clinical lab tests: reviewed  Tests in the radiology section of CPT®: reviewed  Decide to obtain previous medical records or to obtain history from someone other than the patient: yes                    --------------------------------------------- PAST HISTORY ---------------------------------------------  Past Medical History:  has a past medical history of Abdominal aortic aneurysm (AAA) 3.0 cm to 5.5 cm in diameter in male [facility]), Acute respiratory failure with hypoxia ([facility] COVID-19, CVA (cerebral vascular accident) ([facility] Dementia ([facility] Depression, Heart murmur, Hematemesis, RAF (obstructive sleep apnea), Osteoarthritis, Prostate cancer ([facility] Sepsis ([facility] and Stage 3 chronic kidney disease ([facility] Past Surgical History:  has a past surgical history that includes Prostate surgery; hernia repair; Cataract removal; Upper gastrointestinal endoscopy (N/A, 6/15/2019); and Pain management procedure (Bilateral, 3/8/2021). Social History:  reports that he quit smoking about 34 years ago. His smoking use included cigarettes. He started smoking about 62 years ago. He has a 34.00 pack-year smoking history. He has never used smokeless tobacco. He reports previous alcohol use. He reports that he does not use drugs. Family History: family history includes Crohn's Disease in his sister; Heart Disease in his father. The patients home medications have been reviewed.     Allergies: Oxycodone-acetaminophen and Vicodin [hydrocodone-acetaminophen]    -------------------------------------------------- RESULTS -------------------------------------------------  Labs:  Results for orders placed or performed during the hospital encounter of 09/26/21   CBC auto differential   Result Value Ref Range    WBC 8.7 4.5 - 11.5 E9/L    RBC 5.61 3.80 - 5.80 E12/L    Hemoglobin 17.4 (H) 12.5 - 16.5 g/dL    Hematocrit 52.7 37.0 - 54.0 %    MCV 93.9 80.0 - 99.9 fL    MCH 31.0 26.0 - 35.0 pg    MCHC 33.0 32.0 - 34.5 %    RDW 12.2 11.5 - 15.0 fL    Platelets 683 592 - 978 E9/L    MPV 9.7 7.0 - 12.0 fL    Neutrophils % 66.4 43.0 - 80.0 %    Immature Granulocytes % 0.3 0.0 - 5.0 %    Lymphocytes % 14.6 (L) 20.0 - 42.0 %    Monocytes % 16.7 (H) 2.0 - 12.0 %    Eosinophils % 1.9 0.0 - 6.0 %    Basophils % 0.1 0.0 - 2.0 %    Neutrophils Absolute 5.79 1.80 - 7.30 E9/L    Immature Granulocytes # 0.03 E9/L Lymphocytes Absolute 1.28 (L) 1.50 - 4.00 E9/L    Monocytes Absolute 1.46 (H) 0.10 - 0.95 E9/L    Eosinophils Absolute 0.17 0.05 - 0.50 E9/L    Basophils Absolute 0.01 0.00 - 0.20 E9/L   Comprehensive Metabolic Panel w/ Reflex to MG   Result Value Ref Range    Sodium 139 132 - 146 mmol/L    Potassium reflex Magnesium 5.0 3.5 - 5.0 mmol/L    Chloride 103 98 - 107 mmol/L    CO2 24 22 - 29 mmol/L    Anion Gap 12 7 - 16 mmol/L    Glucose 158 (H) 74 - 99 mg/dL    BUN 28 (H) 6 - 23 mg/dL    CREATININE 1.4 (H) 0.7 - 1.2 mg/dL    GFR Non-African American 49 >=60 mL/min/1.73    GFR African American 59     Calcium 9.0 8.6 - 10.2 mg/dL    Total Protein 7.0 6.4 - 8.3 g/dL    Albumin 4.3 3.5 - 5.2 g/dL    Total Bilirubin 0.8 0.0 - 1.2 mg/dL    Alkaline Phosphatase 136 (H) 40 - 129 U/L    ALT 11 0 - 40 U/L    AST 16 0 - 39 U/L   Lipase   Result Value Ref Range    Lipase 40 13 - 60 U/L   Lactate, Sepsis   Result Value Ref Range    Lactic Acid, Sepsis 2.3 (H) 0.5 - 1.9 mmol/L   Urinalysis with Microscopic   Result Value Ref Range    Color, UA Yellow Straw/Yellow    Clarity, UA Clear Clear    Glucose, Ur Negative Negative mg/dL    Bilirubin Urine Negative Negative    Ketones, Urine Negative Negative mg/dL    Specific Gravity, UA 1.025 1.005 - 1.030    Blood, Urine SMALL (A) Negative    pH, UA 5.5 5.0 - 9.0    Protein, UA Negative Negative mg/dL    Urobilinogen, Urine 0.2 <2.0 E.U./dL    Nitrite, Urine Negative Negative    Leukocyte Esterase, Urine Negative Negative    WBC, UA 1-3 0 - 5 /HPF    RBC, UA 5-10 (A) 0 - 2 /HPF    Bacteria, UA RARE (A) None Seen /HPF    Crystals, UA Rare (A) None Seen /HPF       Radiology:  CT ABDOMEN PELVIS W IV CONTRAST Additional Contrast? None   Final Result   No acute process in the abdomen and pelvis.             ------------------------- NURSING NOTES AND VITALS REVIEWED ---------------------------  Date / Time Roomed:  9/26/2021  4:37 PM  ED Bed Assignment:  [id number]    The nursing notes within the ED encounter and vital signs as below have been reviewed. /77   Pulse 78   Temp 98.7 °F (37.1 °C) (Oral)   Resp 16   Ht 5' 11\" (1.803 m)   Wt 177 lb 5 oz (80.4 kg)   SpO2 98%   BMI 24.73 kg/m²   Oxygen Saturation Interpretation: Normal      ------------------------------------------ PROGRESS NOTES ------------------------------------------  12:12 AM EDT  I have spoken with the patient and discussed todays results, in addition to providing specific details for the plan of care and counseling regarding the diagnosis and prognosis. Their questions are answered at this time and they are agreeable with the plan. I discussed at length with them reasons for immediate return here for re evaluation. They will followup with their primary care physician by calling their office tomorrow. --------------------------------- ADDITIONAL PROVIDER NOTES ---------------------------------  At this time the patient is without objective evidence of an acute process requiring hospitalization or inpatient management. They have remained hemodynamically stable throughout their entire ED visit and are stable for discharge with outpatient follow-up. The plan has been discussed in detail and they are aware of the specific conditions for emergent return, as well as the importance of follow-up. Discharge Medication List as of 9/26/2021  8:53 PM          Diagnosis:  1. Non-intractable vomiting with nausea, unspecified vomiting type    2. Generalized abdominal pain        Disposition:  Patient's disposition: Discharge to home  Patient's condition is stable.     Patient was seen and evaluated by both myself and Brandi Jha, DO  Resident  09/27/21 [id number]

## 2021-09-27 ENCOUNTER — TELEPHONE (OUTPATIENT)
Dept: NEUROLOGY | Age: 81
End: 2021-09-27

## 2021-10-09 ENCOUNTER — HOSPITAL ENCOUNTER (OUTPATIENT)
Dept: MRI IMAGING | Age: 81
Discharge: HOME | End: 2021-10-11
Payer: OTHER GOVERNMENT

## 2021-10-09 DIAGNOSIS — R29.90 STROKE-LIKE SYMPTOMS: ICD-10-CM

## 2021-10-09 DIAGNOSIS — R42 VERTIGO: ICD-10-CM

## 2021-10-09 PROCEDURE — 70551 MRI BRAIN STEM W/O DYE: CPT

## 2021-11-01 RX ORDER — RIVASTIGMINE TARTRATE 1.5 MG/1
1.5 CAPSULE ORAL 2 TIMES DAILY
Qty: 180 CAPSULE | Refills: 3 | Status: SHIPPED
Start: 2021-11-01 | End: 2022-01-10

## 2022-01-10 ENCOUNTER — OFFICE VISIT (OUTPATIENT)
Dept: NEUROLOGY | Age: 82
End: 2022-01-10
Payer: OTHER GOVERNMENT

## 2022-01-10 VITALS
DIASTOLIC BLOOD PRESSURE: 70 MMHG | HEART RATE: 83 BPM | WEIGHT: 177 LBS | BODY MASS INDEX: 24.78 KG/M2 | HEIGHT: 71 IN | SYSTOLIC BLOOD PRESSURE: 122 MMHG | OXYGEN SATURATION: 95 %

## 2022-01-10 DIAGNOSIS — M54.12 CERVICAL RADICULOPATHY: ICD-10-CM

## 2022-01-10 DIAGNOSIS — F02.81 ALZHEIMER'S DEMENTIA WITH BEHAVIORAL DISTURBANCE, UNSPECIFIED TIMING OF DEMENTIA ONSET: Primary | ICD-10-CM

## 2022-01-10 DIAGNOSIS — M54.17 L-S RADICULOPATHY: ICD-10-CM

## 2022-01-10 DIAGNOSIS — R26.89 BALANCE PROBLEM: ICD-10-CM

## 2022-01-10 DIAGNOSIS — G30.9 ALZHEIMER'S DEMENTIA WITH BEHAVIORAL DISTURBANCE, UNSPECIFIED TIMING OF DEMENTIA ONSET: Primary | ICD-10-CM

## 2022-01-10 DIAGNOSIS — M48.061 SPINAL STENOSIS OF LUMBAR REGION, UNSPECIFIED WHETHER NEUROGENIC CLAUDICATION PRESENT: ICD-10-CM

## 2022-01-10 PROCEDURE — G8484 FLU IMMUNIZE NO ADMIN: HCPCS | Performed by: NURSE PRACTITIONER

## 2022-01-10 PROCEDURE — 99214 OFFICE O/P EST MOD 30 MIN: CPT | Performed by: NURSE PRACTITIONER

## 2022-01-10 PROCEDURE — 4040F PNEUMOC VAC/ADMIN/RCVD: CPT | Performed by: NURSE PRACTITIONER

## 2022-01-10 PROCEDURE — 1036F TOBACCO NON-USER: CPT | Performed by: NURSE PRACTITIONER

## 2022-01-10 PROCEDURE — 1123F ACP DISCUSS/DSCN MKR DOCD: CPT | Performed by: NURSE PRACTITIONER

## 2022-01-10 PROCEDURE — G8420 CALC BMI NORM PARAMETERS: HCPCS | Performed by: NURSE PRACTITIONER

## 2022-01-10 PROCEDURE — G8427 DOCREV CUR MEDS BY ELIG CLIN: HCPCS | Performed by: NURSE PRACTITIONER

## 2022-01-10 RX ORDER — RIVASTIGMINE TARTRATE 1.5 MG/1
1.5 CAPSULE ORAL DAILY
Qty: 7 CAPSULE | Refills: 0 | Status: SHIPPED
Start: 2022-01-10 | End: 2022-03-30 | Stop reason: SDUPTHER

## 2022-01-10 RX ORDER — SERTRALINE HYDROCHLORIDE 25 MG/1
25 TABLET, FILM COATED ORAL DAILY
Qty: 90 TABLET | Refills: 3 | Status: SHIPPED
Start: 2022-01-10 | End: 2022-04-04

## 2022-01-10 NOTE — Clinical Note
I ordered a neurosurgery evaluation after they left the visit--for his low back and walking troubles.  Please call them and let them know that I referred

## 2022-01-10 NOTE — PROGRESS NOTES
[address]. Xochilt Bauer M.D., F.A.C.P. Shantel Sherman, ANTHONY, APRN, CNS  Reyna Carbajal. Maria D Martins, MSN, APRN-FNP-C  Soni Howell MSN, APRN, FNP-C  Manish YANCEY, PA-C  [address] MSN, APRN, FNP-C  [address], [place] [address]  [place], [address]  Phone: [phone]  Fax: [phone]         Evie Graham is a 79 y.o. right handed man    We are following him for Alzheimer's dementia, mild peripheral neuropathy, and LS and cervical radiculopathy    He presents with his wife today--he is a a fair historian and his wife is a good one    Repeat MRI of the brain showed no intracranial abnormalities to explain his positional vertigo. When he sits up in bed he feels lightheaded and has to sit for a minute or two before walking. He did suffer one fall in his bedroom with no major injuries. His family has now adjusted his home so that he does not have to climb stairs. He feels off balance and wobbly when he is walking ,but no rotational sensations. He continues to have a somewhat shuffling and stiff gait and his wife is concerned about Parkinson's again. The patient did not attend PT because his wife was recently diagnosed with lung cancer, and they could not make the appointments at that time. He denies any cauda equina symptoms or major neck or back pain. He is no longer following with pain management. He is not currently using an assistive device    His memory continues to decline despite memantine and rivastigmine, but thankfully he is no longer driving. He was unable to tolerate 50 mg of Zoloft due to fatigue, but is taking 25 mg daily. No behavioral issues. Sleep is fair. He and his wife do feel that his mood is somewhat stabilized on the medicine. Unfortunately, their son passed away from [address] [facility] in October.  No other new medical concerns today    No chest pain or palpitations  No SOB  No loss of consciousness  No incontinence of bowels or bladder  No itching or bruising appreciated  No numbness, tingling or focal arm/leg weakness  No speech or swallowing troubles    ROS otherwise negative     Current Outpatient Medications   Medication Sig Dispense Refill    rivastigmine (EXELON) 1.5 MG capsule Take 1 capsule by mouth 2 times daily 180 capsule 3    ondansetron (ZOFRAN) 4 MG tablet Take 1 tablet by mouth every 12 hours as needed for Nausea or Vomiting 60 tablet 0    sertraline (ZOLOFT) 50 MG tablet Take 0.5 tablets by mouth daily for 14 days, THEN 1 tablet daily. 90 tablet 3    memantine (NAMENDA) 10 MG tablet TAKE 1 TABLET TWICE A  tablet 3    Multiple Vitamins-Minerals (THERAPEUTIC MULTIVITAMIN-MINERALS) tablet Take 1 tablet by mouth every morning Last dose 3-1-21      vitamin E 400 UNIT capsule Take 400 Units by mouth every morning Last dose 3-1-21      pantoprazole (PROTONIX) 40 MG tablet Take 40 mg by mouth every morning       atorvastatin (LIPITOR) 40 MG tablet Take 40 mg by mouth every morning       amLODIPine (NORVASC) 10 MG tablet Take 10 mg by mouth every morning       vitamin B-12 (CYANOCOBALAMIN) 500 MCG tablet Take 500 mcg by mouth every morning       aspirin 81 MG chewable tablet Take 1 tablet by mouth daily (Patient taking differently: Take 81 mg by mouth every morning )      diazepam (VALIUM) 5 MG tablet Take 5 mg by mouth every morning. No current facility-administered medications for this visit.      Objective:     /70 (Site: Right Upper Arm, Position: Sitting, Cuff Size: Medium Adult)   Pulse 83   Ht 5' 11\" (1.803 m)   Wt 177 lb (80.3 kg)   SpO2 95%   BMI 24.69 kg/m²     General appearance: alert, appears stated age, cooperative--again appears depressed  Head: normocephalic/atraumatic  Eyes: sclerae, conjunctivae/corneas clear no drainage  Neck: limited ROM with no cogwheeling  Lungs: clear to auscultation bilaterally; resps nonlabored  Heart: regular rate and rhythm---no murmur  Extremities: no edema  Pulses: 2+ and symmetric  Skin: no abrasions or rashes; dry skin BLE    Mental Status: alert and oriented x 4---tearful again today    Appropriate attention/concentration  Evidence of short-term memory issues  Able to perform simple and complex commands well    Speech: no dysarthria  Language: no aphasias    Cranial Nerves:  I: smell    II: visual acuity     II: visual fields Full    II: pupils ARNOLD   III,VII: ptosis None     III,IV,VI: extraocular muscles  EOMI with no nystagmus   V: mastication Normal   V: facial light touch sensation  Normal   V,VII: corneal reflex     VII: facial muscle function - upper  Normal   VII: facial muscle function - lower Normal today   VIII: hearing Decreased R---wears aids   IX: soft palate elevation  Normal   IX,X: gag reflex    XI: trapezius strength  5/5   XI: sternocleidomastoid strength 5/5   XI: neck extension strength  5/5   XII: tongue strength  Normal     Motor:  5/5 throughout  No drift  Spastic legs; normal bulk  No abnormal movements    Sensory:  LT normal in all limbs  Vib impaired both ankles    Coordination:   FN and FFM intact b/l    Gait:  Spastic, wider based steppage gait with some shuffling  Normal arm swinging  No festination; turns en bloc    DTR:   Right Brachioradialis reflex 1+  Left Brachioradialis reflex 1+  Right Biceps reflex 1+  Left Biceps reflex 1+  Right Triceps reflex 1+  Left Triceps reflex 1+  Right Quadriceps reflex 1+  Left Quadriceps reflex 1+  Right Achilles reflex 0  Left Achilles reflex 0    No Rodriguez's  No grasps, suck, or other pathological reflexes    Laboratory/Radiology:  ry/Radiology:     MRI brain Oct 2021: old L occipital stroke; no acute events    All labs and images personally reviewed today    Assessment:     Probable Alzheimer's dementia: His memory continues to decline despite dual memory agents and, in light of his persistent lightheadedness, reducing his medications may help in case he is having side effects.   This will also reduce his polypharmacy. At this time, he is not describing positional vertigo and his updated MRI of the brain showed no new events. His recent stressors related to his son's death and his wife's cancer are contributing to his worsening memory decline. Some of his gait issues are likely due to his cognitive decline. LS radiculopathy: crowding of cauda equina on last MRI. I suspect this may be causing his gait instability and he would benefit from PT and surgical eval.     Cervical stenosis with radiculopathy: at C3 down to C7 but no major cord compression on last MRI.      Severe L CTS    AAA    Hx remote occipital stroke: on ASA/statin    Plan:     Reordered PT [facility] (he needs a ride there)    Refer to neurosurgery    Wean Exelon; continue Namenda 10 mg BID for now    Fall precautions reviewed--recommend family supervise medication    Much emotional support provided to him and his wife    RTO in 6 months or sooner LIZETH Cassidy - CNP  1:41 PM  1/10/2022

## 2022-01-11 ENCOUNTER — TELEPHONE (OUTPATIENT)
Dept: NEUROLOGY | Age: 82
End: 2022-01-11

## 2022-01-11 PROBLEM — R42 VERTIGO: Status: RESOLVED | Noted: 2021-09-24 | Resolved: 2022-01-11

## 2022-01-11 NOTE — TELEPHONE ENCOUNTER
----- Message from LIZETH Grimes CNP sent at 1/11/2022 10:02 AM EST -----  I ordered a neurosurgery evaluation after they left the visit--for his low back and walking troubles.  Please call them and let them know that I referred

## 2022-01-11 NOTE — TELEPHONE ENCOUNTER
I spoke with Audelia Manuel and relayed to her that a referral was placed to Neurosurgery.     Electronically signed by Amy Youssef MA on 1/11/2022 at 10:44 AM

## 2022-01-11 NOTE — TELEPHONE ENCOUNTER
I relayed message to patient, then realized he has dementia. I asked to speak to his wife and the phone hung up. I tried calling back several times.  The phone rings and then goes to a busy signal.      Electronically signed by Jayla Armenta MA on 1/11/2022 at 10:28 AM

## 2022-01-20 ENCOUNTER — HOSPITAL ENCOUNTER (OUTPATIENT)
Dept: PHYSICAL THERAPY | Age: 82
Setting detail: THERAPIES SERIES
Discharge: HOME | End: 2022-01-20
Payer: MEDICARE

## 2022-01-20 PROCEDURE — 97161 PT EVAL LOW COMPLEX 20 MIN: CPT | Performed by: PHYSICAL THERAPIST

## 2022-01-20 NOTE — PROGRESS NOTES
Physical Therapy  Initial Assessment  Date: 2022  Patient Name: Willam Ritter  MRN: [id number]  : 1940          Restrictions       Subjective   General  Chart Reviewed: Yes  Referring Practitioner: John Valentine  Diagnosis: M54.17 (ICD-10-CM) - L-S umtsavhoryfetB32.89 (ICD-10-CM) - Balance fdmkbxdA83.12 (ICD-10-CM) - Cervical [id number] (ICD-10-CM) - Spinal stenosis of lumbar region, unspecified whether neurogenic claudication present  Follows Commands: Within Functional Limits  PT Visit Information  PT Insurance Information: Medicare  cert dates:  to 22  Total # of Visits to Date: 1  Subjective  Subjective: Patient presents to PT with c/o generalized balance loss. States having issues with ambulation with occasional shuffled gait . Patient ambulates with/wihout AD. Will use FWW for prolonged distances. Patient admits to occasional falls with last fall approx 1 month ago. Patient reports not having any significant pain. No strength loss. Pt admits to LUE numbness.   Pain Screening  Patient Currently in Pain: Denies  Vital Signs  Patient Currently in Pain: Denies    Objective     Observation/Palpation  Posture: Fair (fwd head posturing)  Palpation: pain with palpation across L cervical region  Edema: no obvious edema    AROM RLE (degrees)  RLE AROM: WFL  AROM LLE (degrees)  LLE AROM : WFL  AROM RUE (degrees)  RUE AROM : WFL  AROM LUE (degrees)  LUE AROM : WFL  Spine  Cervical: limited 50% all planes    Strength RLE  Strength RLE: WFL  Strength LLE  Strength LLE: WFL  Strength RUE  Strength RUE: WFL  Strength LUE  Comment: L shoulder/elbow- grossly 4-/5  Strength Other  Other: cervical- grossly 4-/5        Sensation  Overall Sensation Status: Impaired  Light Touch: Partial deficits in the LUE             Ambulation  Ambulation?: Yes  Ambulation 1  Surface: level tile  Device: No Device  Assistance: Independent  Quality of Gait: pt ambulates without AD; decreased step length/height noted with flat foot mechanics throughout  Balance  Sitting - Static: Good  Sitting - Dynamic: Good  Standing - Static: Good;-  Standing - Dynamic: Good;-     Assessment   Conditions Requiring Skilled Therapeutic Intervention  Body structures, Functions, Activity limitations: Decreased functional mobility ; Decreased ROM; Decreased strength;Decreased posture  Assessment: Patient presents to PT with c/o unsteady gait; limited balance noted with decreased step length/height withotu AD; limited cervical ROM/strength noted with radicular symptoms across LUE  Prognosis: Fair;Good  Decision Making: Low Complexity  REQUIRES PT FOLLOW UP: Yes         Plan   Plan  Times per week: 2x/week x 5 weeks  Current Treatment Recommendations: Strengthening,ROM,Balance Training,Functional Mobility Training,Gait Training,Neuromuscular Re-education,Manual Therapy - Soft Tissue Mobilization,Pain Management,Home Exercise Program,Safety Education & Training,Patient/Caregiver Education & Training    G-Code       OutComes Score  Balance Score: 13 (01/20/22 1504)  Gait Score: 8 (01/20/22 1504)        Tinetti Total Score: 21 (01/20/22 1504)    Goals  Short term goals  Time Frame for Short term goals: 3 weeks  Short term goal 1: increase AROM cervical region to within 75% functional limits  Short term goal 2: increase strength of cervical region to grossly 4/5 improving upright posture to [employer] term goal 3: increase strength of LUE to grossly 4/5 all planes  Short term goal 4: increase ambulation with pt demonstrating heel-toe progression with/without use of AD  Short term goal 5: increase balance to > 21/28 Tinetti to improve pt safety  Long term goals  Time Frame for Long term goals : 5 weeks  Long term goal 1: increase AROM cervical region to within 90% functional limits  Long term goal 2: increase strength of cervical region to grossly 4+/5 improving upright posture to GOOD-  Long term goal 3: increase strength of LUE to grossly 4+/5 all planes  Long term goal 4: increase ambulation with pt demonstrating proper heel-toe gait  with/without use of AD  Long term goal 5: increase balance to > 22/28 Tinetti to improve pt safety  Long term goal 6: pt demonstrates independence with HEP  Patient Goals   Patient goals : to improve overall function       Therapy Time   Individual Concurrent Group Co-treatment   Time In 1345         Time Out 1415         Minutes [address]   T: [phone]   F: [phone]     If you have any questions or concerns, please don't hesitate to call. Thank you for your referral.    Physician Signature:________________________________Date:__________________  By signing above, therapists plan is approved by physician. All patients under [facility]   must be signed by physician.

## 2022-01-25 ENCOUNTER — HOSPITAL ENCOUNTER (OUTPATIENT)
Dept: PHYSICAL THERAPY | Age: 82
Setting detail: THERAPIES SERIES
Discharge: HOME | End: 2022-01-25
Payer: MEDICARE

## 2022-01-25 PROCEDURE — 97110 THERAPEUTIC EXERCISES: CPT

## 2022-01-25 PROCEDURE — 97116 GAIT TRAINING THERAPY: CPT

## 2022-01-25 NOTE — PROGRESS NOTES
[facility]  Phone: [phone] Fax: [phone]       Physical Therapy Daily Treatment Note  Date:  2022    Patient Name:  Dejuan Medel    :  1940  MRN: [id number]    Restrictions/Precautions:    Diagnosis:  LS Radiculopathy, Balance Problem, Cervical Radiculopathy, Spinal Stenosis, Unspecified Neurogenic Claudication Present  Treatment Diagnosis:    Insurance/Certification information:  Medicare (cert [id number] to 47)  Referring Practitioner: Kaz Sampson  care signed (Y/N):    Visit# / total visits:  2/10 (5 weeks)  Pain level: /10  Time In: 10:15       Time Out: 11:15         Subjective:  Pt states he feels his mobility is unstable. Denies any falls. Pt comes to therapy amb without AD w/fwd flexed posture. Exercises:  Exercise/Equipment Resistance/Repetitions Other comments   Bike    5 min    Calf stretch  10\" 5 x @ incline   Toe raises 10 x    Marching w/2 sec hold 10 x B w/decr UE support 2HH to [facility]   Step ups fwd 6\" 10 x B    Side stepping  3 laps Finger touch on bar        Discs  nt           Sit to stand from box nt            Stepping over  nt                                                       Other Therapeutic Activities: Instructed pt in heel toe mechanics to decrease shuffle gait. Practiced gait in //bars using mirror for feedback x 5 laps. Cued posture. Instructed pt in proper sequence for amb using SPC. Ambulated 220' using SPC and focus on heel toe mechanics w/SBA. Ambulated 220' using [employer] and focus on heel toe mechanics. Trial of SBQC for amb x 100' w/SBA. Ambulated with patient to front entrance using ww. Close SBA on snowy sidewalk and sidewalk access.      Home Exercise Program:      Manual Treatments:      Modalities:      Timed Code Treatment Minutes:  60    Total Treatment Minutes:  60    Treatment/Activity Tolerance:  [x] Patient tolerated treatment well [] Patient limited by fatigue  [] Patient limited by pain  [] Patient limited by other medical complications  [x] Other: Pt demonstrated good understanding of proper gait mechanics using heel toe sequence. Good carry over with increased awareness. Pt demonstrated safe mechanics using ww for functional distances. Improvement in posture with device. Plan:   [x] Continue per plan of care [] Alter current plan (see comments)  [] Plan of care initiated [] Hold pending MD visit [] Discharge  Plan for Next Session:         Treatment Charges: Mins Units   Initial Evaluation     Re-Evaluation     Ther Exercise         TE 20 1   Manual Therapy     MT     Ther Activities        TA     Gait Training          GT 40 2   Neuro Re-education NR     Modalities     Non-Billable Service Time     Other     Total Time/Units 60 3     Electronically signed by:   Torri Brower

## 2022-01-27 ENCOUNTER — HOSPITAL ENCOUNTER (OUTPATIENT)
Dept: PHYSICAL THERAPY | Age: 82
Setting detail: THERAPIES SERIES
Discharge: HOME | End: 2022-01-27
Payer: MEDICARE

## 2022-01-27 PROCEDURE — 97110 THERAPEUTIC EXERCISES: CPT

## 2022-01-27 PROCEDURE — 97530 THERAPEUTIC ACTIVITIES: CPT

## 2022-01-27 NOTE — PROGRESS NOTES
[facility]  Phone: [phone] Fax: [phone]       Physical Therapy Daily Treatment Note  Date:  2022    Patient Name:  Brandi Grimaldo    :  1940  MRN: [id number]    Restrictions/Precautions:    Diagnosis:  LS Radiculopathy, Balance Problem, Cervical Radiculopathy, Spinal Stenosis, Unspecified Neurogenic Claudication Present  Treatment Diagnosis:    Insurance/Certification information:  Medicare (cert 6805 to )  Referring Practitioner: Chuck Gonzalez of care signed (Y/N):    Visit# / total visits:  3/10 (5 weeks)  Pain level: /10  Time In: 10:00       Time Out: 11:00         Subjective:  Pt ambulates into clinic using his SPC. States he has been practicing heel toe mechanics at home. Exercises:  Exercise/Equipment Resistance/Repetitions Other comments   Bike    12 min    Calf stretch  10\" 5 x @ incline   Toe raises 10 x    Marching w/2 sec hold 10 x B w/decr UE support 2HH to [facility]   Step ups fwd 6\" 10 x B    Side stepping  3 laps Finger touch on bar        Discs marching  10 x B           Static stand  15\" x 3 CGA   Sit to stand from box 24\" 10 x            Stepping over bolsters In //bars w/2HH>1HH       Outside bars w/SPC +CGA                                                Other Therapeutic Activities:    Ambulated 220' x 2  using SPC and focus on heel toe mechanics w/SBA. Home Exercise Program:      Manual Treatments:      Modalities:      Timed Code Treatment Minutes:  60    Total Treatment Minutes:  60    Treatment/Activity Tolerance:  [x] Patient tolerated treatment well [] Patient limited by fatigue  [] Patient limited by pain  [] Patient limited by other medical complications  [x] Other: Good carry over of heel toe mechanics. Pt exhibits wide FRANCISCO amb w/SPC with good balance. Good tolerance for addition of balance activities today.      Plan:   [x] Continue per plan of care [] Alter current plan (see comments)  [] Plan of care initiated [] Hold pending MD visit [] Discharge  Plan for Next Session:         Treatment Charges: Mins Units   Initial Evaluation     Re-Evaluation     Ther Exercise         TE 20 1   Manual Therapy     MT     Ther Activities        TA 40 2   Gait Training          GT     Neuro Re-education NR     Modalities     Non-Billable Service Time     Other     Total Time/Units 60 3     Electronically signed by:   Torri Brower

## 2022-02-01 ENCOUNTER — HOSPITAL ENCOUNTER (OUTPATIENT)
Dept: PHYSICAL THERAPY | Age: 82
Setting detail: THERAPIES SERIES
Discharge: HOME | End: 2022-02-01
Payer: MEDICARE

## 2022-02-01 PROCEDURE — 97110 THERAPEUTIC EXERCISES: CPT

## 2022-02-01 PROCEDURE — 97530 THERAPEUTIC ACTIVITIES: CPT

## 2022-02-01 NOTE — PROGRESS NOTES
[facility]  Phone: [phone] Fax: [phone]       Physical Therapy Daily Treatment Note  Date:  2022    Patient Name:  Davonte Govea    :  1940  MRN: [id number]    Restrictions/Precautions:    Diagnosis:  LS Radiculopathy, Balance Problem, Cervical Radiculopathy, Spinal Stenosis, Unspecified Neurogenic Claudication Present  Treatment Diagnosis:    Insurance/Certification information:  Medicare (cert [phone] to 4/21/15)  Referring Practitioner: Arnulfo Manriquez of care signed (Y/N):    Visit# / total visits:  4/10 (5 weeks)  Pain level: /10  Time In: 9:30       Time Out: 10:30         Subjective:  Pt ambulates into clinic using his SPC. Pt voices he feels his gait is better when he uses his walker. Exercises:  Exercise/Equipment Resistance/Repetitions Other comments   Bike    10 min         Sit to stand from box 24\" 10 x Seated lumbar flex/rotation   10 x/7 x each    Cervical rotation 3\" 5 x B    Doorway stretch 10\" 5 x    Shoulder shrugs  10 x    Scapular retraction 10 x    Hamstring stretch   10\" 5 x B sitting     Other Therapeutic Activities:    Ambulated 220' x 1 using SPC, ambulated 2 laps (440') using ww. Focus on heel toe mechanics w/SBA.     Home Exercise Program:  (22) Outlined written HEP  Seated Cervical Rotation AROM - 1 x daily - 10 reps  Doorway Pec Stretch at 60 Elevation - 1 x daily - 5 reps - 10 hold  Seated Shoulder Shrugs - 1 x daily - 10 reps  Seated Scapular Retraction - 1 x daily - 10 reps  Side Stepping with Counter Support - 1 x daily - 5 reps  Seated Hamstring Stretch - 1 x daily - 5 reps - 10 hold    Manual Treatments:      Modalities:      Timed Code Treatment Minutes:  60    Total Treatment Minutes:  60    Treatment/Activity Tolerance:  [x] Patient tolerated treatment well [] Patient limited by fatigue  [] Patient limited by pain  [] Patient limited by other medical complications  [x] Other: Today's session focused on instruction in cervical and trunk ROM/flexibility exercises. Outlined written HEP and reviewed with pt who expressed good understanding. Ambulation as noted with good awareness of heel toe mechanics. Pt expresses he is more comfortable using ww and he demonstrates better gait mechanics than when he uses SPC. Advised to use ww outside the home. Plan:   [x] Continue per plan of care [] Alter current plan (see comments)  [] Plan of care initiated [] Hold pending MD visit [] Discharge  Plan for Next Session:         Treatment Charges: Mins Units   Initial Evaluation     Re-Evaluation     Ther Exercise         TE 35 2   Manual Therapy     MT     Ther Activities        TA 20 1   Gait Training          GT     Neuro Re-education NR     Modalities     Non-Billable Service Time     Other 5    Total Time/Units 60 3     Electronically signed by:   Torri Brower

## 2022-02-03 ENCOUNTER — HOSPITAL ENCOUNTER (OUTPATIENT)
Dept: PHYSICAL THERAPY | Age: 82
Setting detail: THERAPIES SERIES
Discharge: HOME | End: 2022-02-03
Payer: MEDICARE

## 2022-02-03 NOTE — FLOWSHEET NOTE
[facility]  Phone: [phone] Fax: [phone]     Physical Therapy  Cancellation/No-show Note  Patient Name:  Myrna Kim  :  1940   Date:  2/3/2022    For today's appointment patient:  [x]  Cancelled  []  Rescheduled appointment  []  No-show     Reason given by patient:  []  Patient ill  []  Conflicting appointment  []  No transportation    []  Conflict with work  []  No reason given  [x]  Other:     Comments:  Weather    Electronically signed by:   Guille Rogers

## 2022-02-08 ENCOUNTER — HOSPITAL ENCOUNTER (OUTPATIENT)
Dept: PHYSICAL THERAPY | Age: 82
Setting detail: THERAPIES SERIES
Discharge: HOME | End: 2022-02-08
Payer: MEDICARE

## 2022-02-08 PROCEDURE — 97110 THERAPEUTIC EXERCISES: CPT

## 2022-02-08 PROCEDURE — 97530 THERAPEUTIC ACTIVITIES: CPT

## 2022-02-08 NOTE — PROGRESS NOTES
[facility]  Phone: [phone] Fax: [phone]       Physical Therapy Daily Treatment Note  Date:  2022    Patient Name:  Peace Leahy    :  1940  MRN: [id number]    Restrictions/Precautions:    Diagnosis:  LS Radiculopathy, Balance Problem, Cervical Radiculopathy, Spinal Stenosis, Unspecified Neurogenic Claudication Present  Treatment Diagnosis:    Insurance/Certification information:  Medicare (cert  to )  Referring Practitioner: Helen Lo of care signed (Y/N):    Visit# / total visits:  5/10 (5 weeks)  Pain level: /10  Time In: 10:05       Time Out: 11:05         Subjective:  Pt ambulates into clinic using his SPC. Pt again voices he feels his gait is better when he uses his walker. He is concerned about damage to the floors with indoor use. Provided info on walker \"skis\" for rear legs. Pt c/o still feeling like his walking is not stable. Pt also reports he misplaced his written HEP. Reprinted and given to pt this morning. Exercises:  Exercise/Equipment Resistance/Repetitions Other comments   Bike    10 min    Toe raises/heel raises 10 x each Step ups/over  fwd 6\" 5 x  Over/back =1          lateral 6\" 5 x Over/back=1   Side stepping nt    Sit to stand from box 24\" 10 x    Stepping over bolsters In //bars w/2HH>1HH                    sitting     Other Therapeutic Activities: Fwd march, Bckwd walkng 25' x 3   Ambulated 220' x 1 using SPC, ambulated 220' x 2 using ww. Focus on heel toe mechanics w/SBA.     Home Exercise Program:  (22) Outlined written HEP  Seated Cervical Rotation AROM - 1 x daily - 10 reps  Doorway Pec Stretch at 60 Elevation - 1 x daily - 5 reps - 10 hold  Seated Shoulder Shrugs - 1 x daily - 10 reps  Seated Scapular Retraction - 1 x daily - 10 reps  Side Stepping with Counter Support - 1 x daily - 5 reps  Seated Hamstring Stretch - 1 x daily - 5 reps - 10 hold    Manual Treatments:      Modalities:      Timed Code Treatment Minutes:  60    Total Treatment Minutes:  60    Treatment/Activity Tolerance:  [x] Patient tolerated treatment well [] Patient limited by fatigue  [] Patient limited by pain  [] Patient limited by other medical complications  [x] Other: Again advised pt to use his walker for all ambulation as he feels safer and demonstrates better mechanics/safer gait. Advised pt to avoid throw rugs in the house. Plan:   [x] Continue per plan of care [] Alter current plan (see comments)  [] Plan of care initiated [] Hold pending MD visit [] Discharge  Plan for Next Session:         Treatment Charges: Mins Units   Initial Evaluation     Re-Evaluation     Ther Exercise         TE 30 2   Manual Therapy     MT     Ther Activities        TA 25 1   Gait Training          GT     Neuro Re-education NR     Modalities     Non-Billable Service Time     Other 5    Total Time/Units 60 3     Electronically signed by:   Torri Brower

## 2022-02-10 ENCOUNTER — HOSPITAL ENCOUNTER (OUTPATIENT)
Dept: PHYSICAL THERAPY | Age: 82
Setting detail: THERAPIES SERIES
Discharge: HOME | End: 2022-02-10
Payer: MEDICARE

## 2022-02-10 PROCEDURE — 97530 THERAPEUTIC ACTIVITIES: CPT

## 2022-02-10 PROCEDURE — 97112 NEUROMUSCULAR REEDUCATION: CPT

## 2022-02-10 PROCEDURE — 97110 THERAPEUTIC EXERCISES: CPT

## 2022-02-10 NOTE — PROGRESS NOTES
[facility]  Phone: [phone] Fax: [phone]       Physical Therapy Daily Treatment Note  Date:  2/10/2022    Patient Name:  Luciano Soriano    :  1940  MRN: [id number]    Restrictions/Precautions:    Diagnosis:  LS Radiculopathy, Balance Problem, Cervical Radiculopathy, Spinal Stenosis, Unspecified Neurogenic Claudication Present  Treatment Diagnosis:    Insurance/Certification information:  Medicare (cert [id number] to )  Referring Practitioner: Charlie Jimenez of care signed (Y/N):    Visit# / total visits:  6/10 (5 weeks)  Pain level: /10  Time In: 9:45       Time Out: 11:00         Subjective:  Pt ambulates into clinic using his SPC. Pt states he wakes at night with pain in LUE which resolves when he is up. He also reports intermittent tingling into his left hand. Advised pt to call physician and report these symptoms. Pt voiced understanding. Exercises:  Exercise/Equipment Resistance/Repetitions Other comments   Bike    10 min    Calf stretch  10\" 5 x @ incline   Toe raises/ 15 x each Step ups/over  fwd 6\" 10 x  Over/back =1          lateral 6\" 10 x Over/back=1   Side stepping 10\" B x 3 Outside //bars   Sit to stand from box 24\" 10 x           Cervical rotation 3\" 5 x B       Shoulder shrugs  10 x    Scapular retraction 10 x    sitting     Other Therapeutic Activities: Fwd march, Bckwd walkng 25' x 2  Standing balance: ball transfer in front with UE's @90* 10 x                                Ball transfer w/upper trunk rotation with UE's @90* 10 x    Ambulated 220' x 3 using ww. Focus on heel toe mechanics w/SBA. Ambulated between equipment making close left and right turns with ww and close SBA. Ambulated with pt to front entrance, down decline in sidewalk to his transportation using SPC and SBA.     Home Exercise Program:  (22) Outlined written HEP  Seated Cervical Rotation AROM - 1 x daily - 10 reps  Doorway Pec Stretch at 60 Elevation - 1 x daily - 5 reps - 10 hold  Seated Shoulder Shrugs - 1 x daily - 10 reps  Seated Scapular Retraction - 1 x daily - 10 reps  Side Stepping with Counter Support - 1 x daily - 5 reps  Seated Hamstring Stretch - 1 x daily - 5 reps - 10 hold    Manual Treatments:      Modalities:      Timed Code Treatment Minutes:  65    Total Treatment Minutes:  75    Treatment/Activity Tolerance:  [x] Patient tolerated treatment well [] Patient limited by fatigue  [] Patient limited by pain  [] Patient limited by other medical complications  [x] Other: Pt noted tingling in left hand after having LUE fwd on bike handle today. This resolved. Reminded pt to notify physician. Otherwise good tolerance for all activities. Increased endurance for ambulation. Plan:   [x] Continue per plan of care [] Alter current plan (see comments)  [] Plan of care initiated [] Hold pending MD visit [] Discharge  Plan for Next Session:         Treatment Charges: Mins Units   Initial Evaluation     Re-Evaluation     Ther Exercise         TE 30 2   Manual Therapy     MT     Ther Activities        TA 20 1   Gait Training          GT     Neuro Re-education NR 15 1   Modalities     Non-Billable Service Time     Other 10    Total Time/Units 75 4     Electronically signed by:   Torri Brower

## 2022-02-17 ENCOUNTER — HOSPITAL ENCOUNTER (OUTPATIENT)
Dept: PHYSICAL THERAPY | Age: 82
Setting detail: THERAPIES SERIES
Discharge: HOME | End: 2022-02-17
Payer: MEDICARE

## 2022-02-17 NOTE — FLOWSHEET NOTE
[facility]  Phone: [phone] Fax: [phone]     Physical Therapy  Cancellation/No-show Note  Patient Name:  Dyllan Rivero  :  1940   Date:  2022    For today's appointment patient:  [x]  Cancelled  []  Rescheduled appointment  []  No-show     Reason given by patient:  []  Patient ill  []  Conflicting appointment  [x]  No transportation    []  Conflict with work  []  No reason given  []  Other:     Comments:  Transport did not pick pt up today. Next scheduled appointment is  @2:00. Electronically signed by:   Guille Rogers

## 2022-02-22 ENCOUNTER — HOSPITAL ENCOUNTER (OUTPATIENT)
Dept: PHYSICAL THERAPY | Age: 82
Setting detail: THERAPIES SERIES
Discharge: HOME | End: 2022-02-22
Payer: MEDICARE

## 2022-02-22 PROCEDURE — 97530 THERAPEUTIC ACTIVITIES: CPT

## 2022-02-22 PROCEDURE — 97110 THERAPEUTIC EXERCISES: CPT

## 2022-02-22 NOTE — PROGRESS NOTES
[facility]  Phone: [phone] Fax: [phone]       Physical Therapy Daily Treatment Note  Date:  2022    Patient Name:  Willam Ritter    :  1940  MRN: [id number]    Restrictions/Precautions:    Diagnosis:  LS Radiculopathy, Balance Problem, Cervical Radiculopathy, Spinal Stenosis, Unspecified Neurogenic Claudication Present  Treatment Diagnosis:    Insurance/Certification information:  Medicare (cert [id number] to )  Referring Practitioner: Alcon Bush of care signed (Y/N):    Visit# / total visits:  7/10 (5 weeks)  Pain level: /10  Time In: 9:50       Time Out: 10:40         Subjective:  Pt states he worked outside for 4-5 hours yesterday cutting up a tree with a small chain saw. Reports he rested periodically and had no difficulty with balance. Exercises:  Exercise/Equipment Resistance/Repetitions Other comments   Bike    10 min    Calf stretch  10\" 5 x                       nt @ incline   Toe raises/ 15 x each                  nt Step ups/over  fwd 6\" 10 x                      nt Over/back =1          lateral 6\" 10 x                      nt  Over/back=1   Side stepping 10\" B x 3                  nt Outside //bars   Sit to stand from box 24\" 10 x                       sitting     Other Therapeutic Activities: Standing balance: Feet together for narrow FRANCISCO- ball transfer overhead 10 x                                Tandem standing (modified) ball transfer fwd with UE's @90* 10 x     Ambulated into [place] using SPC, negotiated 24 steps to second floor using 1 rail and SPC and reciprocal pattern to ascend, step to pattern to descend. Total time 17 min with 2 (2min) seated rest breaks.         Home Exercise Program:  (22) Outlined written HEP  Seated Cervical Rotation AROM - 1 x daily - 10 reps  Doorway Pec Stretch at 60 Elevation - 1 x daily - 5 reps - 10 hold  Seated Shoulder Shrugs - 1 x daily - 10 reps  Seated Scapular Retraction - 1 x daily - 10 reps  Side Stepping with Counter Support - 1 x daily - 5 reps  Seated Hamstring Stretch - 1 x daily - 5 reps - 10 hold    (2/22/22) Instructed pt in step ups on bottom step of stairs for home. Manual Treatments:      Modalities:      Timed Code Treatment Minutes:  50    Total Treatment Minutes:  50    Treatment/Activity Tolerance:  [x] Patient tolerated treatment well [] Patient limited by fatigue  [] Patient limited by pain  [] Patient limited by other medical complications  [x] Other:Good tolerance for distance ambulation with seated rest breaks. Pt is reporting improvement in balance allowing him to perform ADL's. Plan:   [x] Continue per plan of care [] Alter current plan (see comments)  [] Plan of care initiated [] Hold pending MD visit [] Discharge  Plan for Next Session:         Treatment Charges: Mins Units   Initial Evaluation     Re-Evaluation     Ther Exercise         TE 15 1   Manual Therapy     MT     Ther Activities        TA 30 2   Gait Training          GT     Neuro Re-education NR 5 -   Modalities     Non-Billable Service Time     Other     Total Time/Units 50 3     Electronically signed by:   Torri 49

## 2022-02-24 ENCOUNTER — HOSPITAL ENCOUNTER (OUTPATIENT)
Dept: PHYSICAL THERAPY | Age: 82
Setting detail: THERAPIES SERIES
Discharge: HOME | End: 2022-02-24
Payer: MEDICARE

## 2022-02-24 DIAGNOSIS — R41.841 COGNITIVE COMMUNICATION DEFICIT: ICD-10-CM

## 2022-02-24 PROCEDURE — 97110 THERAPEUTIC EXERCISES: CPT

## 2022-02-24 PROCEDURE — 97530 THERAPEUTIC ACTIVITIES: CPT

## 2022-02-24 RX ORDER — MEMANTINE HYDROCHLORIDE 10 MG/1
TABLET ORAL
Qty: 180 TABLET | Refills: 3 | Status: SHIPPED | OUTPATIENT
Start: 2022-02-24

## 2022-02-24 NOTE — PROGRESS NOTES
[facility]  Phone: [phone] Fax: [phone]       Physical Therapy Daily Treatment Note  Date:  2022    Patient Name:  Kvng Perez    :  1940  MRN: [id number]    Restrictions/Precautions:    Diagnosis:  LS Radiculopathy, Balance Problem, Cervical Radiculopathy, Spinal Stenosis, Unspecified Neurogenic Claudication Present  Treatment Diagnosis:    Insurance/Certification information:  Medicare (cert 4004 to )  Referring Practitioner: Eugene Julien of care signed (Y/N):    Visit# / total visits:  8/10 (5 weeks)  Pain level: /10  Time In: 13:45       Time Out: 14:45         Subjective:  Pt has no new c/o this afternoon. States he is performing HEP for cervical ROM with no issues. Exercises:  Exercise/Equipment Resistance/Repetitions Other comments   Bike    10 min    Calf stretch  10\" 5 x                        @ incline   Toe raises/ 15 x each                   Step ups/over  fwd 6\" 10 x                       Over/back =1          lateral 6\" 10 x                        Over/back=1   Side stepping 10\" B x 3                      Sit to stand from box 24\" 10 x 2                       sitting     Other Therapeutic Activities: Standing balance: Feet together for narrow FRANCISCO- ball transfer overhead 10 x                                                                                  Arms @90* rotation L/R 5 x each                                Tandem standing R ft fwd/L ft fwd 20\" 2 x each    Ambulated 220' x 2 using SPC. Focus on heel toe mechanics w/S.           Home Exercise Program:  (22) Outlined written HEP  Seated Cervical Rotation AROM - 1 x daily - 10 reps  Doorway Pec Stretch at 60 Elevation - 1 x daily - 5 reps - 10 hold  Seated Shoulder Shrugs - 1 x daily - 10 reps  Seated Scapular Retraction - 1 x daily - 10 reps  Side Stepping with Counter Support - 1 x daily - 5 reps  Seated Hamstring Stretch - 1 x daily - 5 reps - 10 hold    (22) Instructed pt in step ups on bottom step of stairs for home. Manual Treatments:      Modalities:      Timed Code Treatment Minutes:  55    Total Treatment Minutes:  60     Treatment/Activity Tolerance:  [x] Patient tolerated treatment well [] Patient limited by fatigue  [] Patient limited by pain  [] Patient limited by other medical complications  [x] Other:Pt is progressing well toward PT goals. Today he exhibits consistent heel toe mechanics in his gait with and without his SPC. Plan:   [x] Continue per plan of care [] Alter current plan (see comments)  [] Plan of care initiated [] Hold pending MD visit [] Discharge  Plan for Next Session:         Treatment Charges: Mins Units   Initial Evaluation     Re-Evaluation     Ther Exercise         TE 30 2   Manual Therapy     MT     Ther Activities        TA 20 1   Gait Training          GT     Neuro Re-education NR 5 -   Modalities     Non-Billable Service Time     Other 5    Total Time/Units 60 3     Electronically signed by:   Torri Brower

## 2022-03-01 ENCOUNTER — HOSPITAL ENCOUNTER (OUTPATIENT)
Dept: PHYSICAL THERAPY | Age: 82
Setting detail: THERAPIES SERIES
Discharge: HOME | End: 2022-03-01
Payer: MEDICARE

## 2022-03-01 PROCEDURE — 97530 THERAPEUTIC ACTIVITIES: CPT

## 2022-03-01 PROCEDURE — 97110 THERAPEUTIC EXERCISES: CPT

## 2022-03-01 NOTE — PROGRESS NOTES
[facility]  Phone: [phone] Fax: [phone]       Physical Therapy Daily Treatment Note  Date:  3/1/2022    Patient Name:  Nancy Vallejo    :  1940  MRN: [id number]    Restrictions/Precautions:    Diagnosis:  LS Radiculopathy, Balance Problem, Cervical Radiculopathy, Spinal Stenosis, Unspecified Neurogenic Claudication Present  Treatment Diagnosis:    Insurance/Certification information:  Medicare (cert [id number] to )  Referring Practitioner: iRchmond Guajardo of care signed (Y/N):    Visit# / total visits:  9/10 (5 weeks)  Pain level: /10  Time In: 13:00       Time Out: 13:50         Subjective:  Pt has no new c/o this afternoon. Exercises:  Exercise/Equipment Resistance/Repetitions Other comments   Bike    10 min    Calf stretch  10\" 5 x                        @ incline   Toe raises/ 15 x each                   Step ups/over  fwd 6\" 10 x                       Over/back =1          lateral 6\" 10 x                        Over/back=1   Side stepping 10\" B x 3                      Sit to stand from box 24\" 10 x                        sitting     Other Therapeutic Activities: Standing balance: Feet together for narrow FRANCISCO- ball transfer overhead 10 x                                                                                  Arms @90* rotation L/R 7 x each                                    Ambulated 220' x 3 w/no AD and S. Home Exercise Program:  (22) Outlined written HEP  Seated Cervical Rotation AROM - 1 x daily - 10 reps  Doorway Pec Stretch at 60 Elevation - 1 x daily - 5 reps - 10 hold  Seated Shoulder Shrugs - 1 x daily - 10 reps  Seated Scapular Retraction - 1 x daily - 10 reps  Side Stepping with Counter Support - 1 x daily - 5 reps  Seated Hamstring Stretch - 1 x daily - 5 reps - 10 hold    (22) Instructed pt in step ups on bottom step of stairs for home.      Manual Treatments:      Modalities:      Timed Code Treatment Minutes: 50    Total Treatment Minutes:  50    Treatment/Activity Tolerance:  [x] Patient tolerated treatment well [] Patient limited by fatigue  [] Patient limited by pain  [] Patient limited by other medical complications  [x] Other:Pt tolerated all activities well with increased endurance for activity. Tinetti score 27/28 today. Plan:   [x] Continue per plan of care [] Alter current plan (see comments)  [] Plan of care initiated [] Hold pending MD visit [] Discharge  Plan for Next Session:         Treatment Charges: Mins Units   Initial Evaluation     Re-Evaluation     Ther Exercise         TE 30 2   Manual Therapy     MT     Ther Activities        TA 15 1   Gait Training          GT     Neuro Re-education NR 5 -   Modalities     Non-Billable Service Time     Other 5    Total Time/Units 50 3     Electronically signed by:   Torri Brower

## 2022-03-03 ENCOUNTER — HOSPITAL ENCOUNTER (OUTPATIENT)
Dept: PHYSICAL THERAPY | Age: 82
Setting detail: THERAPIES SERIES
Discharge: HOME | End: 2022-03-03
Payer: MEDICARE

## 2022-03-03 PROCEDURE — 97530 THERAPEUTIC ACTIVITIES: CPT

## 2022-03-03 PROCEDURE — 97110 THERAPEUTIC EXERCISES: CPT

## 2022-03-03 NOTE — PROGRESS NOTES
[facility]  Phone: [phone] Fax: [phone]       Physical Therapy Daily Treatment Note  Date:  3/3/2022    Patient Name:  Patrick Manuel    :  1940  MRN: [id number]    Restrictions/Precautions:    Diagnosis:  LS Radiculopathy, Balance Problem, Cervical Radiculopathy, Spinal Stenosis, Unspecified Neurogenic Claudication Present  Treatment Diagnosis:    Insurance/Certification information:  Medicare (cert  to 3/47/82)  Referring Practitioner: Izabella Brown of care signed (Y/N):    Visit# / total visits:  10/10 (5 weeks)  Pain level: /10  Time In: 9:50       Time Out: 10:50         Subjective:  Pt here for final visit on current plan of care. States he feels he is doing well with ambulation using SPC or walker outside the home. Exercises:  Exercise/Equipment Resistance/Repetitions Other comments   Bike    10 min    Side stepping 25\" B                    Outside //bars   Backward walking  25' x 2  Close SBA  Sit to stand from box 24\" 15 x     Stepping over bolsters fwd Reciprocal pattern                      lateral 2 bolsters 4 x each Seated lumbar flex10 x               sitting     Other Therapeutic Activities: Standing balance: Catch/throw ball @ multi level  Ambulated into [facility] area for tolerance of distance ambulation with no AD and S. Pt tolerated 7.5 min amb with no rests. Home Exercise Program:  (22) Outlined written HEP  Seated Cervical Rotation AROM - 1 x daily - 10 reps  Doorway Pec Stretch at 60 Elevation - 1 x daily - 5 reps - 10 hold  Seated Shoulder Shrugs - 1 x daily - 10 reps  Seated Scapular Retraction - 1 x daily - 10 reps  Side Stepping with Counter Support - 1 x daily - 5 reps  Seated Hamstring Stretch - 1 x daily - 5 reps - 10 hold    (22) Instructed pt in step ups on bottom step of stairs for home.      Manual Treatments:      Modalities:      Timed Code Treatment Minutes:  55    Total Treatment Minutes:  60    Treatment/Activity Tolerance:  [x] Patient tolerated treatment well [] Patient limited by fatigue  [] Patient limited by pain  [] Patient limited by other medical complications  [x] Other:Pt has completed current plan of care and met PT goals. Pt is ambulating with SPC and without AD over functional distances with consistent heel toe mechanics. He does exhibit a wide FRANCISCO. Tinetti score 27/28. Pt voices no issues with HEP. Plan:   [] Continue per plan of care [] Alter current plan (see comments)  [] Plan of care initiated [] Hold pending MD visit [x] Discharge  Plan for Next Session:         Treatment Charges: Mins Units   Initial Evaluation     Re-Evaluation     Ther Exercise         TE 30 2   Manual Therapy     MT     Ther Activities        TA 20 1   Gait Training          GT     Neuro Re-education NR 5 -   Modalities     Non-Billable Service Time     Other 5    Total Time/Units 60 3     Electronically signed by:   Torri Brower

## 2022-03-28 ENCOUNTER — TELEPHONE (OUTPATIENT)
Dept: NEUROLOGY | Age: 82
End: 2022-03-28

## 2022-03-28 NOTE — TELEPHONE ENCOUNTER
Patient's wife called stating that they have an appointment with the podiatrist this Wednesday and was wondering if you spoke to them. The patient himself would like to speak to you but the wife isn't so sure he'll know what he is asking. Wife states that he has not had any improvement since his last visit and does not think the medication is help. Believes he has actually gotten worse.

## 2022-03-29 NOTE — TELEPHONE ENCOUNTER
I did not speak to podiatry. I didn't start him on any new meds at his last visit, rather I weaned him off his Exelon to see if it helped with his dizziness.  If he is still dizzy than it is not this medication and if his wife feels his memory has worsened, we can put him back on it

## 2022-03-30 RX ORDER — RIVASTIGMINE TARTRATE 1.5 MG/1
1.5 CAPSULE ORAL 2 TIMES DAILY
Qty: 180 CAPSULE | Refills: 3 | Status: SHIPPED
Start: 2022-03-30 | End: 2022-09-06

## 2022-03-30 NOTE — TELEPHONE ENCOUNTER
I will restart his Exelon 1.5 mg twice daily. He just had an MRI in October for the same complaints, and there was no new abnormalities, so I do not think repeat imaging is needed at this time. However, he feels this bad or if he feels that something new is going on he might want to go to the ER for evaluation. Otherwise, set him up for sooner follow-up.

## 2022-03-30 NOTE — TELEPHONE ENCOUNTER
Patient's wife called stating that she would like Patit Ibarra to go back on the Exelon. He is still getting dizzy without it. She stated that he told her there is something wrong with his head and would like imaging done. He has an appointment on 4/4 to see you here in the [place] office.

## 2022-03-31 NOTE — TELEPHONE ENCOUNTER
Called patient's wife letting her know medication was sent to pharmacy. Patient has appointment on 4/4/22.

## 2022-04-03 NOTE — PROGRESS NOTES
[facility]. Noelle Castro M.D., F.A.C.P. Guanako Coronel, ANTHONY, APRN, CNS  Gia Gonzales. Clarence Lyon, MSN, APRN-FNP-C  Jack Cannon MSN, APRN, FNP-C  NIKUNJ Burch MSN, APRN, FNP-C  [address], [id number] [place] [address]  Phone: [phone]  Fax: [phone]         Ernie Milner is a 79 y.o. right handed man    We are following him for Alzheimer's dementia, mild peripheral neuropathy, and LS and cervical radiculopathy    He presents with his wife and daughter today. He is a fair historian and they provide collateral information. He requested a sooner follow-up due to persistent dizziness which has been going on for several months. He again is not describing vertigo, rather feeling off balance like he is walking on a boat. Thankfully, no falls and not using assistive device. He found physical therapy very beneficial and is asking for a renewal of the prescription. His dizziness did not improve when taken off of rivastigmine and this was restarted, however there also appears to be donepezil on his medication list--which his wife states he is not currently taking. There is significant confusion about what he is taking and not taking. Has been on current dose of amlodipine as well as Valium for years per PCP. They also did not get make an appointment for neurosurgery referral to discuss his lumbar stenosis and appear confused about this. Memory issues are stable since restarting the Exelon atop his memantine. He is no longer on Zoloft at all because of lethargy. Sleeping well at night. He feels that he has handled the grief of the death of his son better now and has accepted it.     No chest pain or palpitations  No SOB  No loss of consciousness  No incontinence of bowels or bladder  No itching or bruising appreciated  No numbness, tingling or focal arm/leg weakness  No speech or swallowing troubles    ROS otherwise negative     Current Outpatient Medications   Medication Sig Dispense Refill    rivastigmine (EXELON) 1.5 MG capsule Take 1 capsule by mouth 2 times daily 180 capsule 3    memantine (NAMENDA) 10 MG tablet TAKE 1 TABLET TWICE A  tablet 3    sertraline (ZOLOFT) 25 MG tablet Take 1 tablet by mouth daily 90 tablet 3    ondansetron (ZOFRAN) 4 MG tablet Take 1 tablet by mouth every 12 hours as needed for Nausea or Vomiting 60 tablet 0    Multiple Vitamins-Minerals (THERAPEUTIC MULTIVITAMIN-MINERALS) tablet Take 1 tablet by mouth every morning Last dose 3-1-21      vitamin E 400 UNIT capsule Take 400 Units by mouth every morning Last dose 3-1-21      pantoprazole (PROTONIX) 40 MG tablet Take 40 mg by mouth every morning       atorvastatin (LIPITOR) 40 MG tablet Take 40 mg by mouth every morning       amLODIPine (NORVASC) 10 MG tablet Take 10 mg by mouth every morning       vitamin B-12 (CYANOCOBALAMIN) 500 MCG tablet Take 500 mcg by mouth every morning       aspirin 81 MG chewable tablet Take 1 tablet by mouth daily (Patient taking differently: Take 81 mg by mouth every morning )      diazepam (VALIUM) 5 MG tablet Take 5 mg by mouth every morning. No current facility-administered medications for this visit.      Objective:     /68   Pulse 82   Ht 5' 10\" (1.778 m)   Wt 178 lb (80.7 kg)   SpO2 97%   BMI 25.54 kg/m²     General appearance: alert, appears stated age, cooperative  Head: normocephalic/atraumatic  Eyes: sclerae, conjunctivae/corneas clear no drainage  Neck: limited ROM with no cogwheeling  Lungs: clear to auscultation bilaterally; resps nonlabored  Heart: regular rate and rhythm---no murmur  Extremities: no edema  Pulses: 2+ and symmetric  Skin: no abrasions or rashes; dry skin BLE    Mental Status: alert and oriented x 4--- calm and pleasant    Appropriate attention/concentration  Evidence of short-term memory issues  Able to perform simple and complex commands well    Speech: no dysarthria  Language: no aphasias    Cranial Nerves:  I: smell    II: visual acuity     II: visual fields Full    II: pupils ARNOLD   III,VII: ptosis None     III,IV,VI: extraocular muscles  EOMI with no nystagmus   V: mastication Normal   V: facial light touch sensation  Normal   V,VII: corneal reflex     VII: facial muscle function - upper  Normal   VII: facial muscle function - lower Normal today   VIII: hearing Decreased R---wears aids   IX: soft palate elevation  Normal   IX,X: gag reflex    XI: trapezius strength  5/5   XI: sternocleidomastoid strength 5/5   XI: neck extension strength  5/5   XII: tongue strength  Normal     Motor:  5/5 throughout  No drift  Spastic legs; normal bulk  No abnormal movements    Sensory:  LT normal in all limbs  Vib impaired both ankles--L>R    Coordination:   FN and FFM intact b/l    Gait:  Spastic, wider based steppage gait with some shuffling--antalgic  Normal arm swinging  No festination; turns en bloc    DTR:   Right Brachioradialis reflex 1+  Left Brachioradialis reflex 1+  Right Biceps reflex 1+  Left Biceps reflex 1+  Right Triceps reflex 1+  Left Triceps reflex 1+  Right Quadriceps reflex 1+  Left Quadriceps reflex 1+  Right Achilles reflex 0  Left Achilles reflex 0    No Rodriguez's  No grasps, suck, or other pathological reflexes    Laboratory/Radiology:  ry/Radiology:     No current labs or imaging to review    Assessment:     Dizziness: Multifactorial.  I am concerned about what medications he is taking or not taking based on the discussion today with his wife and daughter, as there appears to be a great amount of confusion. Again, he is describing positional vertigo rather balance problems which may be related to his lumbosacral disease and underlying dementia. Extensive work-up for vascular events was unrevealing.     Probable Alzheimer's dementia: Currently stable on Exelon and memantine with no behavioral issues    LS radiculopathy: crowding of cauda equina on last MRI. Neurosurgery evaluation is pending per patient found PT beneficial.    Cervical stenosis with radiculopathy: at C3 down to C7 but no major cord compression on last MRI.      Hx remote occipital stroke: on ASA/statin    Plan:     [facility] with [facility] and [facility]    Continue Exelon 1.5 mg BID and Namenda 10 mg BID    Do not take donepezil with Exelon--this was removed from his list    Reordered PT Arline Castro     He will neurosurgery to schedule referral appt    Recommend daughter help patient's wife supervise medications    Discussed referral to [facility] for additional opinion--pt and family declined at this time    Call for appointment after seen by neurosurgery    LIZETH Lazcano CNP  4:21 PM  4/3/2022

## 2022-04-04 ENCOUNTER — OFFICE VISIT (OUTPATIENT)
Dept: NEUROLOGY | Age: 82
End: 2022-04-04
Payer: MEDICARE

## 2022-04-04 VITALS
BODY MASS INDEX: 25.48 KG/M2 | HEART RATE: 82 BPM | HEIGHT: 70 IN | SYSTOLIC BLOOD PRESSURE: 114 MMHG | WEIGHT: 178 LBS | DIASTOLIC BLOOD PRESSURE: 68 MMHG | OXYGEN SATURATION: 97 %

## 2022-04-04 DIAGNOSIS — M54.17 L-S RADICULOPATHY: ICD-10-CM

## 2022-04-04 DIAGNOSIS — R42 PERSISTENT POSTURAL-PERCEPTUAL DIZZINESS: Primary | ICD-10-CM

## 2022-04-04 DIAGNOSIS — R26.89 BALANCE PROBLEM: ICD-10-CM

## 2022-04-04 DIAGNOSIS — G62.9 PERIPHERAL POLYNEUROPATHY: ICD-10-CM

## 2022-04-04 DIAGNOSIS — F02.81 ALZHEIMER'S DEMENTIA WITH BEHAVIORAL DISTURBANCE, UNSPECIFIED TIMING OF DEMENTIA ONSET: ICD-10-CM

## 2022-04-04 DIAGNOSIS — M48.061 SPINAL STENOSIS OF LUMBAR REGION, UNSPECIFIED WHETHER NEUROGENIC CLAUDICATION PRESENT: ICD-10-CM

## 2022-04-04 DIAGNOSIS — G30.9 ALZHEIMER'S DEMENTIA WITH BEHAVIORAL DISTURBANCE, UNSPECIFIED TIMING OF DEMENTIA ONSET: ICD-10-CM

## 2022-04-04 PROBLEM — H81.8X9 PERSISTENT POSTURAL-PERCEPTUAL DIZZINESS: Status: ACTIVE | Noted: 2021-09-24

## 2022-04-04 PROCEDURE — 4040F PNEUMOC VAC/ADMIN/RCVD: CPT | Performed by: NURSE PRACTITIONER

## 2022-04-04 PROCEDURE — 99214 OFFICE O/P EST MOD 30 MIN: CPT | Performed by: NURSE PRACTITIONER

## 2022-04-04 PROCEDURE — 1123F ACP DISCUSS/DSCN MKR DOCD: CPT | Performed by: NURSE PRACTITIONER

## 2022-04-04 PROCEDURE — 1036F TOBACCO NON-USER: CPT | Performed by: NURSE PRACTITIONER

## 2022-04-04 PROCEDURE — G8427 DOCREV CUR MEDS BY ELIG CLIN: HCPCS | Performed by: NURSE PRACTITIONER

## 2022-04-04 PROCEDURE — G8417 CALC BMI ABV UP PARAM F/U: HCPCS | Performed by: NURSE PRACTITIONER

## 2022-04-04 RX ORDER — CHLORAL HYDRATE 500 MG
3000 CAPSULE ORAL DAILY
COMMUNITY
End: 2022-08-16 | Stop reason: ALTCHOICE

## 2022-04-04 RX ORDER — OYSTER SHELL CALCIUM WITH VITAMIN D 500; 200 MG/1; [IU]/1
1 TABLET, FILM COATED ORAL DAILY
COMMUNITY
End: 2022-08-16 | Stop reason: ALTCHOICE

## 2022-04-04 RX ORDER — DONEPEZIL HYDROCHLORIDE 10 MG/1
10 TABLET, FILM COATED ORAL NIGHTLY
COMMUNITY
End: 2022-04-04 | Stop reason: ALTCHOICE

## 2022-04-04 NOTE — PATIENT INSTRUCTIONS
Patient Education        Preventing Falls: Care Instructions  Your Care Instructions     Getting around your home safely can be a challenge if you have injuries or health problems that make it easy for you to fall. Loose rugs and furniture in walkways are among the dangers for many older people who have problems walking or who have poor eyesight. People who have conditions such as arthritis,osteoporosis, or dementia also have to be careful not to fall. You can make your home safer with a few simple measures. Follow-up care is a key part of your treatment and safety. Be sure to make and go to all appointments, and call your doctor if you are having problems. It's also a good idea to know your test results and keep alist of the medicines you take. How can you care for yourself at home? Taking care of yourself   Exercise regularly to improve your strength, muscle tone, and balance. Walk if you can. Swimming may be a good choice if you cannot walk easily.  Have your vision and hearing checked each year or any time you notice a change. If you have trouble seeing and hearing, you might not be able to avoid objects and could lose your balance.  Know the side effects of the medicines you take. Ask your doctor or pharmacist whether the medicines you take can affect your balance. Sleeping pills or sedatives can affect your balance.  Limit the amount of alcohol you drink. Alcohol can impair your balance and other senses.  Ask your doctor whether calluses or corns on your feet need to be removed. If you wear loose-fitting shoes because of calluses or corns, you can lose your balance and fall.  Talk to your doctor if you have numbness in your feet.  You may get dizzy if you do not drink enough water. To prevent dehydration, drink plenty of fluids. Choose water and other clear liquids.  If you have kidney, heart, or liver disease and have to limit fluids, talk with your doctor before you increase the amount of fluids you drink. Preventing falls at home   Remove raised doorway thresholds, throw rugs, and clutter. Repair loose carpet or raised areas in the floor. [facility] furniture and electrical cords to keep them out of walking paths.  Use nonskid floor wax, and wipe up spills right away, especially on ceramic tile floors.  If you use a walker or cane, put rubber tips on it. If you use crutches, clean the bottoms of them regularly with an abrasive pad, such as steel wool.  Keep your house well lit, especially Steven Redo, and outside walkways. Use night-lights in areas such as hallways and bathrooms. Add extra light switches or use remote switches (such as switches that go on or off when you clap your hands) to make it easier to turn lights on if you have to get up during the night.  Install sturdy handrails on stairways.  Move items in your cabinets so that the things you use a lot are on the lower shelves (about waist level).  Keep a cordless phone and a flashlight with new batteries by your bed. If possible, put a phone in each of the main rooms of your house, or carry a cell phone in case you fall and cannot reach a phone. Or, you can wear a device around your neck or wrist. You push a button that sends a signal for help.  Wear low-heeled shoes that fit well and give your feet good support. Use footwear with nonskid soles. Check the heels and soles of your shoes for wear. Repair or replace worn heels or soles.  Do not wear socks without shoes on wood floors.  Walk on the grass when the sidewalks are slippery. If you live in an area that gets snow and ice in the winter, sprinkle salt on slippery steps and sidewalks. Or ask a family member or friend to do this for you. Preventing falls in the bath   Install grab bars and nonskid mats inside and outside your shower or tub and near the toilet and sinks.  Use shower chairs and bath benches.    Use a hand-held shower head that will allow you to sit while showering.  Get into a tub or shower by putting the weaker leg in first. Get out of a tub or shower with your strong side first.   Repair loose toilet seats and consider installing a raised toilet seat to make getting on and off the toilet easier.  Keep your bathroom door unlocked while you are in the shower. Where can you learn more? Go to https://[employer]. org and sign in to your [employer] account. Enter [phone] in the [facility] box to learn more about \"Preventing Falls: Care Instructions. \"     If you do not have an account, please click on the \"Sign Up Now\" link. Current as of: September 8, 2021               Content Version: 13.2  © 3091-0284 Healthwise, Incorporated. Care instructions adapted under license by [facility] ([facility]). If you have questions about a medical condition or this instruction, always ask your healthcare professional. [place] [address] any warranty or liability for your use of this information.

## 2022-04-21 ENCOUNTER — TELEPHONE (OUTPATIENT)
Dept: NEUROLOGY | Age: 82
End: 2022-04-21

## 2022-06-29 ENCOUNTER — HOSPITAL ENCOUNTER (OUTPATIENT)
Dept: PHYSICAL THERAPY | Age: 82
Setting detail: THERAPIES SERIES
Discharge: HOME | End: 2022-06-29
Payer: MEDICARE

## 2022-06-29 PROCEDURE — 97161 PT EVAL LOW COMPLEX 20 MIN: CPT

## 2022-06-29 NOTE — PLAN OF CARE
[address] [facility] PHYSICAL THERAPY  [place] [address]  Dept: [address]: [phone]    PHYSICAL THERAPY PLAN OF CARE: INITIAL EVALUATION    Patient: Layne Knight (89 y.o. male)   Examination Date:   Plan of Care Certification Period: 2022 to        :  1940  MRN: [id number]  CSN: [id number]   Insurance: Payor: Meagan Ag / Plan: MEDICARE PART A AND B / Product Type: *No Product type* /   Insurance ID: [id number] - (Medicare) Secondary Insurance (if applicable): Ignacio Guardado   Referring Physician: LIZETH Cervantes -*     PCP: Nicole Dumont MD Visits to Date/Visits Approved:   /      No Show/Cancelled Appts:   /       Medical Diagnosis: Radiculopathy, lumbosacral region [M54.17]  Other abnormalities of gait and mobility [R26.89]  Radiculopathy, cervical region [M54.12] Radiculopathy; Balance Issues  No data recorded     SUBJECTIVE EXAMINATION      History obtained from[de-identified] Patient,Chart Review,      Family/Caregiver Present: No     Subjective History: Onset Date: 22  Subjective: Intermittent difficulty with ambulation and balance with ADL's  Additional Pertinent Hx (if applicable): Patient presents to PT to assess nad treat mobility/balance issues of unspecified origin. Patient was treated for similar issues from  to 2022 No recent dx testsor additional medical consults   Prior diagnostic testing[de-identified] X-ray,MRI  Previous treatments prior to current episode?: Outpatient PT     ASSESSMENT      Impression:       Body Structures, Functions, Activity Limitations Requiring Skilled Therapeutic Intervention: Decreased functional mobility ,Decreased ROM,Decreased strength,Decreased endurance,Decreased balance,Decreased coordination,Decreased posture     Statement of Medical Necessity: Physical Therapy is both indicated and medically necessary as outlined in the POC to increase the likelihood of meeting the functionally related goals stated below.    Patient's Activity Tolerance: Patient tolerated evaluation without incident      Patient's rehabilitation potential/prognosis is considered to be: Fair     Factors which may impact rehabilitation potential include: Age,Cognitive function           Conditions Requiring Skilled Therapeutic Intervention  Body Structures, Functions, Activity Limitations Requiring Skilled Therapeutic Intervention: Decreased functional mobility ; Decreased ROM; Decreased strength;Decreased endurance;Decreased balance;Decreased coordination;Decreased posture  Therapy Prognosis: Fair  Activity Tolerance  Activity Tolerance: Patient tolerated evaluation without incident  Activity Tolerance: Patient tolerated evaluation without incident       Plan  Plan weeks: 3-4 weeks  Current Treatment Recommendations: Strengthening,Balance training,ROM,Endurance training,Home exercise program,Patient/Caregiver education & training    G-Code:       Balance and Gait:  Not Assessed    OutComes Score:                                                     AM-PAC Score:     Frequency / Duration:  Patient to be seen 1-2 times per week for 3-4 weeks weeks   Eval Complexity:    Decision Making: Low Complexity  Examination of body system(s) including body structures and functions, activity limitations, and/or participation restrictions: Low  Clinical Presentation: Low     PT Treatment Completed:  N/A - Evaluation Only     Therapy Time  Individual Time In:         Individual Time Out:    Minutes: Therapist Signature: Eneida Gill PT    Date: 0/25/4948      I certify that the above Therapy Services are being furnished while the patient is under my care.  I agree with the treatment plan and certify that this therapy is necessary.       Physician's Signature:  ___________________________   Date:_______                                                                   Lory Marie, LIZETH -*          Physician Comments: _______________________________________________     Please sign and return to [facility] PHYSICAL THERAPY. Please fax to the location listed below. Az Howell for this referral!                Goals:  Short Term Goals  Time Frame for Short term goals: 1-2 weeks  Short term goal 1: Establish HEP  Long Term Goals  Time Frame for Long term goals : 3-4 weeks  Long term goal 1: Patient will demonstrate the ability  and willingness to perform an independent active exercise program  Patient Goals   Patient goals : Walk Better ; Improve Balance      Patient Status: [x] Continue / Initiate Plan of Care     Signature: Electronically signed by Nico Patel PT on 6/29/2022 at 11:33 AM.     If you have any questions or concerns, please don't hesitate to call.   Thank you for your referral!

## 2022-06-29 NOTE — PROGRESS NOTES
Physical Therapy  Initial Assessment  Date: 2022  Patient Name: Harinder Aranda  MRN: [id number]  : 1940    Referring Physician: LIZETH Choudhury -*     PCP: Delonte Aceves MD     Medical Diagnosis: Radiculopathy, lumbosacral region [M54.17]  Other abnormalities of gait and mobility [R26.89]  Radiculopathy, cervical region [M54.12] Radiculopathy; Balance Issues  No data recorded    Insurance: Payor: MEDICARE / Plan: MEDICARE PART A AND B / Product Type: *No Product type* /   Insurance ID: [id number] - (Medicare)      Restrictions:       Subjective:   General  Chart Reviewed: Yes  Patient Assessed for Rehabilitation Services: Yes  Additional Pertinent Hx: Patient presents to PT to assess nad treat mobility/balance issues of unspecified origin. Patient was treated for similar issues from  to 2022 No recent dx testsor additional medical consults  History obtained from[de-identified] Patient,Chart Review  Family/Caregiver Present: No  Diagnosis: Radiculopathy; Balance Issues  Follows Commands: Within Functional Limits  PT Visit Information  Onset Date: 22  PT Insurance Information: Medicare  Subjective  Subjective: Intermittent difficulty with ambulation and balance with ADL's  Prior diagnostic testing[de-identified] X-ray,MRI  Previous treatments prior to current episode?: Outpatient PT  Pain Screening  Patient Currently in Pain: No       Vision/Hearing:  Vision  Vision: Within Functional Limits  Hearing  Hearing: Within functional limits    Orientation:  Orientation  Overall Orientation Status: Within Functional Limits  Patient affect[de-identified] Normal  Follows Commands: Within Functional Limits    Social History:  Social History  Lives With: Spouse  Type of Home: House  Home Layout: Two level; Work area in [address]: adrianne Sahu    Functional Status:  Functional Status  Prior level of function: mod I  Occupation: Retired  Receives Help From: [employer] Responsibilities: No  Active : No    Objective:               Strength RLE  R Hip Flexion: 4-/5  R Hip Extension: 3+/5  R Hip ABduction: 3/5  Strength LLE  L Hip Flexion: 4-/5  L Hip Extension: 3+/5  L Hip ABduction: 3/5  Tone  Trunk tone: Normotonic  RLE Tone: Normotonic  LLE Tone: Normotonic                   Ambulation  Surface: level tile  Device: No Device  Assistance: Modified Independent  Gait Deviations: Slow Deanna; Increased FRANCISCO; Decreased step length;Decreased step height;Decreased arm swing;Decreased head and trunk rotation  Stairs/Curb  Stairs?: No                            Outpatient Rehab Objectives (Peds):  NA    Neuro Screen (Peds): Not Assessed     Outcome Measure(s) Completed (Peds): Not Assessed    Treatments Completed:  N/A - Evaluation Only    Assessment:    Conditions Requiring Skilled Therapeutic Intervention  Body Structures, Functions, Activity Limitations Requiring Skilled Therapeutic Intervention: Decreased functional mobility ; Decreased ROM; Decreased strength;Decreased endurance;Decreased balance;Decreased coordination;Decreased posture  Therapy Prognosis: Fair  Activity Tolerance  Activity Tolerance: Patient tolerated evaluation without incident  Activity Tolerance: Patient tolerated evaluation without incident         Plan:    Plan  Plan weeks: 3-4 weeks  Current Treatment Recommendations: Strengthening,Balance training,ROM,Endurance training,Home exercise program,Patient/Caregiver education & training    G-Code:       Balance and Gait:  Not Assessed    OutComes Score:                                                     AM-PAC Score:             Goals:  Short Term Goals  Time Frame for Short term goals: 1-2 weeks  Short term goal 1: Establish HEP  Long Term Goals  Time Frame for Long term goals : 3-4 weeks  Long term goal 1: Patient will demonstrate the ability  and willingness to perform an independent active exercise program  Patient Goals   Patient goals : Walk Better ; Improve Balance       Therapy Time: Individual Concurrent Group Co-treatment   Time In           Time Out           Minutes                   Jamar Howell PT       Physical Therapy: Initial Evaluation    Patient: Clarissa Zambrano (70 y.o. male)   Examination Date:   Plan of Care Certification Period: 2022 to        :  1940 ;    Confirmed: Yes MRN: [id number]  CSN: [id number]   Insurance: Payor: [address] [place] [address] / Plan: MEDICARE PART A AND B / Product Type: *No Product type* /   Insurance ID: [id number] - (Medicare) Secondary Insurance (if applicable): Tan Llamas   Referring Physician: LIZETH Matthews -*     PCP: Micki Reyes MD Visits to Date/Visits Approved:   /      No Show/Cancelled Appts:   /       Medical Diagnosis: Radiculopathy, lumbosacral region [M54.17]  Other abnormalities of gait and mobility [R26.89]  Radiculopathy, cervical region [M54.12] Radiculopathy; Balance Issues  Treatment Diagnosis:       PERTINENT MEDICAL HISTORY   Patient Assessed for Rehabilitation Services: Yes       Medical History: Chart Reviewed: Yes   Past Medical History:   Diagnosis Date    Abdominal aortic aneurysm (AAA) 3.0 cm to 5.5 cm in diameter in male [facility]) 2019    wife unaware    Acute respiratory failure with hypoxia ([address] 2019    Aspiration pneumonia ([address] 2019    COVID-19 2020    CVA (cerebral vascular accident) ([address]     x 3    Dementia ([address]     Depression     Heart murmur     Hematemesis     history of    RAF (obstructive sleep apnea)     wears CPAP    Osteoarthritis     Prostate cancer ([address] 2007    Sepsis ([address] 2019    Present on admission    Stage 3 chronic kidney disease ([address] 2019     Surgical History:   Past Surgical History:   Procedure Laterality Date    CATARACT REMOVAL      HERNIA REPAIR      PAIN MANAGEMENT PROCEDURE Bilateral 3/8/2021    # 1 LUMBAR TRANSFORAMINAL EPIDURAL STEROID INJECTION bilateral L4 UNDER FLUOROSCOPIC GUIDANCE performed by Farhan Romero MD at [facility] OR    PROSTATE SURGERY      beacons/radiation    UPPER GASTROINTESTINAL ENDOSCOPY N/A 6/15/2019    EGD ESOPHAGOGASTRODUODENOSCOPY WITH BIOPSY performed by Radha Holden MD at [facility] OR       Medications:   Current Outpatient Medications:     Omega-3 Fatty Acids (FISH OIL) 1000 MG CAPS, Take 3,000 mg by mouth daily, Disp: , Rfl:     calcium-vitamin D (OSCAL-500) 500-200 MG-UNIT per tablet, Take 1 tablet by mouth daily, Disp: , Rfl:     rivastigmine (EXELON) 1.5 MG capsule, Take 1 capsule by mouth 2 times daily, Disp: 180 capsule, Rfl: 3    memantine (NAMENDA) 10 MG tablet, TAKE 1 TABLET TWICE A DAY, Disp: 180 tablet, Rfl: 3    Multiple Vitamins-Minerals (THERAPEUTIC MULTIVITAMIN-MINERALS) tablet, Take 1 tablet by mouth every morning Last dose 3-1-21, Disp: , Rfl:     vitamin E 400 UNIT capsule, Take 400 Units by mouth every morning Last dose 3-1-21, Disp: , Rfl:     pantoprazole (PROTONIX) 40 MG tablet, Take 40 mg by mouth every morning , Disp: , Rfl:     atorvastatin (LIPITOR) 40 MG tablet, Take 40 mg by mouth every morning , Disp: , Rfl:     amLODIPine (NORVASC) 10 MG tablet, Take 10 mg by mouth every morning , Disp: , Rfl:     vitamin B-12 (CYANOCOBALAMIN) 500 MCG tablet, Take 500 mcg by mouth every morning , Disp: , Rfl:     aspirin 81 MG chewable tablet, Take 1 tablet by mouth daily (Patient taking differently: Take 81 mg by mouth every morning ), Disp: , Rfl:     diazepam (VALIUM) 5 MG tablet, Take 5 mg by mouth every morning. , Disp: , Rfl:   Allergies: Oxycodone-acetaminophen and Vicodin [hydrocodone-acetaminophen]      SUBJECTIVE EXAMINATION     History obtained from[de-identified] Patient,Chart Review,      Family/Caregiver Present: No    Subjective History: Onset Date: 04/04/22  Subjective: Intermittent difficulty with ambulation and balance with ADL's  Additional Pertinent Hx (if applicable): Patient presents to PT to assess nad treat mobility/balance issues of unspecified origin.  Patient was treated for similar issues from Jan to March 2022 No recent dx testsor additional medical consults   Prior diagnostic testing[de-identified] X-ray,MRI  Previous treatments prior to current episode?: Outpatient PT      Learning/Language: Learning  Does the patient/guardian have any barriers to learning?: No barriers  Will there be a co-learner?: No  What is the preferred language of the patient/guardian?: English  Is an  required?: No  How does the patient/guardian prefer to learn new concepts?: Listening     Pain Screening    Pain Screening  Patient Currently in Pain: No    Functional Status         Social History:    Social History  Lives With: Spouse  Type of Home: House  Home Layout: Two level,Work area in basement  Home Equipment: [employer], rolling    Occupation/Interests:   Occupation: Retired    Prior Level of Function:     mod I        Current Level of Function:          Receives Help From: Family  [employer]em[employer] Responsibilities: No  Active : No    OBJECTIVE EXAMINATION   Restrictions:              Review of Systems:  Vision: Within Functional Limits  Hearing: Within functional limits  Overall Orientation Status: Within Functional Limits  Patient affect[de-identified] Normal  Follows Commands: Within Functional Limits    VBI Screening / Lumbar Screening:         Regional Screen:         Observations:   General Observations  Description: When standing or ambulating patient exhibits a wide medial/lateral base of support. Trunk is maintained in Modest flexion with hands held  in the frontal plance. Patient exhibits limited trunk rotation and limited lumbo-pelvic mobiltiy when ambulating.  Patient maintains the wide base of support with limited step/stride length    Palpation:        Ambulation/Gait (if applicable):   Ambulation  Surface: level tile  Device: No Device  Assistance: Modified Independent  Gait Deviations: Slow Deanna,Increased FRANCISCO,Decreased step length,Decreased step height,Decreased arm swing,Decreased head and trunk rotation  Stairs/Curb  Stairs?: No    Balance Screen:   Balance  Sitting - Static: Good  Standing - Static: Fair  Standing - Dynamic: Fair,-    Neuro Screen:   Not Assessed  Left AROM  Right AROM                    Left PROM  Right PROM                    Left Strength  Right Strength         Strength LLE  L Hip Flexion: 4-/5  L Hip Extension: 3+/5  L Hip ABduction: 3/5    Strength RLE  R Hip Flexion: 4-/5  R Hip Extension: 3+/5  R Hip ABduction: 3/5     Cervical Assessment               Thoracic Assessment             Lumbar Assessment     AROM Lumbar Spine   Lumbar spine general AROM: Flexion limited 40% Extension limited 60%       Trunk Strength     Trunk Strength  Trunk Flexion: 3+/5  Trunk Extension: 4-/5     Muscle Length/Flexibility:      Joint Mobility (if applicable):        Special Tests:        Balance/Gait Assessment(s) Performed:   Not Assessed    Additional Finding(s) (if applicable):    NA       ASSESSMENT     Impression:      Body Structures, Functions, Activity Limitations Requiring Skilled Therapeutic Intervention: Decreased functional mobility ,Decreased ROM,Decreased strength,Decreased endurance,Decreased balance,Decreased coordination,Decreased posture    Statement of Medical Necessity: Physical Therapy is both indicated and medically necessary as outlined in the POC to increase the likelihood of meeting the functionally related goals stated below. Patient's Activity Tolerance: Patient tolerated evaluation without incident      Patient's rehabilitation potential/prognosis is considered to be:  Fair    Factors which may impact rehabilitation potential include: Age,Cognitive function        GOALS   Patient Goal(s):    Short Term Goals Completed by 1-2 weeks Goal Status   Establish HEP                                 Long Term Goals Completed by 3-4 weeks Goal Status   Patient will demonstrate the ability  and willingness to perform an independent active exercise program TREATMENT PLAN       Requires PT Follow-Up: Yes    Pt. actively involved in establishing Plan of Care and Goals: Yes  Patient/ Caregiver education and instruction:               Treatment may include any combination of the following: Strengthening,Balance training,ROM,Endurance training,Home exercise program,Patient/Caregiver education & training     Frequency / Duration:  Patient to be seen 1-2 times per week for 3-4 weeks weeks      Eval Complexity:    Decision Making: Low Complexity  Examination of body system(s) including body structures and functions, activity limitations, and/or participation restrictions: Low  Clinical Presentation: Low    PT Treatment Completed:  N/A - Evaluation Only    Therapy Time  Individual Time In:         Individual Time Out:    Minutes: Therapist Signature: Deneen Cho, PT    Date: 2/00/7308     I certify that the above Therapy Services are being furnished while the patient is under my care. I agree with the treatment plan and certify that this therapy is necessary. Physician's Signature:  ___________________________   Date:_______                                                                   LIZETH Forbes -*        Physician Comments: _______________________________________________    Please sign and return to [facility] PHYSICAL THERAPY. Please fax to the location listed below.  Anushka Lobato for this referral!    [address] [facility] PHYSICAL THERAPY  [facility] [address]  Dept: [phone]  Fax: [phone]       POC NOTE

## 2022-07-07 ENCOUNTER — HOSPITAL ENCOUNTER (OUTPATIENT)
Dept: PHYSICAL THERAPY | Age: 82
Setting detail: THERAPIES SERIES
Discharge: HOME | End: 2022-07-07

## 2022-07-07 NOTE — FLOWSHEET NOTE
[facility]  Phone: [phone] Fax: [phone]     Physical Therapy  Cancellation/No-show Note  Patient Name:  Inocencio Bejarano  :  1940   Date:  2022    For today's appointment patient:  []  Cancelled  []  Rescheduled appointment  [x]  No-show     Reason given by patient:  []  Patient ill  []  Conflicting appointment  []  No transportation    []  Conflict with work  []  No reason given  []  Other:     Comments:  Called patient regarding missed appointment. Stated he was unaware of appointment. Attempted to schedule sessions for next week however he states has to arrange transportation. Advised pt to call when he has transportation arrangements. Electronically signed by:   Guille Rogers

## 2022-08-16 ENCOUNTER — APPOINTMENT (OUTPATIENT)
Dept: ULTRASOUND IMAGING | Age: 82
DRG: 379 | End: 2022-08-16
Payer: MEDICARE

## 2022-08-16 ENCOUNTER — HOSPITAL ENCOUNTER (INPATIENT)
Age: 82
LOS: 2 days | Discharge: HOME | DRG: 379 | End: 2022-08-18
Attending: EMERGENCY MEDICINE | Admitting: INTERNAL MEDICINE
Payer: MEDICARE

## 2022-08-16 ENCOUNTER — APPOINTMENT (OUTPATIENT)
Dept: CT IMAGING | Age: 82
DRG: 379 | End: 2022-08-16
Payer: MEDICARE

## 2022-08-16 DIAGNOSIS — I63.9 CEREBROVASCULAR ACCIDENT (CVA), UNSPECIFIED MECHANISM (HCC): ICD-10-CM

## 2022-08-16 DIAGNOSIS — K92.2 GASTROINTESTINAL HEMORRHAGE, UNSPECIFIED GASTROINTESTINAL HEMORRHAGE TYPE: Primary | ICD-10-CM

## 2022-08-16 DIAGNOSIS — R52 PAIN: ICD-10-CM

## 2022-08-16 PROBLEM — G47.33 OSA (OBSTRUCTIVE SLEEP APNEA): Status: ACTIVE | Noted: 2022-08-16

## 2022-08-16 PROBLEM — F03.90 DEMENTIA (HCC): Status: ACTIVE | Noted: 2022-08-16

## 2022-08-16 LAB
ABO/RH: NORMAL
ALBUMIN SERPL-MCNC: 4.1 G/DL (ref 3.5–5.2)
ALP BLD-CCNC: 105 U/L (ref 40–129)
ALT SERPL-CCNC: 11 U/L (ref 0–40)
ANION GAP SERPL CALCULATED.3IONS-SCNC: 10 MMOL/L (ref 7–16)
ANTIBODY SCREEN: NORMAL
AST SERPL-CCNC: 13 U/L (ref 0–39)
BACTERIA: NORMAL /HPF
BASOPHILS ABSOLUTE: 0.01 E9/L (ref 0–0.2)
BASOPHILS RELATIVE PERCENT: 0.1 % (ref 0–2)
BILIRUB SERPL-MCNC: 1.3 MG/DL (ref 0–1.2)
BILIRUBIN DIRECT: 0.2 MG/DL (ref 0–0.3)
BILIRUBIN URINE: NEGATIVE
BILIRUBIN, INDIRECT: 1.1 MG/DL (ref 0–1)
BLOOD, URINE: NEGATIVE
BUN BLDV-MCNC: 38 MG/DL (ref 6–23)
CALCIUM SERPL-MCNC: 9 MG/DL (ref 8.6–10.2)
CHLORIDE BLD-SCNC: 105 MMOL/L (ref 98–107)
CLARITY: CLEAR
CO2: 25 MMOL/L (ref 22–29)
COLOR: YELLOW
CREAT SERPL-MCNC: 1.5 MG/DL (ref 0.7–1.2)
EKG ATRIAL RATE: 89 BPM
EKG P AXIS: 44 DEGREES
EKG P-R INTERVAL: 186 MS
EKG Q-T INTERVAL: 350 MS
EKG QRS DURATION: 88 MS
EKG QTC CALCULATION (BAZETT): 425 MS
EKG R AXIS: 17 DEGREES
EKG T AXIS: 40 DEGREES
EKG VENTRICULAR RATE: 89 BPM
EOSINOPHILS ABSOLUTE: 0.24 E9/L (ref 0.05–0.5)
EOSINOPHILS RELATIVE PERCENT: 2.9 % (ref 0–6)
GFR AFRICAN AMERICAN: 54
GFR NON-AFRICAN AMERICAN: 45 ML/MIN/1.73
GLUCOSE BLD-MCNC: 116 MG/DL (ref 74–99)
GLUCOSE URINE: NEGATIVE MG/DL
HBA1C MFR BLD: 5.7 % (ref 4–5.6)
HCT VFR BLD CALC: 47.5 % (ref 37–54)
HEMOGLOBIN: 15.8 G/DL (ref 12.5–16.5)
IMMATURE GRANULOCYTES #: 0.02 E9/L
IMMATURE GRANULOCYTES %: 0.2 % (ref 0–5)
KETONES, URINE: NEGATIVE MG/DL
LACTIC ACID: 2.2 MMOL/L (ref 0.5–2.2)
LEUKOCYTE ESTERASE, URINE: NEGATIVE
LIPASE: 9 U/L (ref 13–60)
LYMPHOCYTES ABSOLUTE: 2.59 E9/L (ref 1.5–4)
LYMPHOCYTES RELATIVE PERCENT: 31.6 % (ref 20–42)
MCH RBC QN AUTO: 32.5 PG (ref 26–35)
MCHC RBC AUTO-ENTMCNC: 33.3 % (ref 32–34.5)
MCV RBC AUTO: 97.7 FL (ref 80–99.9)
MONOCYTES ABSOLUTE: 1.26 E9/L (ref 0.1–0.95)
MONOCYTES RELATIVE PERCENT: 15.4 % (ref 2–12)
NEUTROPHILS ABSOLUTE: 4.07 E9/L (ref 1.8–7.3)
NEUTROPHILS RELATIVE PERCENT: 49.8 % (ref 43–80)
NITRITE, URINE: NEGATIVE
PDW BLD-RTO: 12.6 FL (ref 11.5–15)
PH UA: 5.5 (ref 5–9)
PLATELET # BLD: 223 E9/L (ref 130–450)
PMV BLD AUTO: 10 FL (ref 7–12)
POTASSIUM REFLEX MAGNESIUM: 4.5 MMOL/L (ref 3.5–5)
PROCALCITONIN: 0.81 NG/ML (ref 0–0.08)
PROTEIN UA: NEGATIVE MG/DL
RBC # BLD: 4.86 E12/L (ref 3.8–5.8)
RBC UA: NORMAL /HPF (ref 0–2)
SODIUM BLD-SCNC: 140 MMOL/L (ref 132–146)
SPECIFIC GRAVITY UA: 1.02 (ref 1–1.03)
TOTAL PROTEIN: 6.6 G/DL (ref 6.4–8.3)
TROPONIN, HIGH SENSITIVITY: 34 NG/L (ref 0–11)
TROPONIN, HIGH SENSITIVITY: 34 NG/L (ref 0–11)
UROBILINOGEN, URINE: 0.2 E.U./DL
WBC # BLD: 8.2 E9/L (ref 4.5–11.5)
WBC UA: NORMAL /HPF (ref 0–5)

## 2022-08-16 PROCEDURE — 82746 ASSAY OF FOLIC ACID SERUM: CPT

## 2022-08-16 PROCEDURE — 6360000002 HC RX W HCPCS: Performed by: EMERGENCY MEDICINE

## 2022-08-16 PROCEDURE — 83605 ASSAY OF LACTIC ACID: CPT

## 2022-08-16 PROCEDURE — 83036 HEMOGLOBIN GLYCOSYLATED A1C: CPT

## 2022-08-16 PROCEDURE — 84484 ASSAY OF TROPONIN QUANT: CPT

## 2022-08-16 PROCEDURE — 81001 URINALYSIS AUTO W/SCOPE: CPT

## 2022-08-16 PROCEDURE — 85025 COMPLETE CBC W/AUTO DIFF WBC: CPT

## 2022-08-16 PROCEDURE — 86900 BLOOD TYPING SEROLOGIC ABO: CPT

## 2022-08-16 PROCEDURE — 99285 EMERGENCY DEPT VISIT HI MDM: CPT

## 2022-08-16 PROCEDURE — 2060000000 HC ICU INTERMEDIATE R&B

## 2022-08-16 PROCEDURE — A4216 STERILE WATER/SALINE, 10 ML: HCPCS | Performed by: STUDENT IN AN ORGANIZED HEALTH CARE EDUCATION/TRAINING PROGRAM

## 2022-08-16 PROCEDURE — 6360000002 HC RX W HCPCS: Performed by: INTERNAL MEDICINE

## 2022-08-16 PROCEDURE — 93005 ELECTROCARDIOGRAM TRACING: CPT | Performed by: STUDENT IN AN ORGANIZED HEALTH CARE EDUCATION/TRAINING PROGRAM

## 2022-08-16 PROCEDURE — 83690 ASSAY OF LIPASE: CPT

## 2022-08-16 PROCEDURE — 74177 CT ABD & PELVIS W/CONTRAST: CPT

## 2022-08-16 PROCEDURE — 6370000000 HC RX 637 (ALT 250 FOR IP): Performed by: INTERNAL MEDICINE

## 2022-08-16 PROCEDURE — 86901 BLOOD TYPING SEROLOGIC RH(D): CPT

## 2022-08-16 PROCEDURE — 2580000003 HC RX 258: Performed by: STUDENT IN AN ORGANIZED HEALTH CARE EDUCATION/TRAINING PROGRAM

## 2022-08-16 PROCEDURE — 96361 HYDRATE IV INFUSION ADD-ON: CPT

## 2022-08-16 PROCEDURE — 6360000002 HC RX W HCPCS: Performed by: STUDENT IN AN ORGANIZED HEALTH CARE EDUCATION/TRAINING PROGRAM

## 2022-08-16 PROCEDURE — 80076 HEPATIC FUNCTION PANEL: CPT

## 2022-08-16 PROCEDURE — 2580000003 HC RX 258: Performed by: EMERGENCY MEDICINE

## 2022-08-16 PROCEDURE — C9113 INJ PANTOPRAZOLE SODIUM, VIA: HCPCS | Performed by: STUDENT IN AN ORGANIZED HEALTH CARE EDUCATION/TRAINING PROGRAM

## 2022-08-16 PROCEDURE — 70450 CT HEAD/BRAIN W/O DYE: CPT

## 2022-08-16 PROCEDURE — 82607 VITAMIN B-12: CPT

## 2022-08-16 PROCEDURE — 6360000004 HC RX CONTRAST MEDICATION: Performed by: RADIOLOGY

## 2022-08-16 PROCEDURE — 96375 TX/PRO/DX INJ NEW DRUG ADDON: CPT

## 2022-08-16 PROCEDURE — 86850 RBC ANTIBODY SCREEN: CPT

## 2022-08-16 PROCEDURE — 2580000003 HC RX 258: Performed by: INTERNAL MEDICINE

## 2022-08-16 PROCEDURE — 80048 BASIC METABOLIC PNL TOTAL CA: CPT

## 2022-08-16 PROCEDURE — 36415 COLL VENOUS BLD VENIPUNCTURE: CPT

## 2022-08-16 PROCEDURE — 72125 CT NECK SPINE W/O DYE: CPT

## 2022-08-16 PROCEDURE — C9113 INJ PANTOPRAZOLE SODIUM, VIA: HCPCS | Performed by: INTERNAL MEDICINE

## 2022-08-16 PROCEDURE — 84145 PROCALCITONIN (PCT): CPT

## 2022-08-16 PROCEDURE — 96374 THER/PROPH/DIAG INJ IV PUSH: CPT

## 2022-08-16 PROCEDURE — 76705 ECHO EXAM OF ABDOMEN: CPT

## 2022-08-16 PROCEDURE — A4216 STERILE WATER/SALINE, 10 ML: HCPCS | Performed by: INTERNAL MEDICINE

## 2022-08-16 RX ORDER — FENTANYL CITRATE 50 UG/ML
50 INJECTION, SOLUTION INTRAMUSCULAR; INTRAVENOUS ONCE
Status: COMPLETED | OUTPATIENT
Start: 2022-08-16 | End: 2022-08-16

## 2022-08-16 RX ORDER — 0.9 % SODIUM CHLORIDE 0.9 %
500 INTRAVENOUS SOLUTION INTRAVENOUS ONCE
Status: COMPLETED | OUTPATIENT
Start: 2022-08-16 | End: 2022-08-16

## 2022-08-16 RX ORDER — SODIUM CHLORIDE 9 MG/ML
INJECTION, SOLUTION INTRAVENOUS CONTINUOUS
Status: DISCONTINUED | OUTPATIENT
Start: 2022-08-16 | End: 2022-08-18 | Stop reason: HOSPADM

## 2022-08-16 RX ORDER — LANOLIN ALCOHOL/MO/W.PET/CERES
500 CREAM (GRAM) TOPICAL EVERY MORNING
Status: DISCONTINUED | OUTPATIENT
Start: 2022-08-17 | End: 2022-08-18 | Stop reason: HOSPADM

## 2022-08-16 RX ORDER — ONDANSETRON 2 MG/ML
4 INJECTION INTRAMUSCULAR; INTRAVENOUS ONCE
Status: COMPLETED | OUTPATIENT
Start: 2022-08-16 | End: 2022-08-16

## 2022-08-16 RX ORDER — RIVASTIGMINE TARTRATE 1.5 MG/1
1.5 CAPSULE ORAL 2 TIMES DAILY
Status: DISCONTINUED | OUTPATIENT
Start: 2022-08-16 | End: 2022-08-18 | Stop reason: HOSPADM

## 2022-08-16 RX ORDER — MEMANTINE HYDROCHLORIDE 10 MG/1
10 TABLET ORAL 2 TIMES DAILY
Status: DISCONTINUED | OUTPATIENT
Start: 2022-08-16 | End: 2022-08-18 | Stop reason: HOSPADM

## 2022-08-16 RX ORDER — VITAMIN E 268 MG
400 CAPSULE ORAL EVERY MORNING
Status: DISCONTINUED | OUTPATIENT
Start: 2022-08-17 | End: 2022-08-18 | Stop reason: HOSPADM

## 2022-08-16 RX ORDER — LOPERAMIDE HYDROCHLORIDE 2 MG/1
2 CAPSULE ORAL 4 TIMES DAILY PRN
COMMUNITY

## 2022-08-16 RX ORDER — M-VIT,TX,IRON,MINS/CALC/FOLIC 27MG-0.4MG
1 TABLET ORAL EVERY MORNING
Status: DISCONTINUED | OUTPATIENT
Start: 2022-08-17 | End: 2022-08-18 | Stop reason: HOSPADM

## 2022-08-16 RX ORDER — DIAZEPAM 5 MG/1
5 TABLET ORAL EVERY MORNING
Status: DISCONTINUED | OUTPATIENT
Start: 2022-08-17 | End: 2022-08-18 | Stop reason: HOSPADM

## 2022-08-16 RX ORDER — AMLODIPINE BESYLATE 10 MG/1
10 TABLET ORAL EVERY MORNING
Status: DISCONTINUED | OUTPATIENT
Start: 2022-08-17 | End: 2022-08-18 | Stop reason: HOSPADM

## 2022-08-16 RX ORDER — POTASSIUM CHLORIDE 20 MEQ/1
40 TABLET, EXTENDED RELEASE ORAL PRN
Status: DISCONTINUED | OUTPATIENT
Start: 2022-08-16 | End: 2022-08-18 | Stop reason: HOSPADM

## 2022-08-16 RX ORDER — ACETAMINOPHEN 325 MG/1
650 TABLET ORAL EVERY 4 HOURS PRN
Status: DISCONTINUED | OUTPATIENT
Start: 2022-08-16 | End: 2022-08-18 | Stop reason: HOSPADM

## 2022-08-16 RX ORDER — ATORVASTATIN CALCIUM 40 MG/1
40 TABLET, FILM COATED ORAL EVERY MORNING
Status: DISCONTINUED | OUTPATIENT
Start: 2022-08-17 | End: 2022-08-18 | Stop reason: HOSPADM

## 2022-08-16 RX ORDER — 0.9 % SODIUM CHLORIDE 0.9 %
1000 INTRAVENOUS SOLUTION INTRAVENOUS ONCE
Status: DISCONTINUED | OUTPATIENT
Start: 2022-08-16 | End: 2022-08-16

## 2022-08-16 RX ORDER — POTASSIUM CHLORIDE 7.45 MG/ML
10 INJECTION INTRAVENOUS PRN
Status: DISCONTINUED | OUTPATIENT
Start: 2022-08-16 | End: 2022-08-18 | Stop reason: HOSPADM

## 2022-08-16 RX ADMIN — FENTANYL CITRATE 50 MCG: 50 INJECTION, SOLUTION INTRAMUSCULAR; INTRAVENOUS at 15:33

## 2022-08-16 RX ADMIN — FENTANYL CITRATE 50 MCG: 50 INJECTION, SOLUTION INTRAMUSCULAR; INTRAVENOUS at 18:10

## 2022-08-16 RX ADMIN — MEMANTINE HYDROCHLORIDE 10 MG: 10 TABLET, FILM COATED ORAL at 20:08

## 2022-08-16 RX ADMIN — SODIUM CHLORIDE: 9 INJECTION, SOLUTION INTRAVENOUS at 20:12

## 2022-08-16 RX ADMIN — SODIUM CHLORIDE 80 MG: 9 INJECTION, SOLUTION INTRAMUSCULAR; INTRAVENOUS; SUBCUTANEOUS at 15:33

## 2022-08-16 RX ADMIN — SODIUM CHLORIDE 40 MG: 9 INJECTION INTRAMUSCULAR; INTRAVENOUS; SUBCUTANEOUS at 20:08

## 2022-08-16 RX ADMIN — FENTANYL CITRATE 50 MCG: 50 INJECTION, SOLUTION INTRAMUSCULAR; INTRAVENOUS at 17:54

## 2022-08-16 RX ADMIN — ONDANSETRON 4 MG: 2 INJECTION INTRAMUSCULAR; INTRAVENOUS at 15:34

## 2022-08-16 RX ADMIN — RIVASTIGMINE TARTRATE 1.5 MG: 1.5 CAPSULE ORAL at 20:08

## 2022-08-16 RX ADMIN — SODIUM CHLORIDE 500 ML: 9 INJECTION, SOLUTION INTRAVENOUS at 15:33

## 2022-08-16 RX ADMIN — IOPAMIDOL 75 ML: 755 INJECTION, SOLUTION INTRAVENOUS at 15:45

## 2022-08-16 ASSESSMENT — ENCOUNTER SYMPTOMS
SORE THROAT: 0
EYE REDNESS: 0
SHORTNESS OF BREATH: 0
EYE PAIN: 0
ABDOMINAL PAIN: 1
DIARRHEA: 0
NAUSEA: 1
SINUS PRESSURE: 0
EYE DISCHARGE: 0
WHEEZING: 0
BACK PAIN: 0
COUGH: 0
VOMITING: 0

## 2022-08-16 ASSESSMENT — PAIN SCALES - GENERAL
PAINLEVEL_OUTOF10: 7
PAINLEVEL_OUTOF10: 9
PAINLEVEL_OUTOF10: 0

## 2022-08-16 ASSESSMENT — PAIN DESCRIPTION - LOCATION: LOCATION: ABDOMEN

## 2022-08-16 ASSESSMENT — PAIN DESCRIPTION - ORIENTATION: ORIENTATION: RIGHT

## 2022-08-16 NOTE — ED TRIAGE NOTES
FIRST PROVIDER CONTACT ASSESSMENT NOTE       Department of Emergency Medicine                 First Provider Note            22  1:14 PM EDT    Date of Encounter: No admission date for patient encounter. Patient Name: Vickie Smith  : 1940  MRN: [id number]    Chief Complaint: Abdominal Pain, Diarrhea, and Emesis (Sent by Dr. Phil Coleman +N/V/D x 3 days, low bilateral abdominal pain)      History of Present Illness:   Vickie Smith is a 79 y.o. male who presents to the ED for N/V/D and abdominal pain. Lower abdominal pain. Black stool. Cramping pain. Sent in by PCP. Uncertain regarding blood thinners. Had black stool here in ED waiting room BR. Pt reports feeling very weak. Focused Physical Exam:  Diffusely tender across lower abdomen. VS:    ED Triage Vitals   BP Temp Temp src Pulse Resp SpO2 Height Weight   -- -- -- -- -- -- -- --        Physical Ex: Constitutional: Alert and non-toxic. Medical History:  has a past medical history of Abdominal aortic aneurysm (AAA) 3.0 cm to 5.5 cm in diameter in [facility]), Acute respiratory failure with hypoxia ([address] Aspiration pneumonia ([address] COVID-19, CVA (cerebral vascular accident) ([address] Dementia ([address] Depression, Heart murmur, Hematemesis, RAF (obstructive sleep apnea), Osteoarthritis, Prostate cancer ([address] Sepsis ([address] and Stage 3 chronic kidney disease ([address] Surgical History:  has a past surgical history that includes Prostate surgery; hernia repair; Cataract removal; Upper gastrointestinal endoscopy (N/A, 6/15/2019); and Pain management procedure (Bilateral, 3/8/2021). Social History:  reports that he quit smoking about 35 years ago. His smoking use included cigarettes. He started smoking about 63 years ago. He has a 34.00 pack-year smoking history. He has never used smokeless tobacco. He reports that he does not currently use alcohol. He reports that he does not use drugs.   Family History: family history includes Crohn's Disease in his sister; Heart Disease in his father.     Allergies: Oxycodone-acetaminophen and Vicodin [hydrocodone-acetaminophen]     Initial Plan of Care: Initiate Treatment-Testing, Proceed toTreatment Area When Bed Available for ED Attending/MLP to Continue Care      ---END OF FIRST PROVIDER CONTACT ASSESSMENT NOTE---  Electronically signed by Griselda Perry, PA-C   DD: 8/16/22

## 2022-08-16 NOTE — ED PROVIDER NOTES
The history is provided by the patient and medical records. 79-year-old male present emergency department complaint of melena stool today and diarrhea for the past several days. Followed up with his family doctor who advised on coming to the emergency department today for further evaluation. Patient also states he has been very fatigued and unsteady on his feet that has been going on for several days as well as increased frequency of falls. States last fall was approximately 2 weeks ago. Did hit his head at that time denies being on any blood thinning medications at this time. Otherwise patient denies any chest pain shortness of breath denies any changes to bowel bladder habits. Does elicit some right upper quadrant abdominal pain. Otherwise no other acute plaints this time. The patient presents with melena stool that has been going on for 1 day. These symptoms are moderate in severity. Symptoms are made better by nothnig. Symptoms are made worse by nothgn. Associated symptoms include none. Review of Systems   Constitutional:  Negative for chills and fever. HENT:  Negative for ear pain, sinus pressure and sore throat. Eyes:  Negative for pain, discharge and redness. Respiratory:  Negative for cough, shortness of breath and wheezing. Cardiovascular:  Negative for chest pain. Gastrointestinal:  Positive for abdominal pain and nausea. Negative for diarrhea and vomiting. Genitourinary:  Negative for dysuria and frequency. Musculoskeletal:  Negative for arthralgias and back pain. Skin:  Negative for rash and wound. Neurological:  Negative for weakness and headaches. Hematological:  Negative for adenopathy. Psychiatric/Behavioral:  Negative for agitation and behavioral problems. All other systems reviewed and are negative. Physical Exam  Vitals and nursing note reviewed. Constitutional:       General: He is not in acute distress. Appearance: Normal appearance.  He is well-developed. He is not toxic-appearing. HENT:      Head: Normocephalic and atraumatic. Eyes:      General: No scleral icterus. Conjunctiva/sclera: Conjunctivae normal.   Cardiovascular:      Rate and Rhythm: Normal rate and regular rhythm. Heart sounds: Normal heart sounds. No murmur heard. Pulmonary:      Effort: Pulmonary effort is normal. No respiratory distress. Breath sounds: Normal breath sounds. No wheezing or rales. Abdominal:      General: Bowel sounds are normal.      Palpations: Abdomen is soft. Tenderness: There is no abdominal tenderness. There is no guarding or rebound. Musculoskeletal:         General: No swelling, tenderness or deformity. Cervical back: Normal range of motion and neck supple. Skin:     General: Skin is warm and dry. Coloration: Skin is not jaundiced. Neurological:      General: No focal deficit present. Mental Status: He is alert and oriented to person, place, and time. Cranial Nerves: No cranial nerve deficit. Sensory: No sensory deficit. Motor: No weakness. Psychiatric:         Mood and Affect: Mood normal.        Procedures     MDM     Amount and/or Complexity of Data Reviewed  Clinical lab tests: reviewed  Tests in the radiology section of CPT®: reviewed  Tests in the medicine section of CPT®: reviewed       78-year-old male presents emerged department complaint of diarrhea black stools and unsteady on his feet. Patient CT scan of the head and neck unremarkable for any acute pathology did order due to to secondary the patient having frequent falls last fall he hit his head approximately 2 weeks ago. Patient's laboratory work-up does show a stable hemoglobin and troponin slight elevation of her delta and acceptable range EKG was stable no acute ST elevation or depression. Rectal exam was positive for melena stool and Hemoccult positive. Concern for GI bleed at this time patient given Protonix.   Patient's CAT scan of the abdomen and pelvis was unremarkable for any acute pathology except for some diarrheal illness. Right upper quadrant ultrasound also unremarkable. Patient will be admitted to the hospital due to his concern for GI bleed at this time. Family and patient agreeable this plan.                  --------------------------------------------- PAST HISTORY ---------------------------------------------  Past Medical History:  has a past medical history of Abdominal aortic aneurysm (AAA) 3.0 cm to 5.5 cm in diameter in male [facility]), Acute respiratory failure with hypoxia ([address] Alzheimer's dementia without behavioral disturbance ([address] Aspiration pneumonia ([address] Balance problem, Carpal tunnel syndrome of left wrist, Cervical radiculopathy, COVID-19, CVA (cerebral vascular accident) ([address] DDD (degenerative disc disease), cervical, DDD (degenerative disc disease), lumbar, Dementia ([address] Depression, Heart murmur, Hematemesis, History of stroke, L-S radiculopathy, Lactic acidosis, Left arm numbness, Lumbosacral spondylosis without myelopathy, Neuropathy, RAF (obstructive sleep apnea), Osteoarthritis, Peripheral polyneuropathy, Persistent postural-perceptual dizziness, Pneumonia, Prostate cancer ([address] SBO (small bowel obstruction) ([address] Sepsis ([address] Sleep apnea, Snoring, Spinal stenosis of lumbar region, and Stage 3 chronic kidney disease ([address] Past Surgical History:  has a past surgical history that includes Prostate surgery; hernia repair; Cataract removal; Upper gastrointestinal endoscopy (N/A, 6/15/2019); and Pain management procedure (Bilateral, 3/8/2021). Social History:  reports that he quit smoking about 35 years ago. His smoking use included cigarettes. He started smoking about 63 years ago. He has a 34.00 pack-year smoking history. He has never used smokeless tobacco. He reports that he does not currently use alcohol. He reports that he does not use drugs.     Family History: family history includes Crohn's Disease in his sister; Heart Disease in his father. The patients home medications have been reviewed.     Allergies: Oxycodone-acetaminophen and Vicodin [hydrocodone-acetaminophen]    -------------------------------------------------- RESULTS -------------------------------------------------    LABS:  Results for orders placed or performed during the hospital encounter of 08/16/22   CBC with Auto Differential   Result Value Ref Range    WBC 8.2 4.5 - 11.5 E9/L    RBC 4.86 3.80 - 5.80 E12/L    Hemoglobin 15.8 12.5 - 16.5 g/dL    Hematocrit 47.5 37.0 - 54.0 %    MCV 97.7 80.0 - 99.9 fL    MCH 32.5 26.0 - 35.0 pg    MCHC 33.3 32.0 - 34.5 %    RDW 12.6 11.5 - 15.0 fL    Platelets 158 352 - 323 E9/L    MPV 10.0 7.0 - 12.0 fL    Neutrophils % 49.8 43.0 - 80.0 %    Immature Granulocytes % 0.2 0.0 - 5.0 %    Lymphocytes % 31.6 20.0 - 42.0 %    Monocytes % 15.4 (H) 2.0 - 12.0 %    Eosinophils % 2.9 0.0 - 6.0 %    Basophils % 0.1 0.0 - 2.0 %    Neutrophils Absolute 4.07 1.80 - 7.30 E9/L    Immature Granulocytes # 0.02 E9/L    Lymphocytes Absolute 2.59 1.50 - 4.00 E9/L    Monocytes Absolute 1.26 (H) 0.10 - 0.95 E9/L    Eosinophils Absolute 0.24 0.05 - 0.50 E9/L    Basophils Absolute 0.01 0.00 - 0.20 R2/M   Basic Metabolic Panel w/ Reflex to MG   Result Value Ref Range    Sodium 140 132 - 146 mmol/L    Potassium reflex Magnesium 4.5 3.5 - 5.0 mmol/L    Chloride 105 98 - 107 mmol/L    CO2 25 22 - 29 mmol/L    Anion Gap 10 7 - 16 mmol/L    Glucose 116 (H) 74 - 99 mg/dL    BUN 38 (H) 6 - 23 mg/dL    Creatinine 1.5 (H) 0.7 - 1.2 mg/dL    GFR Non-African American 45 >=60 mL/min/1.73    GFR African American 54     Calcium 9.0 8.6 - 10.2 mg/dL   Hepatic Function Panel   Result Value Ref Range    Total Protein 6.6 6.4 - 8.3 g/dL    Albumin 4.1 3.5 - 5.2 g/dL    Alkaline Phosphatase 105 40 - 129 U/L    ALT 11 0 - 40 U/L    AST 13 0 - 39 U/L    Total Bilirubin 1.3 (H) 0.0 - 1.2 mg/dL    Bilirubin, Direct 0.2 0.0 - 0.3 mg/dL    Bilirubin, Indirect 1.1 (H) 0.0 - 1.0 mg/dL   Troponin   Result Value Ref Range    Troponin, High Sensitivity 34 (H) 0 - 11 ng/L   Lipase   Result Value Ref Range    Lipase 9 (L) 13 - 60 U/L   Lactic Acid   Result Value Ref Range    Lactic Acid 2.2 0.5 - 2.2 mmol/L   Troponin   Result Value Ref Range    Troponin, High Sensitivity 34 (H) 0 - 11 ng/L   EKG 12 Lead   Result Value Ref Range    Ventricular Rate 89 BPM    Atrial Rate 89 BPM    P-R Interval 186 ms    QRS Duration 88 ms    Q-T Interval 350 ms    QTc Calculation (Bazett) 425 ms    P Axis 44 degrees    R Axis 17 degrees    T Axis 40 degrees   TYPE AND SCREEN   Result Value Ref Range    ABO/Rh B POS     Antibody Screen NEG        RADIOLOGY:  CT ABDOMEN PELVIS W IV CONTRAST Additional Contrast? None   Final Result   1. Liquid stool in the colon indicates a diarrheal illness. No bowel wall   thickening or obstruction noted. 2. Apparent mural thickening of the stomach may be an artifact of under   distension or signify gastritis. 3. Aneurysmal dilation of the infrarenal aorta to 3.5 cm. Per the   recommendations of the [employer] of Radiology, imaging follow-up is   recommended every 2 years. 4. Calcified pleural and diaphragmatic plaques indicating asbestos exposure. CT Head WO Contrast   Final Result   No acute intracranial abnormality. There is age-appropriate atrophy,   small-vessel ischemic disease, old cortical infarct in the left occipital   lobe, and lacunar CVAs in the left basal ganglia and right centrum semiovale. CT cervical spine. There is no acute fracture or dislocation in the cervical spine. The   prevertebral soft tissues are normal.  Diffuse degenerative changes are   identified from C2-T1 with osteophytes and multilevel disc bulges. There is   scarring and apical pleural thickening partially identified. Impression      No acute fractures. Diffuse degenerative changes from C2-T1. CT Cervical Spine WO Contrast   Final Result   No acute intracranial abnormality. There is age-appropriate atrophy,   small-vessel ischemic disease, old cortical infarct in the left occipital   lobe, and lacunar CVAs in the left basal ganglia and right centrum semiovale. CT cervical spine. There is no acute fracture or dislocation in the cervical spine. The   prevertebral soft tissues are normal.  Diffuse degenerative changes are   identified from C2-T1 with osteophytes and multilevel disc bulges. There is   scarring and apical pleural thickening partially identified. Impression      No acute fractures. Diffuse degenerative changes from C2-T1. US GALLBLADDER RUQ   Final Result   Unremarkable right upper quadrant ultrasound. EKG:  This EKG is signed and interpreted by me. Rate: 89  Rhythm: Sinus  Interpretation: No acute ST elevation depression sinus rhythm normal axis stable intervals  Comparison: stable as compared to patient's most recent EKG      ------------------------- NURSING NOTES AND VITALS REVIEWED ---------------------------  Date / Time Roomed:  8/16/2022  2:08 PM  ED Bed Assignment:  15/15    The nursing notes within the ED encounter and vital signs as below have been reviewed. Patient Vitals for the past 24 hrs:   BP Temp Temp src Pulse Resp SpO2 Height Weight   08/16/22 1542 112/72 -- -- 87 16 95 % -- --   08/16/22 1312 97/62 97.7 °F (36.5 °C) Temporal 93 16 94 % 5' 11\" (1.803 m) 187 lb (84.8 kg)       Oxygen Saturation Interpretation: Normal    ------------------------------------------ PROGRESS NOTES ------------------------------------------  Re-evaluation(s):  Time: 7337  Patients symptoms show no change  Repeat physical examination is not changed    Counseling:  I have spoken with the patient and discussed todays results, in addition to providing specific details for the plan of care and counseling regarding the diagnosis and prognosis.   Their questions are answered at this time and they are agreeable with the plan of admission.    --------------------------------- ADDITIONAL PROVIDER NOTES ---------------------------------  Consultations:  . Spoke with Dr. Fabiola Marquez. Discussed case. They will admit the patient. This patient's ED course included: a personal history and physicial examination, re-evaluation prior to disposition, multiple bedside re-evaluations, IV medications, cardiac monitoring, and continuous pulse oximetry    This patient has remained hemodynamically stable during their ED course. Diagnosis:  1. Gastrointestinal hemorrhage, unspecified gastrointestinal hemorrhage type        Disposition:  Patient's disposition: Admit to tele    Patient's condition is stable.          Alexandrea Melchor MD  Resident  08/16/22 9305

## 2022-08-16 NOTE — PROGRESS NOTES
Database initiated pharmacy and medications verified with the patients wife and daughter. He is alert to person and place d/t dementia. He is from home with his wife. He uses no assistive devices and is RA at baseline.

## 2022-08-17 ENCOUNTER — APPOINTMENT (OUTPATIENT)
Dept: MRI IMAGING | Age: 82
DRG: 379 | End: 2022-08-17
Payer: MEDICARE

## 2022-08-17 ENCOUNTER — APPOINTMENT (OUTPATIENT)
Dept: ULTRASOUND IMAGING | Age: 82
DRG: 379 | End: 2022-08-17
Payer: MEDICARE

## 2022-08-17 ENCOUNTER — ANESTHESIA EVENT (OUTPATIENT)
Dept: ENDOSCOPY | Age: 82
DRG: 379 | End: 2022-08-17
Payer: MEDICARE

## 2022-08-17 ENCOUNTER — ANESTHESIA (OUTPATIENT)
Dept: ENDOSCOPY | Age: 82
DRG: 379 | End: 2022-08-17
Payer: MEDICARE

## 2022-08-17 PROBLEM — I65.21 CAROTID STENOSIS, RIGHT: Status: ACTIVE | Noted: 2022-08-17

## 2022-08-17 LAB
ALBUMIN SERPL-MCNC: 3.1 G/DL (ref 3.5–5.2)
ALP BLD-CCNC: 84 U/L (ref 40–129)
ALT SERPL-CCNC: 8 U/L (ref 0–40)
ANION GAP SERPL CALCULATED.3IONS-SCNC: 7 MMOL/L (ref 7–16)
AST SERPL-CCNC: 12 U/L (ref 0–39)
BILIRUB SERPL-MCNC: 1 MG/DL (ref 0–1.2)
BILIRUBIN DIRECT: 0.3 MG/DL (ref 0–0.3)
BILIRUBIN, INDIRECT: 0.7 MG/DL (ref 0–1)
BUN BLDV-MCNC: 29 MG/DL (ref 6–23)
CALCIUM IONIZED: 1.26 MMOL/L (ref 1.15–1.33)
CALCIUM SERPL-MCNC: 8.3 MG/DL (ref 8.6–10.2)
CHLORIDE BLD-SCNC: 106 MMOL/L (ref 98–107)
CHOLESTEROL, TOTAL: 82 MG/DL (ref 0–199)
CO2: 22 MMOL/L (ref 22–29)
CREAT SERPL-MCNC: 1.3 MG/DL (ref 0.7–1.2)
FERRITIN: 245 NG/ML
FOLATE: 17.3 NG/ML (ref 4.8–24.2)
GFR AFRICAN AMERICAN: >60
GFR NON-AFRICAN AMERICAN: 53 ML/MIN/1.73
GLUCOSE BLD-MCNC: 98 MG/DL (ref 74–99)
HCT VFR BLD CALC: 38.8 % (ref 37–54)
HCT VFR BLD CALC: 39.3 % (ref 37–54)
HDLC SERPL-MCNC: 25 MG/DL
HEMOGLOBIN: 13 G/DL (ref 12.5–16.5)
HEMOGLOBIN: 13.2 G/DL (ref 12.5–16.5)
IRON SATURATION: 38 % (ref 20–55)
IRON: 76 MCG/DL (ref 59–158)
LACTIC ACID, SEPSIS: 0.8 MMOL/L (ref 0.5–1.9)
LDL CHOLESTEROL CALCULATED: 37 MG/DL (ref 0–99)
MAGNESIUM: 1.8 MG/DL (ref 1.6–2.6)
PHOSPHORUS: 3 MG/DL (ref 2.5–4.5)
POTASSIUM SERPL-SCNC: 4.2 MMOL/L (ref 3.5–5)
PRO-BNP: 188 PG/ML (ref 0–450)
SEDIMENTATION RATE, ERYTHROCYTE: 2 MM/HR (ref 0–15)
SODIUM BLD-SCNC: 135 MMOL/L (ref 132–146)
TOTAL IRON BINDING CAPACITY: 200 MCG/DL (ref 250–450)
TOTAL PROTEIN: 5.2 G/DL (ref 6.4–8.3)
TRIGL SERPL-MCNC: 98 MG/DL (ref 0–149)
TSH SERPL DL<=0.05 MIU/L-ACNC: 2.55 UIU/ML (ref 0.27–4.2)
URIC ACID, SERUM: 7.3 MG/DL (ref 3.4–7)
VITAMIN B-12: 676 PG/ML (ref 211–946)
VLDLC SERPL CALC-MCNC: 20 MG/DL

## 2022-08-17 PROCEDURE — 82330 ASSAY OF CALCIUM: CPT

## 2022-08-17 PROCEDURE — A4216 STERILE WATER/SALINE, 10 ML: HCPCS | Performed by: SURGERY

## 2022-08-17 PROCEDURE — 70551 MRI BRAIN STEM W/O DYE: CPT

## 2022-08-17 PROCEDURE — 2060000000 HC ICU INTERMEDIATE R&B

## 2022-08-17 PROCEDURE — 6360000002 HC RX W HCPCS: Performed by: NURSE ANESTHETIST, CERTIFIED REGISTERED

## 2022-08-17 PROCEDURE — 80061 LIPID PANEL: CPT

## 2022-08-17 PROCEDURE — 99222 1ST HOSP IP/OBS MODERATE 55: CPT | Performed by: SURGERY

## 2022-08-17 PROCEDURE — 83880 ASSAY OF NATRIURETIC PEPTIDE: CPT

## 2022-08-17 PROCEDURE — 83540 ASSAY OF IRON: CPT

## 2022-08-17 PROCEDURE — 3700000000 HC ANESTHESIA ATTENDED CARE: Performed by: SURGERY

## 2022-08-17 PROCEDURE — 83550 IRON BINDING TEST: CPT

## 2022-08-17 PROCEDURE — 83735 ASSAY OF MAGNESIUM: CPT

## 2022-08-17 PROCEDURE — 80076 HEPATIC FUNCTION PANEL: CPT

## 2022-08-17 PROCEDURE — 84100 ASSAY OF PHOSPHORUS: CPT

## 2022-08-17 PROCEDURE — 2709999900 HC NON-CHARGEABLE SUPPLY: Performed by: SURGERY

## 2022-08-17 PROCEDURE — 82728 ASSAY OF FERRITIN: CPT

## 2022-08-17 PROCEDURE — 83605 ASSAY OF LACTIC ACID: CPT

## 2022-08-17 PROCEDURE — 97165 OT EVAL LOW COMPLEX 30 MIN: CPT

## 2022-08-17 PROCEDURE — 3609012400 HC EGD TRANSORAL BIOPSY SINGLE/MULTIPLE: Performed by: SURGERY

## 2022-08-17 PROCEDURE — 3700000001 HC ADD 15 MINUTES (ANESTHESIA): Performed by: SURGERY

## 2022-08-17 PROCEDURE — 6370000000 HC RX 637 (ALT 250 FOR IP): Performed by: SURGERY

## 2022-08-17 PROCEDURE — 85651 RBC SED RATE NONAUTOMATED: CPT

## 2022-08-17 PROCEDURE — 7100000011 HC PHASE II RECOVERY - ADDTL 15 MIN: Performed by: SURGERY

## 2022-08-17 PROCEDURE — 88342 IMHCHEM/IMCYTCHM 1ST ANTB: CPT

## 2022-08-17 PROCEDURE — 6360000002 HC RX W HCPCS: Performed by: SURGERY

## 2022-08-17 PROCEDURE — 7100000010 HC PHASE II RECOVERY - FIRST 15 MIN: Performed by: SURGERY

## 2022-08-17 PROCEDURE — 85014 HEMATOCRIT: CPT

## 2022-08-17 PROCEDURE — 6370000000 HC RX 637 (ALT 250 FOR IP): Performed by: INTERNAL MEDICINE

## 2022-08-17 PROCEDURE — 88305 TISSUE EXAM BY PATHOLOGIST: CPT

## 2022-08-17 PROCEDURE — 80048 BASIC METABOLIC PNL TOTAL CA: CPT

## 2022-08-17 PROCEDURE — 84443 ASSAY THYROID STIM HORMONE: CPT

## 2022-08-17 PROCEDURE — 2580000003 HC RX 258: Performed by: SURGERY

## 2022-08-17 PROCEDURE — 2580000003 HC RX 258: Performed by: ANESTHESIOLOGY

## 2022-08-17 PROCEDURE — 93880 EXTRACRANIAL BILAT STUDY: CPT

## 2022-08-17 PROCEDURE — 72141 MRI NECK SPINE W/O DYE: CPT

## 2022-08-17 PROCEDURE — 84550 ASSAY OF BLOOD/URIC ACID: CPT

## 2022-08-17 PROCEDURE — 0DJ08ZZ INSPECTION OF UPPER INTESTINAL TRACT, VIA NATURAL OR ARTIFICIAL OPENING ENDOSCOPIC: ICD-10-PCS | Performed by: SURGERY

## 2022-08-17 PROCEDURE — C9113 INJ PANTOPRAZOLE SODIUM, VIA: HCPCS | Performed by: SURGERY

## 2022-08-17 PROCEDURE — 97161 PT EVAL LOW COMPLEX 20 MIN: CPT

## 2022-08-17 PROCEDURE — 85018 HEMOGLOBIN: CPT

## 2022-08-17 PROCEDURE — 36415 COLL VENOUS BLD VENIPUNCTURE: CPT

## 2022-08-17 RX ORDER — FENTANYL CITRATE 50 UG/ML
25 INJECTION, SOLUTION INTRAMUSCULAR; INTRAVENOUS EVERY 5 MIN PRN
Status: DISCONTINUED | OUTPATIENT
Start: 2022-08-17 | End: 2022-08-18 | Stop reason: HOSPADM

## 2022-08-17 RX ORDER — SODIUM CHLORIDE 0.9 % (FLUSH) 0.9 %
5-40 SYRINGE (ML) INJECTION EVERY 12 HOURS SCHEDULED
Status: DISCONTINUED | OUTPATIENT
Start: 2022-08-17 | End: 2022-08-18 | Stop reason: HOSPADM

## 2022-08-17 RX ORDER — SODIUM CHLORIDE 9 MG/ML
INJECTION, SOLUTION INTRAVENOUS PRN
Status: DISCONTINUED | OUTPATIENT
Start: 2022-08-17 | End: 2022-08-18 | Stop reason: HOSPADM

## 2022-08-17 RX ORDER — ONDANSETRON 2 MG/ML
4 INJECTION INTRAMUSCULAR; INTRAVENOUS
Status: ACTIVE | OUTPATIENT
Start: 2022-08-17 | End: 2022-08-17

## 2022-08-17 RX ORDER — SODIUM CHLORIDE 0.9 % (FLUSH) 0.9 %
5-40 SYRINGE (ML) INJECTION PRN
Status: DISCONTINUED | OUTPATIENT
Start: 2022-08-17 | End: 2022-08-18 | Stop reason: HOSPADM

## 2022-08-17 RX ORDER — FENTANYL CITRATE 50 UG/ML
50 INJECTION, SOLUTION INTRAMUSCULAR; INTRAVENOUS EVERY 5 MIN PRN
Status: DISCONTINUED | OUTPATIENT
Start: 2022-08-17 | End: 2022-08-18 | Stop reason: HOSPADM

## 2022-08-17 RX ORDER — SUCRALFATE 1 G/1
1 TABLET ORAL EVERY 6 HOURS SCHEDULED
Status: DISCONTINUED | OUTPATIENT
Start: 2022-08-17 | End: 2022-08-18 | Stop reason: HOSPADM

## 2022-08-17 RX ORDER — PROPOFOL 10 MG/ML
INJECTION, EMULSION INTRAVENOUS PRN
Status: DISCONTINUED | OUTPATIENT
Start: 2022-08-17 | End: 2022-08-17 | Stop reason: SDUPTHER

## 2022-08-17 RX ADMIN — SODIUM CHLORIDE 40 MG: 9 INJECTION INTRAMUSCULAR; INTRAVENOUS; SUBCUTANEOUS at 21:58

## 2022-08-17 RX ADMIN — PROPOFOL 40 MG: 10 INJECTION, EMULSION INTRAVENOUS at 16:50

## 2022-08-17 RX ADMIN — RIVASTIGMINE TARTRATE 1.5 MG: 1.5 CAPSULE ORAL at 21:58

## 2022-08-17 RX ADMIN — ATORVASTATIN CALCIUM 40 MG: 40 TABLET, FILM COATED ORAL at 09:20

## 2022-08-17 RX ADMIN — SUCRALFATE 1 G: 1 TABLET ORAL at 19:28

## 2022-08-17 RX ADMIN — MEMANTINE HYDROCHLORIDE 10 MG: 10 TABLET, FILM COATED ORAL at 21:58

## 2022-08-17 RX ADMIN — SODIUM CHLORIDE, PRESERVATIVE FREE 10 ML: 5 INJECTION INTRAVENOUS at 21:58

## 2022-08-17 RX ADMIN — RIVASTIGMINE TARTRATE 1.5 MG: 1.5 CAPSULE ORAL at 09:29

## 2022-08-17 RX ADMIN — SODIUM CHLORIDE 40 MG: 9 INJECTION INTRAMUSCULAR; INTRAVENOUS; SUBCUTANEOUS at 09:20

## 2022-08-17 RX ADMIN — MEMANTINE HYDROCHLORIDE 10 MG: 10 TABLET, FILM COATED ORAL at 09:20

## 2022-08-17 RX ADMIN — PROPOFOL 20 MG: 10 INJECTION, EMULSION INTRAVENOUS at 16:51

## 2022-08-17 RX ADMIN — DIAZEPAM 5 MG: 5 TABLET ORAL at 09:20

## 2022-08-17 RX ADMIN — PROPOFOL 20 MG: 10 INJECTION, EMULSION INTRAVENOUS at 16:53

## 2022-08-17 RX ADMIN — PROPOFOL 40 MG: 10 INJECTION, EMULSION INTRAVENOUS at 16:49

## 2022-08-17 ASSESSMENT — PAIN SCALES - GENERAL
PAINLEVEL_OUTOF10: 0

## 2022-08-17 ASSESSMENT — PAIN - FUNCTIONAL ASSESSMENT
PAIN_FUNCTIONAL_ASSESSMENT: 0-10
PAIN_FUNCTIONAL_ASSESSMENT: 0-10

## 2022-08-17 NOTE — OP NOTE
Operative Note      Patient: Lajean Schlatter  YOB: 1940  MRN: [id number]    Date of Procedure: 8/17/2022    Pre-Op Diagnosis: Pain [R52]    Post-Op Diagnosis: Glycogenic acanthosis, 3 cm sliding hiatal hernia,  superfical gastric ulcer, normal duodenum. Procedure(s):  EGD BIOPSY    Surgeon(s):  Junior Bryan MD    Assistant:   Resident: Julieta Hwang MD    Anesthesia: Monitor Anesthesia Care    Estimated Blood Loss (mL): Minimal    Complications: None    Specimens:   ID Type Source Tests Collected by Time Destination   A : ANTRUM Tissue Stomach SURGICAL PATHOLOGY Junior Bryan MD 8/17/2022 1652        Implants:  * No implants in log *      Drains: * No LDAs found *    Findings: Glycogenic acanthosis of the esophagus, superficial gastric ulcerations, normal duodenum, no signs of bleeding. BRIEF HISTORY:  This is a 79 y.o. male who presents with the complaint of abdominal pain. It was recommended the patient undergo an esophagogastroduodenoscopy. The risks/benefits/alternatives/expected outcomes were explained the the patient. The patient verbalized understanding and agreed to proceed. PROCEDURE:  The patient was brought into the endoscopy suite and placed in the left lateral decubitus position. A bite block was placed in the patients mouth. After the initiation of LMAC anesthesia, a gastroscope was inserted into the patient's mouth and passed into the esophagus and into the stomach. Immediately upon entering the stomach the note of mild gastritis with superficial gastric ulcerations was made. The scope was advanced through the pylorus into the first and second portion of the duodenum making the note of a normal duodenum. The scope was then withdrawn back into the stomach where it was retroflexed. There was 3 cm hiatal hernia noted. The scope was then straightened.  The scope was then withdrawn the entire length of the esophagus, making the note of a normal esophagus without masses, ulcerations, or lesions noted. The scope was withdrawn entirely. The patient tolerated the procedure well and there were no complications. Dr. Obdulio Healy was present for the procedure.     Electronically signed by Lance Anne MD on 8/17/2022 at 4:58 PM

## 2022-08-17 NOTE — PROGRESS NOTES
Physical Therapy  Facility/Department: [facility]  Physical Therapy Initial Assessment    Name: Daphne Ni  : 1940  MRN: [id number]  Date of Service: 2022      Patient Diagnosis(es): The encounter diagnosis was Gastrointestinal hemorrhage, unspecified gastrointestinal hemorrhage type. Past Medical History:  has a past medical history of Abdominal aortic aneurysm (AAA) 3.0 cm to 5.5 cm in diameter in male [facility]), Acute respiratory failure with hypoxia ([address] Alzheimer's dementia without behavioral disturbance ([address] Aspiration pneumonia ([address] Balance problem, Carpal tunnel syndrome of left wrist, Cervical radiculopathy, COVID-19, CVA (cerebral vascular accident) ([address] DDD (degenerative disc disease), cervical, DDD (degenerative disc disease), lumbar, Dementia ([address] Depression, Heart murmur, Hematemesis, History of stroke, L-S radiculopathy, Lactic acidosis, Left arm numbness, Lumbosacral spondylosis without myelopathy, Neuropathy, RAF (obstructive sleep apnea), Osteoarthritis, Peripheral polyneuropathy, Persistent postural-perceptual dizziness, Pneumonia, Prostate cancer ([address] SBO (small bowel obstruction) ([address] Sepsis ([address] Sleep apnea, Snoring, Spinal stenosis of lumbar region, and Stage 3 chronic kidney disease ([address] Past Surgical History:  has a past surgical history that includes Prostate surgery; hernia repair; Cataract removal; Upper gastrointestinal endoscopy (N/A, 6/15/2019); and Pain management procedure (Bilateral, 3/8/2021). Evaluating Therapist: Edilberto Rebollar PT      Room #:  0621/0621-A  Diagnosis:  GI bleed [K92.2]  Gastrointestinal hemorrhage, unspecified gastrointestinal hemorrhage type [K92.2]  PMHx/PSHx:  CVA, AAA, neuropathy  Precautions:  falls, alarm      Social:  Pt lives with wife in a 1 floor plan 2 steps with rail to enter. Prior to admission independent without device in home.  Uses ww when out     Initial Evaluation  Date: 22 Treatment      Short Term/ Long Term   Goals   Was pt agreeable to Eval/treatment? yes     Does pt have pain? No c/o pain     Bed Mobility  Rolling: SBA  Supine to sit: SBA  Sit to supine: NT  Scooting: SBA  indeendent   Transfers Sit to stand: CGA  Stand to sit: CGA  Stand pivot: CGA  independent   Ambulation    100 feet with ww with CGA  150 feet with ww with supervision   Stair Negotiation  Ascended and descended  NT   2 steps with 1 rail with supervision   LE strength     4-/5    4+/5   balance      fair     AM-PAC Raw score               17/24         Pt is alert and grossly Oriented   LE ROM: WFL  Edema: none  Endurance: fair+  Chair alarm: yes     ASSESSMENT:    Pt displays functional ability as noted in the objective portion of this evaluation. Patient education  Pt educated on PT objectives    Patient response to education:   Pt verbalized understanding Pt demonstrated skill Pt requires further education in this area   yes           Comments:  Pt instructed on safety with transfers and walker safety. Cues for hand placement with transfers      Pt's/ family goals   1. To return home    Conditions Requiring Skilled Therapeutic Intervention:    [x]Decreased strength     []Decreased ROM  [x]Decreased functional mobility  [x]Decreased balance   [x]Decreased endurance   []Decreased posture  []Decreased sensation  []Decreased coordination   []Decreased vision  [x]Decreased safety awareness   []Increased pain       Patient and or family understand(s) diagnosis, prognosis, and plan of care.     Prognosis is good for reaching above PT goals    PHYSICAL THERAPY PLAN OF CARE:    PT POC is established based on physician order and patient diagnosis     Referring provider/PT Order: Mabel Almodovar, DO/ PT eval and treat      Current Treatment Recommendations:     [x] Strengthening to improve independence with functional mobility   [] ROM to improve independence with functional mobility   [x] Balance Training to improve static/dynamic balance and to reduce fall risk  [x] Endurance Training to improve activity tolerance during functional mobility   [x] Transfer Training to improve safety and independence with all functional transfers   [x] Gait Training to improve gait mechanics, endurance and assess need for appropriate assistive device  [x] Stair Training in preparation for safe discharge home and/or into the community   [] Positioning to prevent skin breakdown and contractures  [x] Safety and Education Training   5 Patient/Caregiver Education   [] HEP  [] Other     PT long term treatment goals are located in above grid    Frequency of treatments: 2-5x/week x 5 days. Time in  1125  Time out  1140        Evaluation Time includes thorough review of current medical information, gathering information on past medical history/social history and prior level of function, completion of standardized testing/informal observation of tasks, assessment of data and education on plan of care and goals.       CPT codes:  [x] Low Complexity PT evaluation 06091  [] Moderate Complexity PT evaluation 23013  [] High Complexity PT evaluation 00652  [] PT Re-evaluation 74734  [] Gait training 47579 minutes  [] Manual therapy 28871 minutes  [] Therapeutic activities 57206 minutes  [] Therapeutic exercises 29462 minutes  [] Neuromuscular reeducation 78943 minutes     [facility] [id number]

## 2022-08-17 NOTE — PROGRESS NOTES
Occupational Therapy  OCCUPATIONAL THERAPY INITIAL EVALUATION    BON Miguelina Foy [place], [place]        [id number]                                                  Patient Name: Thompson Kawasaki    MRN: [id number]    : 1940    Room: [address] [place] [address]      Evaluating OT: Reyna Reno OTR/L [id number]      Referring Provider: Venkata Staples DO    Specific Provider Orders/Date:OT eval and treat 22      Diagnosis:  GI bleed [K92.2]  Gastrointestinal hemorrhage, unspecified gastrointestinal hemorrhage type [K92.2]      Pertinent Medical History: Alzheimer's dementia, CVA, neuropathy, OA, lumbosacral spondylosis, prostate CA, CKD       Precautions:  Fall Risk, alarm, c-diff contact     Assessment of current deficits    [x] Functional mobility  [x]ADLs  [x] Strength               [x]Cognition    [x] Functional transfers   [x] IADLs         [x] Safety Awareness   [x]Endurance    [] Fine Coordination              [x] Balance      [] Vision/perception   [x]Sensation     []Gross Motor Coordination  [] ROM  [] Delirium                   [] Motor Control     OT PLAN OF CARE   OT POC based on physician orders, patient diagnosis and results of clinical assessment    Frequency/Duration 2-4 days/wk for 2 weeks PRN   Specific OT Treatment Interventions to include:   * Instruction/training on adapted ADL techniques and AE recommendations to increase functional independence within precautions       * Training on energy conservation strategies, correct breathing pattern and techniques to improve independence/tolerance for self-care routine  * Functional transfer/mobility training/DME recommendations for increased independence, safety, and fall prevention  * Patient/Family education to increase follow through with safety techniques and functional independence  * Recommendation of environmental modifications for increased safety with functional transfers/mobility and ADLs  * Cognitive retraining/development of therapeutic activities to improve problem solving, judgement, memory, and attention for increased safety/participation in ADL/IADL tasks  * Therapeutic exercise to improve motor endurance, ROM, and functional strength for ADLs/functional transfers  * Therapeutic activities to facilitate/challenge dynamic balance, stand tolerance for increased safety and independence with ADLs        Recommended Adaptive Equipment: TBD     Home Living: Pt lives with wife in 2 story house with 2 ASHU and 1 hand rail floor. Pt's bedroom and bathroom are on the 1st floor.    Bathroom setup: walk in shower with built in seat, grab bars, hand held shower head; elevated commode   Equipment owned: wheeled walker    Prior Level of Function: independent with ADLs , assist from wife with IADLs; functional mobility: no device in home; wheeled walker in community    Pain Level: Pt did not report pain this date; pt agreeable to therapy  Cognition: A&O: 3/4; 2-3 step command follow demonstrated   Memory: Forgetful   Sequencing:  Fair   Problem solving:  Fair   Judgement/safety:  Fair     Functional Assessment:  AM-PAC Daily Activity Raw Score: 17/24   Initial Eval Status  Date: 8/17/21 Treatment Status  Date: STGs = LTGs  Time frame: 10-14 days   Feeding Independent      Grooming CGA  Decreased standing tolerance/balance noted   Mod I/I   UB Dressing Min A  Mod I/I   LB Dressing Mod A   Sup-with use of AD as appropriate/needed   Bathing Mod A  Sup -with use of AD as appropriate/needed   Toileting Min A  Sup/I    Bed Mobility  Supine to sit: SBA (with increased time needed)   Independent   Functional Transfers CGA with wheeled walker  Sit<>stand from EOB  Sit<>stand from chair  Cues for hand placement and safe technique   Sup/I    Functional Mobility CGA with wheeled walker  Household distance in room and osei  Cues for safe wheeled walker management  Sup/I -with device as needed to maximize independence with ADLs and functional task completion   Balance Sitting:     Static:  Sup    Dynamic:SBA  Standing: CGA with wheeled walker  I for static/dynamic sitting balance to maximize independence with ADLs and functional task completion    I for standing balance to maximize independence with ADLs and functional task completion   Activity Tolerance Fair with light activity  Good with ADL activity. Pt will demonstrate good understanding of education provided on EC/WS techniques    Visual/  Perceptual Glasses: yes                Additional long-term goal: Pt will increase functional independence to PLOF to allow pt to live in least restrictive environment. Hand Dominance R   AROM (PROM) Strength   RUE  WFL WFL   LUE WFL WFL     Hearing: WFL   Sensation:  Pt reported numbness and tingling in B hands that comes and goes  Tone: WFL   Edema: none noted    Comments: Upon arrival patient lying in bed with wife present. At end of session, patient sitting in chair with call light and phone within reach, all lines and tubes intact, and alarm set and wife present. Overall patient demonstrated decreased independence and safety during completion of ADL/functional transfer/mobility tasks. Pt would benefit from continued skilled OT to increase safety and independence with completion of ADL/IADL tasks for functional independence and quality of life. Rehab Potential: Good for established goals     Patient / Family Goal: none stated    Patient and/or family were instructed on functional diagnosis, prognosis/goals and OT plan of care. Demonstrated fair understanding.      Eval Complexity: Low    Time In: 1118  Time Out: 1140    Min Units   OT Eval Low 97165  x  1   OT Eval Medium 53293      OT Eval High 02160      OT Re-Eval [id number]       Therapeutic Ex 14747       Therapeutic Activities 39044       ADL/Self Care 30479       Orthotic Management 56245       Manual [id number]     Neuro Re-Ed [id number]       Non-Billable Time 5        Evaluation Time additionally includes thorough review of current medical information, gathering information on past medical history/social history and prior level of function, interpretation of standardized testing/informal observation of tasks, assessment of data and development of plan of care and goals.             Clovis Chen, OTR/L, [id number]

## 2022-08-17 NOTE — H&P
History and Physical      CHIEF COMPLAINT: Diarrhea, melena, emesis    History of Present Illness: 18-year-old male patient of Dr. Mauri Martinez I am asked to admit and follow. History obtained from patient, his wife as well as extensive review electronic record. Patient presented to the ED with the above complaints x48 hours after being advised to report to the ED by Dr. Phil Pascual. He is also noticed he has been unsteady on his feet fatigued did fall approximately 2 weeks ago. Wife states he was scheduled for outpatient MRI on his brain and neck to be done yesterday at [facility] however he is admitted. Wife states MRI was to be done for comparison to previous MRIs. Patient with no known aneurysm in the past.  --Patient with history of prostate cancer 2007 for which he underwent radiation. --He has been seen in consultation by surgical service who noted patient is on daily aspirin and had EGD in 2019 showing gastritis. He is now scheduled for EGD today. Keep n.p.o. sips with liquids. Transfuse as needed. --In the ED blood pressure 97/62 temperature 97.7 SPO2 94 on room air. Hemoglobin the ED 15.8, today 13.2. V07 folic acid normal.  Ferritin 245 iron 76 iron saturation 38 TIBC low at 200. ESR 2.  --Ultrasound of gallbladder was unremarkable. CT of the abdomen with following results--    FINDINGS:   Lower Chest: Mild dependent atelectasis is seen in the lower lobes. The   heart is normal in size. No pleural or pericardial effusion. Calcified   pleural and diaphragmatic plaques indicate prior asbestos exposure. Organs:     Liver: Small cyst in the right lobe. Otherwise, unremarkable. Gallbladder: Unremarkable. Pancreas: Mild to moderately atrophied. Otherwise, unremarkable. Spleen:  Unremarkable. Adrenals: Unremarkable. Kidneys: Unremarkable. GI/Bowel: Liquid stool in the colon indicates a diarrheal illness.   Apparent   mural thickening of the gastric wall may be an artifact of under distension   or signify gastritis. Pelvis: The urinary bladder is unremarkable. Brachytherapy seeds are seen in   the prostate gland, which is not enlarged. Peritoneum/Retroperitoneum: Moderate atherosclerosis in the abdominal aorta. Aneurysmal dilation of the infrarenal aorta to approximately 3.5 x 3.1 cm no   lymphadenopathy. No free air or free fluid is seen. Bones/Soft Tissues: The visualized bones are intact without fracture or focal   lesion. Impression:       1. Liquid stool in the colon indicates a diarrheal illness. No bowel wall   thickening or obstruction noted. 2. Apparent mural thickening of the stomach may be an artifact of under   distension or signify gastritis. 3. Aneurysmal dilation of the infrarenal aorta to 3.5 cm. Per the   recommendations of the [employer] of [employer], imaging follow-up is   recommended every 2 years. 4. Calcified pleural and diaphragmatic plaques indicating asbestos exposure. CT of the head done in the ED with following results--    FINDINGS:   BRAIN/VENTRICLES: There is no acute intracranial hemorrhage, mass effect or   midline shift. No abnormal extra-axial fluid collection. The gray-white   differentiation is maintained without evidence of an acute infarct. There is   no evidence of hydrocephalus. ORBITS: The visualized portion of the orbits demonstrate no acute abnormality. SINUSES: The visualized paranasal sinuses and mastoid air cells demonstrate   no acute abnormality. SOFT TISSUES/SKULL:  No acute abnormality of the visualized skull or soft   tissues. Impression:       No acute intracranial abnormality. There is age-appropriate atrophy,   small-vessel ischemic disease, old cortical infarct in the left occipital   lobe, and lacunar CVAs in the left basal ganglia and right centrum semiovale.               Past Medical History:   Diagnosis Date    Abdominal aortic aneurysm (AAA) 3.0 cm to 5.5 cm in diameter in male [facility]) 06/14/2019    wife unaware    Acute respiratory failure with hypoxia ([address] 6/14/2019    Alzheimer's dementia without behavioral disturbance ([address] 9/14/2017    Aspiration pneumonia ([address] 6/14/2019    Balance problem 4/4/2022    Carpal tunnel syndrome of left wrist 2/19/2021    Cervical radiculopathy 2/19/2021    COVID-19 12/2020    CVA (cerebral vascular accident) ([address]     x 3    DDD (degenerative disc disease), cervical 6/21/2021    DDD (degenerative disc disease), lumbar 3/1/2021    Dementia ([address]     Depression     Heart murmur     Hematemesis     history of    History of stroke 9/14/2017    L-S radiculopathy 2/19/2021    Lactic acidosis 6/11/2019    Left arm numbness 6/24/2020    Lumbosacral spondylosis without myelopathy 3/1/2021    Neuropathy 9/14/2017    RAF (obstructive sleep apnea)     wears CPAP    Osteoarthritis     Peripheral polyneuropathy     Persistent postural-perceptual dizziness 9/24/2021    Pneumonia 6/11/2019    Prostate cancer ([address] 2007    SBO (small bowel obstruction) ([address] 6/11/2019    Sepsis ([address] 6/14/2019    Present on admission    Sleep apnea 10/22/2019    Snoring 10/22/2019    Spinal stenosis of lumbar region 3/1/2021    Stage 3 chronic kidney disease ([address] 6/14/2019         Past Surgical History:   Procedure Laterality Date    CATARACT REMOVAL      HERNIA REPAIR      PAIN MANAGEMENT PROCEDURE Bilateral 3/8/2021    # 1 LUMBAR TRANSFORAMINAL EPIDURAL STEROID INJECTION bilateral L4 UNDER FLUOROSCOPIC GUIDANCE performed by Nic Medina MD at [address] [facility]      beacons/radiation    UPPER GASTROINTESTINAL ENDOSCOPY N/A 6/15/2019    EGD ESOPHAGOGASTRODUODENOSCOPY WITH BIOPSY performed by Ailyn Mazariegos MD at [facility] OR       Medications Prior to Admission:    Medications Prior to Admission: loperamide (IMODIUM) 2 MG capsule, Take 2 mg by mouth 4 times daily as needed for Diarrhea  rivastigmine (EXELON) 1.5 MG capsule, Take 1 capsule by mouth 2 times daily  memantine (NAMENDA) 10 MG tablet, TAKE 1 TABLET TWICE A DAY  Multiple Vitamins-Minerals (THERAPEUTIC MULTIVITAMIN-MINERALS) tablet, Take 1 tablet by mouth every morning Last dose 3-1-21  pantoprazole (PROTONIX) 40 MG tablet, Take 40 mg by mouth every morning   atorvastatin (LIPITOR) 40 MG tablet, Take 80 mg by mouth every morning  amLODIPine (NORVASC) 10 MG tablet, Take 10 mg by mouth every morning   aspirin 81 MG chewable tablet, Take 1 tablet by mouth daily (Patient taking differently: Take 81 mg by mouth every morning)  diazepam (VALIUM) 5 MG tablet, Take 5 mg by mouth every morning. Allergies:    Oxycodone-acetaminophen and Vicodin [hydrocodone-acetaminophen]    Social History:    reports that he quit smoking about 35 years ago. His smoking use included cigarettes. He started smoking about 63 years ago. He has a 34.00 pack-year smoking history. He has never used smokeless tobacco. He reports that he does not currently use alcohol. He reports that he does not use drugs. Family History:   family history includes Crohn's Disease in his sister; Heart Disease in his father. REVIEW OF SYSTEMS:  As above in the HPI, otherwise negative    PHYSICAL EXAM:    VS: BP (!) 106/56   Pulse 84   Temp 98.5 °F (36.9 °C) (Oral)   Resp 16   Ht 5' 11\" (1.803 m)   Wt 180 lb 1.6 oz (81.7 kg)   SpO2 90%   BMI 25.12 kg/m²     General appearance: Alert, Awake, oriented x3, no distress  Skin: Warm and dry ; no rashes  Head: Normocephalic. No masses, lesions or tenderness noted  Eyes: Conjunctivae pink, sclera white. PERRL,EOM-I  Ears: External ears normal  Nose/Sinuses: Nares normal. Septum midline. Mucosa normal. No drainage  Oropharynx: Oropharynx clear with no exudate seen  Neck: Supple. No jugular venous distension, lymphadenopathy or thyromegaly Trachea midline  Lungs: Clear to auscultation bilaterally. No rhonchi, crackles or wheezes  Heart: S1 S2  Regular rate and rhythm.  No rub, gallop, murmur  Abdomen: Soft, minimally tender right lower quadrant. BS normal. No masses, organomegaly; no rebound or guarding  Extremities: 1+ edema, Peripheral pulses palpable  Musculoskeletal: Muscular strength appears intact. Neuro:  No focal motor defects ; II-XII grossly intact .  BRIGHT equally  Breast: deferred  Rectal: deferred  Genitalia:  deferred    LABS:  CBC:   Lab Results   Component Value Date/Time    WBC 8.2 08/16/2022 02:32 PM    RBC 4.86 08/16/2022 02:32 PM    HGB 13.2 08/17/2022 02:45 AM    HCT 39.3 08/17/2022 02:45 AM    MCV 97.7 08/16/2022 02:32 PM    MCH 32.5 08/16/2022 02:32 PM    MCHC 33.3 08/16/2022 02:32 PM    RDW 12.6 08/16/2022 02:32 PM     08/16/2022 02:32 PM    MPV 10.0 08/16/2022 02:32 PM     CBC with Differential:    Lab Results   Component Value Date/Time    WBC 8.2 08/16/2022 02:32 PM    RBC 4.86 08/16/2022 02:32 PM    HGB 13.2 08/17/2022 02:45 AM    HCT 39.3 08/17/2022 02:45 AM     08/16/2022 02:32 PM    MCV 97.7 08/16/2022 02:32 PM    MCH 32.5 08/16/2022 02:32 PM    MCHC 33.3 08/16/2022 02:32 PM    RDW 12.6 08/16/2022 02:32 PM    LYMPHOPCT 31.6 08/16/2022 02:32 PM    MONOPCT 15.4 08/16/2022 02:32 PM    BASOPCT 0.1 08/16/2022 02:32 PM    MONOSABS 1.26 08/16/2022 02:32 PM    LYMPHSABS 2.59 08/16/2022 02:32 PM    EOSABS 0.24 08/16/2022 02:32 PM    BASOSABS 0.01 08/16/2022 02:32 PM     Hemoglobin/Hematocrit:    Lab Results   Component Value Date/Time    HGB 13.2 08/17/2022 02:45 AM    HCT 39.3 08/17/2022 02:45 AM     CMP:    Lab Results   Component Value Date/Time     08/17/2022 02:45 AM    K 4.2 08/17/2022 02:45 AM    K 4.5 08/16/2022 02:32 PM     08/17/2022 02:45 AM    CO2 22 08/17/2022 02:45 AM    BUN 29 08/17/2022 02:45 AM    CREATININE 1.3 08/17/2022 02:45 AM    GFRAA >60 08/17/2022 02:45 AM    LABGLOM 53 08/17/2022 02:45 AM    GLUCOSE 98 08/17/2022 02:45 AM    PROT 5.2 08/17/2022 02:45 AM    LABALBU 3.1 08/17/2022 02:45 AM    CALCIUM 8.3 08/17/2022 02:45 AM BILITOT 1.0 08/17/2022 02:45 AM    ALKPHOS 84 08/17/2022 02:45 AM    AST 12 08/17/2022 02:45 AM    ALT 8 08/17/2022 02:45 AM     BMP:    Lab Results   Component Value Date/Time     08/17/2022 02:45 AM    K 4.2 08/17/2022 02:45 AM    K 4.5 08/16/2022 02:32 PM     08/17/2022 02:45 AM    CO2 22 08/17/2022 02:45 AM    BUN 29 08/17/2022 02:45 AM    LABALBU 3.1 08/17/2022 02:45 AM    CREATININE 1.3 08/17/2022 02:45 AM    CALCIUM 8.3 08/17/2022 02:45 AM    GFRAA >60 08/17/2022 02:45 AM    LABGLOM 53 08/17/2022 02:45 AM    GLUCOSE 98 08/17/2022 02:45 AM     Hepatic Function Panel:    Lab Results   Component Value Date/Time    ALKPHOS 84 08/17/2022 02:45 AM    ALT 8 08/17/2022 02:45 AM    AST 12 08/17/2022 02:45 AM    PROT 5.2 08/17/2022 02:45 AM    BILITOT 1.0 08/17/2022 02:45 AM    BILIDIR 0.3 08/17/2022 02:45 AM    IBILI 0.7 08/17/2022 02:45 AM    LABALBU 3.1 08/17/2022 02:45 AM     Ionized Calcium:  No results found for: IONCA  Magnesium:    Lab Results   Component Value Date/Time    MG 1.8 08/17/2022 02:45 AM     Phosphorus:    Lab Results   Component Value Date/Time    PHOS 3.0 08/17/2022 02:45 AM     LDH:  No results found for: LDH  Uric Acid:    Lab Results   Component Value Date/Time    LABURIC 7.3 08/17/2022 02:45 AM     PT/INR:  No results found for: PROTIME, INR  Warfarin PT/INR:  No components found for: PTPATWAR, PTINRWAR  PTT:  No results found for: APTT, PTT[APTT}  Troponin:    Lab Results   Component Value Date/Time    TROPONINI <0.01 06/11/2019 02:46 PM     Last 3 Troponin:    Lab Results   Component Value Date/Time    TROPONINI <0.01 06/11/2019 02:46 PM     U/A:    Lab Results   Component Value Date/Time    COLORU Yellow 08/16/2022 03:41 PM    PROTEINU Negative 08/16/2022 03:41 PM    PHUR 5.5 08/16/2022 03:41 PM    WBCUA 0-1 08/16/2022 03:41 PM    RBCUA NONE 08/16/2022 03:41 PM    BACTERIA NONE SEEN 08/16/2022 03:41 PM    CLARITYU Clear 08/16/2022 03:41 PM    SPECGRAV 1.020 08/16/2022 03:41 PM    LEUKOCYTESUR Negative 08/16/2022 03:41 PM    UROBILINOGEN 0.2 08/16/2022 03:41 PM    BILIRUBINUR Negative 08/16/2022 03:41 PM    BLOODU Negative 08/16/2022 03:41 PM    GLUCOSEU Negative 08/16/2022 03:41 PM     HgBA1c:    Lab Results   Component Value Date/Time    LABA1C 5.7 08/16/2022 08:25 PM     FLP:    Lab Results   Component Value Date/Time    TRIG 98 08/17/2022 02:45 AM    HDL 25 08/17/2022 02:45 AM    LDLCALC 37 08/17/2022 02:45 AM    LABVLDL 20 08/17/2022 02:45 AM     TSH:    Lab Results   Component Value Date/Time    TSH 2.550 08/17/2022 02:45 AM     VITAMIN B12: No components found for: B12  FOLATE:    Lab Results   Component Value Date/Time    FOLATE 17.3 08/16/2022 08:25 PM     IRON:    Lab Results   Component Value Date/Time    IRON 76 08/17/2022 02:45 AM     Iron Saturation:  No components found for: PERCENTFE  TIBC:    Lab Results   Component Value Date/Time    TIBC 200 08/17/2022 02:45 AM     FERRITIN:    Lab Results   Component Value Date/Time    FERRITIN 245 08/17/2022 02:45 AM     PSA: No results found for: PSA    RADIOLOGY:  CT ABDOMEN PELVIS W IV CONTRAST Additional Contrast? None   Final Result   1. Liquid stool in the colon indicates a diarrheal illness. No bowel wall   thickening or obstruction noted. 2. Apparent mural thickening of the stomach may be an artifact of under   distension or signify gastritis. 3. Aneurysmal dilation of the infrarenal aorta to 3.5 cm. Per the   recommendations of the [employer] of [employer], imaging follow-up is   recommended every 2 years. 4. Calcified pleural and diaphragmatic plaques indicating asbestos exposure. CT Head WO Contrast   Final Result   Addendum (preliminary) 1 of 1   ADDENDUM:   Nonspecific lytic lesions are identified on C6 and C7. Final   No acute intracranial abnormality.   There is age-appropriate atrophy,   small-vessel ischemic disease, old cortical infarct in the left occipital   lobe, and lacunar CVAs in the left basal ganglia and right centrum semiovale. CT cervical spine. There is no acute fracture or dislocation in the cervical spine. The   prevertebral soft tissues are normal.  Diffuse degenerative changes are   identified from C2-T1 with osteophytes and multilevel disc bulges. There is   scarring and apical pleural thickening partially identified. Impression      No acute fractures. Diffuse degenerative changes from C2-T1. CT Cervical Spine WO Contrast   Final Result   Addendum (preliminary) 1 of 1   ADDENDUM:   Nonspecific lytic lesions are identified on C6 and C7. Final   No acute intracranial abnormality. There is age-appropriate atrophy,   small-vessel ischemic disease, old cortical infarct in the left occipital   lobe, and lacunar CVAs in the left basal ganglia and right centrum semiovale. CT cervical spine. There is no acute fracture or dislocation in the cervical spine. The   prevertebral soft tissues are normal.  Diffuse degenerative changes are   identified from C2-T1 with osteophytes and multilevel disc bulges. There is   scarring and apical pleural thickening partially identified. Impression      No acute fractures. Diffuse degenerative changes from C2-T1. US GALLBLADDER RUQ   Final Result   Unremarkable right upper quadrant ultrasound.              ASSESSMENT:      Active Hospital Problems    Diagnosis     GI bleed [K92.2]      Priority: Medium    RAF (obstructive sleep apnea) [G47.33]      Priority: Medium    Prostate cancer ([address] [C61]      Priority: Medium    Stage 3 chronic kidney disease ([address] [N18.30]     Abdominal aortic aneurysm (AAA) 3.0 cm to 5.5 cm in diameter in [facility]) [I71.4]     Alzheimer's dementia without behavioral disturbance ([address] [G30.9, F02.80]     History of stroke [Z86.73]        PLAN:  Medications discussed with patient  GI prophylaxis  DVT prophylaxis--sequential compression device  Consultants notes reviewed  Imodium 4 times daily as needed for diarrhea  Amlodipine 10 mg daily  Atorvastatin 40 mg daily  Valium 5 mg each morning  Namenda 10 mg twice daily  Exelon 1.5 mg capsule twice daily  Aspirin on hold  Therapeutic vitamins  EGD today  Transfuse hemoglobin less than 7  H&H every 8 hours  PSA in a.m.   MRI of brain without contrast for comparison  MRI C-spine without contrast for comparison  Vascular surgery consult regarding aneurysm  Medically stable for EGD today          Please note that over 50 minutes was spent in evaluating the patient, review of records and results, discussion with staff/family, etc.    Mabel Almodovar DO    10:18 AM  8/17/2022    Voice recognition software use for dictation

## 2022-08-17 NOTE — ANESTHESIA POSTPROCEDURE EVALUATION
Department of Anesthesiology  Postprocedure Note    Patient: Emanule Read  MRN: [id number]  [place]: 1940  Date of evaluation: 8/17/2022      Procedure Summary     Date: 08/17/22 Room / Location: [facility] / [facility]    Anesthesia Start: 7621 Anesthesia Stop: 1703    Procedure: EGD BIOPSY Diagnosis:       Pain      (Pain [R52])    Surgeons: Melyssa Pride MD Responsible Provider: Kaleb Moran MD    Anesthesia Type: MAC ASA Status: 3          Anesthesia Type: MAC    Mirian Phase I:      Mirian Phase II:        Anesthesia Post Evaluation    Patient location during evaluation: bedside  Patient participation: complete - patient participated  Level of consciousness: awake and alert  Airway patency: patent  Nausea & Vomiting: no nausea  Complications: no  Cardiovascular status: hemodynamically stable  Respiratory status: acceptable and spontaneous ventilation  Hydration status: stable

## 2022-08-17 NOTE — ANESTHESIA PRE PROCEDURE
Department of Anesthesiology  Preprocedure Note       Name:  Inocencio Bejarano   Age:  79 y.o.  :  1940                                          MRN:  [id number]         Date:  2022      Surgeon: Lonna Frankel):  Malika Downey MD    Procedure: Procedure(s):  EGD ESOPHAGOGASTRODUODENOSCOPY    Medications prior to admission:   Prior to Admission medications    Medication Sig Start Date End Date Taking? Authorizing Provider   loperamide (IMODIUM) 2 MG capsule Take 2 mg by mouth 4 times daily as needed for Diarrhea   Yes Historical Provider, MD   rivastigmine (EXELON) 1.5 MG capsule Take 1 capsule by mouth 2 times daily 3/30/22   Elidia Dakin, APRN - CNP   memantine (NAMENDA) 10 MG tablet TAKE 1 TABLET TWICE A DAY 22   Elidia Dakin, APRN - CNP   Multiple Vitamins-Minerals (THERAPEUTIC MULTIVITAMIN-MINERALS) tablet Take 1 tablet by mouth every morning Last dose 3-1-21    Historical Provider, MD   pantoprazole (PROTONIX) 40 MG tablet Take 40 mg by mouth every morning  3/12/18   Historical Provider, MD   atorvastatin (LIPITOR) 40 MG tablet Take 80 mg by mouth every morning    Historical Provider, MD   amLODIPine (NORVASC) 10 MG tablet Take 10 mg by mouth every morning     Historical Provider, MD   aspirin 81 MG chewable tablet Take 1 tablet by mouth daily  Patient taking differently: Take 81 mg by mouth every morning 17   Elidia Dakin, APRN - CNP   diazepam (VALIUM) 5 MG tablet Take 5 mg by mouth every morning.      Historical Provider, MD       Current medications:    Current Facility-Administered Medications   Medication Dose Route Frequency Provider Last Rate Last Admin    pantoprazole (PROTONIX) 40 mg in sodium chloride (PF) 10 mL injection  40 mg IntraVENous BID Julian Carmichael MD   40 mg at 22 0920    amLODIPine (NORVASC) tablet 10 mg  10 mg Oral QAM Ryley A Mihok, DO        atorvastatin (LIPITOR) tablet 40 mg  40 mg Oral QAM Ryley A Mihok, DO   40 mg at 22 0920    calcium-vitamin D (OSCAL-500) 500-200 MG-UNIT per tablet 1 tablet  1 tablet Oral Daily Ryley A Mihok, DO        diazePAM (VALIUM) tablet 5 mg  5 mg Oral QAM Ryley A Mihok, DO   5 mg at 08/17/22 0920    memantine (NAMENDA) tablet 10 mg  10 mg Oral BID Jocelyn Sand Mihok, DO   10 mg at 08/17/22 0920    therapeutic multivitamin-minerals 1 tablet  1 tablet Oral QAM Ryley A Mihok, DO        rivastigmine (EXELON) capsule 1.5 mg  1.5 mg Oral BID Jocelyn Sand Mihok, DO   1.5 mg at 08/17/22 4117    vitamin E capsule 400 Units  400 Units Oral QAM Ryley A Mihok, DO        vitamin B-12 (CYANOCOBALAMIN) tablet 500 mcg  500 mcg Oral QAM Ryley A Mihok, DO        0.9 % sodium chloride infusion   IntraVENous Continuous Jocelyn Sand Mihok,  mL/hr at 08/16/22 2012 New Bag at 08/16/22 2012    potassium chloride (KLOR-CON M) extended release tablet 40 mEq  40 mEq Oral PRN Jocelyn Sand Mihok, DO        Or    potassium bicarb-citric acid (EFFER-K) effervescent tablet 40 mEq  40 mEq Oral PRN Jocelyn Sand Mihok, DO        Or    potassium chloride 10 mEq/100 mL IVPB (Peripheral Line)  10 mEq IntraVENous PRN Jocelyn Sand Mihok, DO        acetaminophen (TYLENOL) tablet 650 mg  650 mg Oral Q4H PRN Jocelyn Sand Mihok, DO           Allergies:     Allergies   Allergen Reactions    Oxycodone-Acetaminophen Nausea And Vomiting     Other reaction(s): GI Upset    Vicodin [Hydrocodone-Acetaminophen] Nausea And Vomiting       Problem List:    Patient Active Problem List   Diagnosis Code    Alzheimer's dementia without behavioral disturbance (HCC) G30.9, F02.80    History of stroke Z86.73    Stage 3 chronic kidney disease (HCC) N18.30    Abdominal aortic aneurysm (AAA) 3.0 cm to 5.5 cm in diameter in male ([facility] [address] I71.4    Sleep apnea G47.30    L-S radiculopathy M54.17    Cervical radiculopathy M54.12    Carpal tunnel syndrome of left wrist G56.02    DDD (degenerative disc disease), lumbar M51.36    Lumbosacral spondylosis without myelopathy M47.817    Spinal stenosis of lumbar region M48.061    DDD (degenerative disc disease), cervical M50.30    Persistent postural-perceptual dizziness R42    Balance problem R26.89    Prostate cancer ([address] C61    Dementia (HCC) F03.90    RAF (obstructive sleep apnea) G47.33    GI bleed K92.2    Carotid stenosis, right I65.21       Past Medical History:        Diagnosis Date    Abdominal aortic aneurysm (AAA) 3.0 cm to 5.5 cm in diameter in male [facility]) 06/14/2019    wife unaware    Acute respiratory failure with hypoxia ([address] 6/14/2019    Alzheimer's dementia without behavioral disturbance ([address] 9/14/2017    Aspiration pneumonia ([address] 6/14/2019    Balance problem 4/4/2022    Carpal tunnel syndrome of left wrist 2/19/2021    Cervical radiculopathy 2/19/2021    COVID-19 12/2020    CVA (cerebral vascular accident) ([address]     x 3    DDD (degenerative disc disease), cervical 6/21/2021    DDD (degenerative disc disease), lumbar 3/1/2021    Dementia ([address]     Depression     Heart murmur     Hematemesis     history of    History of stroke 9/14/2017    L-S radiculopathy 2/19/2021    Lactic acidosis 6/11/2019    Left arm numbness 6/24/2020    Lumbosacral spondylosis without myelopathy 3/1/2021    Neuropathy 9/14/2017    RAF (obstructive sleep apnea)     wears CPAP    Osteoarthritis     Peripheral polyneuropathy     Persistent postural-perceptual dizziness 9/24/2021    Pneumonia 6/11/2019    Prostate cancer ([address] 2007    SBO (small bowel obstruction) ([address] 6/11/2019    Sepsis ([address] 6/14/2019    Present on admission    Sleep apnea 10/22/2019    Snoring 10/22/2019    Spinal stenosis of lumbar region 3/1/2021    Stage 3 chronic kidney disease ([address] 6/14/2019       Past Surgical History:        Procedure Laterality Date    CATARACT REMOVAL      HERNIA REPAIR      PAIN MANAGEMENT PROCEDURE Bilateral 3/8/2021    # 1 LUMBAR TRANSFORAMINAL EPIDURAL STEROID INJECTION bilateral L4 UNDER FLUOROSCOPIC GUIDANCE performed by Rosetta Hubbard MD at [address].      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                                Counseling given: Not Answered      Vital Signs (Current):   Vitals:    22 1922 22 0445 22 0748 22 1459   BP: (!) 108/58  (!) 106/56 105/61   Pulse: 88  84 80   Resp:    Temp: 98.3 °F (36.8 °C)  98.5 °F (36.9 °C) 98.2 °F (36.8 °C)   TempSrc: Oral  Oral Infrared   SpO2: 94%  90% 93%   Weight:  180 lb 1.6 oz (81.7 kg)     Height:                                                  BP Readings from Last 3 Encounters:   22 105/61   22 114/68   01/10/22 122/70       NPO Status: Time of last liquid consumption: 0700                                                 Date of last liquid consumption: 22                        Date of last solid food consumption: 22    BMI:   Wt Readings from Last 3 Encounters:   22 180 lb 1.6 oz (81.7 kg)   22 180 lb (81.6 kg)   22 178 lb (80.7 kg)     Body mass index is 25.12 kg/m².     CBC:   Lab Results   Component Value Date/Time    WBC 8.2 2022 02:32 PM    RBC 4.86 2022 02:32 PM    HGB 13.0 2022 12:35 PM    HCT 38.8 2022 12:35 PM    MCV 97.7 2022 02:32 PM    RDW 12.6 2022 02:32 PM     2022 02:32 PM       CMP:   Lab Results   Component Value Date/Time     2022 02:45 AM    K 4.2 2022 02:45 AM    K 4.5 2022 02:32 PM

## 2022-08-17 NOTE — CONSULTS
3 chronic kidney disease ([facility] [address] 6/14/2019       Past Surgical History:   Procedure Laterality Date    CATARACT REMOVAL      HERNIA REPAIR      PAIN MANAGEMENT PROCEDURE Bilateral 3/8/2021    # 1 LUMBAR TRANSFORAMINAL EPIDURAL STEROID INJECTION bilateral L4 UNDER FLUOROSCOPIC GUIDANCE performed by Trisha Gonzalez MD at [facility] [address] [facility]      beacons/radiation    UPPER GASTROINTESTINAL ENDOSCOPY N/A 6/15/2019    EGD ESOPHAGOGASTRODUODENOSCOPY WITH BIOPSY performed by Jade Arzate MD at [facility] OR       Medications Prior to Admission:    Prior to Admission medications    Medication Sig Start Date End Date Taking? Authorizing Provider   loperamide (IMODIUM) 2 MG capsule Take 2 mg by mouth 4 times daily as needed for Diarrhea   Yes Historical Provider, MD   rivastigmine (EXELON) 1.5 MG capsule Take 1 capsule by mouth 2 times daily 3/30/22   LIZETH Valadez CNP   memantine (NAMENDA) 10 MG tablet TAKE 1 TABLET TWICE A DAY 2/24/22   LIZETH Valadez CNP   Multiple Vitamins-Minerals (THERAPEUTIC MULTIVITAMIN-MINERALS) tablet Take 1 tablet by mouth every morning Last dose 3-1-21    Historical Provider, MD   pantoprazole (PROTONIX) 40 MG tablet Take 40 mg by mouth every morning  3/12/18   Historical Provider, MD   atorvastatin (LIPITOR) 40 MG tablet Take 80 mg by mouth every morning    Historical Provider, MD   amLODIPine (NORVASC) 10 MG tablet Take 10 mg by mouth every morning     Historical Provider, MD   aspirin 81 MG chewable tablet Take 1 tablet by mouth daily  Patient taking differently: Take 81 mg by mouth every morning 5/11/17   LIZETH Valadez CNP   diazepam (VALIUM) 5 MG tablet Take 5 mg by mouth every morning.      Historical Provider, MD       Allergies   Allergen Reactions    Oxycodone-Acetaminophen Nausea And Vomiting     Other reaction(s): GI Upset    Vicodin [Hydrocodone-Acetaminophen] Nausea And Vomiting       Family History   Problem Relation Age of Onset    Heart Disease Father     Crohn's Disease Sister        Social History     Tobacco Use    Smoking status: Former     Packs/day: 1.00     Years: 34.00     Pack years: 34.00     Types: Cigarettes     Start date: 1959     Quit date: 1987     Years since quittin.2    Smokeless tobacco: Never   Vaping Use    Vaping Use: Never used   Substance Use Topics    Alcohol use: Not Currently     Comment: 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 No rashes or lesions. Head/face:  NCAT  Eyes:  No gross abnormalities. Lungs:  No increased work of breathing on room air  Heart:  RR and normotensive  Abdomen:  Soft, nontender, nondistended  Extremities: Extremities warm to touch, pink, with no edema. LABS:    CBC  Recent Labs     22  1432 22  0245   WBC 8.2  --    HGB 15.8 13.2   HCT 47.5 39.3     --      BMP  Recent Labs     22  0245      K 4.2      CO2 22   BUN 29*   CREATININE 1.3*   CALCIUM 8.3*     Liver Function  Recent Labs     22  1432 22  0245   LIPASE 9*  --    BILITOT 1.3* 1.0   BILIDIR 0.2 0.3   AST 13 12   ALT 11 8   ALKPHOS 105 84   PROT 6.6 5.2*   LABALBU 4.1 3.1*     No results for input(s): LACTATE in the last 72 hours. No results for input(s): INR, PTT in the last 72 hours. Invalid input(s): PT    RADIOLOGY    CT Head WO Contrast    Addendum Date: 2022    ADDENDUM: Nonspecific lytic lesions are identified on C6 and C7.      Result Date: 2022  EXAMINATION: CT OF THE HEAD WITHOUT CONTRAST; CT OF THE CERVICAL SPINE WITHOUT CONTRAST 8/16/2022 3:48 pm TECHNIQUE: CT of the head was performed without the administration of intravenous contrast. Automated exposure control, iterative reconstruction, and/or weight based adjustment of the mA/kV was utilized to reduce the radiation dose to as low as reasonably achievable.; CT of the cervical spine was performed without the administration of intravenous contrast. Multiplanar reformatted images are provided for review. Automated exposure control, iterative reconstruction, and/or weight based adjustment of the mA/kV was utilized to reduce the radiation dose to as low as reasonably achievable. COMPARISON: September 5, 2021 HISTORY: ORDERING SYSTEM PROVIDED HISTORY: falls and unsteady TECHNOLOGIST PROVIDED HISTORY: Reason for exam:->falls and unsteady Has a \"code stroke\" or \"stroke alert\" been called? ->No Decision Support Exception - unselect if not a suspected or confirmed emergency medical condition->Emergency Medical Condition (MA) FINDINGS: BRAIN/VENTRICLES: There is no acute intracranial hemorrhage, mass effect or midline shift. No abnormal extra-axial fluid collection. The gray-white differentiation is maintained without evidence of an acute infarct. There is no evidence of hydrocephalus. ORBITS: The visualized portion of the orbits demonstrate no acute abnormality. SINUSES: The visualized paranasal sinuses and mastoid air cells demonstrate no acute abnormality. SOFT TISSUES/SKULL:  No acute abnormality of the visualized skull or soft tissues. No acute intracranial abnormality. There is age-appropriate atrophy, small-vessel ischemic disease, old cortical infarct in the left occipital lobe, and lacunar CVAs in the left basal ganglia and right centrum semiovale. CT cervical spine. There is no acute fracture or dislocation in the cervical spine.   The prevertebral soft tissues are normal.  Diffuse degenerative changes are identified from C2-T1 with osteophytes and multilevel disc bulges. There is scarring and apical pleural thickening partially identified. Impression No acute fractures. Diffuse degenerative changes from C2-T1. CT Cervical Spine WO Contrast    Addendum Date: 8/16/2022    ADDENDUM: Nonspecific lytic lesions are identified on C6 and C7. Result Date: 8/16/2022  EXAMINATION: CT OF THE HEAD WITHOUT CONTRAST; CT OF THE CERVICAL SPINE WITHOUT CONTRAST 8/16/2022 3:48 pm TECHNIQUE: CT of the head was performed without the administration of intravenous contrast. Automated exposure control, iterative reconstruction, and/or weight based adjustment of the mA/kV was utilized to reduce the radiation dose to as low as reasonably achievable.; CT of the cervical spine was performed without the administration of intravenous contrast. Multiplanar reformatted images are provided for review. Automated exposure control, iterative reconstruction, and/or weight based adjustment of the mA/kV was utilized to reduce the radiation dose to as low as reasonably achievable. COMPARISON: September 5, 2021 HISTORY: ORDERING SYSTEM PROVIDED HISTORY: falls and unsteady TECHNOLOGIST PROVIDED HISTORY: Reason for exam:->falls and unsteady Has a \"code stroke\" or \"stroke alert\" been called? ->No Decision Support Exception - unselect if not a suspected or confirmed emergency medical condition->Emergency Medical Condition (MA) FINDINGS: BRAIN/VENTRICLES: There is no acute intracranial hemorrhage, mass effect or midline shift. No abnormal extra-axial fluid collection. The gray-white differentiation is maintained without evidence of an acute infarct. There is no evidence of hydrocephalus. ORBITS: The visualized portion of the orbits demonstrate no acute abnormality. SINUSES: The visualized paranasal sinuses and mastoid air cells demonstrate no acute abnormality. SOFT TISSUES/SKULL:  No acute abnormality of the visualized skull or soft tissues.      No acute intracranial abnormality. There is age-appropriate atrophy, small-vessel ischemic disease, old cortical infarct in the left occipital lobe, and lacunar CVAs in the left basal ganglia and right centrum semiovale. CT cervical spine. There is no acute fracture or dislocation in the cervical spine. The prevertebral soft tissues are normal.  Diffuse degenerative changes are identified from C2-T1 with osteophytes and multilevel disc bulges. There is scarring and apical pleural thickening partially identified. Impression No acute fractures. Diffuse degenerative changes from C2-T1. CT ABDOMEN PELVIS W IV CONTRAST Additional Contrast? None    Result Date: 8/16/2022  EXAMINATION: CT OF THE ABDOMEN AND PELVIS WITH CONTRAST 8/16/2022 3:48 pm TECHNIQUE: CT of the abdomen and pelvis was performed with the administration of intravenous contrast. Multiplanar reformatted images are provided for review. Automated exposure control, iterative reconstruction, and/or weight based adjustment of the mA/kV was utilized to reduce the radiation dose to as low as reasonably achievable. COMPARISON: CT abdomen and pelvis, 09/26/2021. CONTRAST: Insert central 5 cc HISTORY: ORDERING SYSTEM PROVIDED HISTORY: derek khan TECHNOLOGIST PROVIDED HISTORY: Additional Contrast?->None Reason for exam:->ruq abd melena Decision Support Exception - unselect if not a suspected or confirmed emergency medical condition->Emergency Medical Condition (MA) FINDINGS: Lower Chest: Mild dependent atelectasis is seen in the lower lobes. The heart is normal in size. No pleural or pericardial effusion. Calcified pleural and diaphragmatic plaques indicate prior asbestos exposure. Organs: Liver: Small cyst in the right lobe. Otherwise, unremarkable. Gallbladder: Unremarkable. Pancreas: Mild to moderately atrophied. Otherwise, unremarkable. Spleen:  Unremarkable. Adrenals: Unremarkable. Kidneys: Unremarkable.  GI/Bowel: Liquid stool in the colon indicates a diarrheal illness. Apparent mural thickening of the gastric wall may be an artifact of under distension or signify gastritis. Pelvis: The urinary bladder is unremarkable. Brachytherapy seeds are seen in the prostate gland, which is not enlarged. Peritoneum/Retroperitoneum: Moderate atherosclerosis in the abdominal aorta. Aneurysmal dilation of the infrarenal aorta to approximately 3.5 x 3.1 cm no lymphadenopathy. No free air or free fluid is seen. Bones/Soft Tissues: The visualized bones are intact without fracture or focal lesion. 1.  Liquid stool in the colon indicates a diarrheal illness. No bowel wall thickening or obstruction noted. 2. Apparent mural thickening of the stomach may be an artifact of under distension or signify gastritis. 3. Aneurysmal dilation of the infrarenal aorta to 3.5 cm. Per the recommendations of the [employer] of Radiology, imaging follow-up is recommended every 2 years. 4. Calcified pleural and diaphragmatic plaques indicating asbestos exposure. US GALLBLADDER RUQ    Result Date: 8/16/2022  EXAMINATION: RIGHT UPPER QUADRANT ULTRASOUND 8/16/2022 3:22 pm COMPARISON: None. HISTORY: ORDERING SYSTEM PROVIDED HISTORY: ruq pain to palp TECHNOLOGIST PROVIDED HISTORY: Reason for exam:->ruq pain to palp What reading provider will be dictating this exam?->CRC FINDINGS: LIVER:  The liver demonstrates normal echogenicity without evidence of intrahepatic biliary ductal dilatation. BILIARY SYSTEM:  Gallbladder is unremarkable without evidence of pericholecystic fluid, wall thickening or stones. Negative sonographic Yen's sign. Common bile duct is within normal limits measuring 3 mm. RIGHT KIDNEY: The right kidney is grossly unremarkable without evidence of hydronephrosis. PANCREAS: Not visualized. OTHER: No evidence of right upper quadrant ascites. Unremarkable right upper quadrant ultrasound.        ASSESSMENT:  79 y.o. male with melena, likely upper gastrointestinal hemorrhage    PLAN:  - Keep pt NPO, sips with meds  - Ensure adequate resuscitation with IVFs and blood products PRN  - Plan for EGD today 8/17  - Okay for diet after procedure  - PPI BID  - Monitor Hgb, transfuse as needed  - Discussed with Dr. Concepcion Lambert    Electronically signed by Peggy Rowley MD on 8/17/22 at 7:01 AM EDT    The patient was seen and examined in the chart was reviewed. I agree with the assessment and plan.

## 2022-08-17 NOTE — CONSULTS
Chief Complaint: Patient seen for evaluation of abdominal aortic aneurysm      HPI: This patient was hospitalized, further evaluation generalized weakness, asocial with history of diarrhea, emesis and melena, also has history of carcinoma prostate underwent radiation therapy, work-up including CT scan abdomen pelvis revealed a 3 x 3.4 cm abdominal aortic  noted on the CT scan done last year also and vascular service is consulted for further evaluation    When I came to see the patient outpatient wife Willow García was also in the room, patient was resting comfortably, waiting for endoscopic studies that are scheduled    Patient with plan denies any abdominal pain or back pain    Patient did not know that he had a aneurysm in the abdomen    Patient in the past had a couple of strokes, according to him, underwent work-up and was recommended medical therapy      Patient denies any new focal lateralizing neurological symptoms like loss of speech, vision or loss of function of extremity for the last few years    Patient can walk 1 block slowly at a time and denies any symptoms of rest pain    Allergies   Allergen Reactions    Oxycodone-Acetaminophen Nausea And Vomiting     Other reaction(s): GI Upset    Vicodin [Hydrocodone-Acetaminophen] Nausea And Vomiting       Current Facility-Administered Medications   Medication Dose Route Frequency Provider Last Rate Last Admin    pantoprazole (PROTONIX) 40 mg in sodium chloride (PF) 10 mL injection  40 mg IntraVENous BID Jerrol Hatchet, MD   40 mg at 08/17/22 0920    amLODIPine (NORVASC) tablet 10 mg  10 mg Oral QAM Ryley Abdik, DO        atorvastatin (LIPITOR) tablet 40 mg  40 mg Oral QAM Ryley Araizahok, DO   40 mg at 08/17/22 0920    calcium-vitamin D (OSCAL-500) 500-200 MG-UNIT per tablet 1 tablet  1 tablet Oral Daily Ryley A Mihok, DO        diazePAM (VALIUM) tablet 5 mg  5 mg Oral QAM Ryley A Mihok, DO   5 mg at 08/17/22 0920    memantine (NAMENDA) tablet 10 mg  10 mg Oral BID Jacik Honer Mihok, DO   10 mg at 08/17/22 0920    therapeutic multivitamin-minerals 1 tablet  1 tablet Oral QAM Ryley A Mihok, DO        rivastigmine (EXELON) capsule 1.5 mg  1.5 mg Oral BID Jacki Honer Mihok, DO   1.5 mg at 08/17/22 6474    vitamin E capsule 400 Units  400 Units Oral QAM Ryley A Mihok, DO        vitamin B-12 (CYANOCOBALAMIN) tablet 500 mcg  500 mcg Oral QAM Ryley A Mihok, DO        0.9 % sodium chloride infusion   IntraVENous Continuous Jacki Honer Mihok,  mL/hr at 08/16/22 2012 New Bag at 08/16/22 2012    potassium chloride (KLOR-CON M) extended release tablet 40 mEq  40 mEq Oral PRN Jacki Honer Mihok, DO        Or    potassium bicarb-citric acid (EFFER-K) effervescent tablet 40 mEq  40 mEq Oral PRN Jacki Honer Mihok, DO        Or    potassium chloride 10 mEq/100 mL IVPB (Peripheral Line)  10 mEq IntraVENous PRN Jacki Honer Mihok, DO        acetaminophen (TYLENOL) tablet 650 mg  650 mg Oral Q4H PRN Hilelva Founds, DO           Past Medical History:   Diagnosis Date    Abdominal aortic aneurysm (AAA) 3.0 cm to 5.5 cm in diameter in male [facility]) 06/14/2019    wife unaware    Acute respiratory failure with hypoxia ([address] 6/14/2019    Alzheimer's dementia without behavioral disturbance ([address] 9/14/2017    Aspiration pneumonia ([address] 6/14/2019    Balance problem 4/4/2022    Carpal tunnel syndrome of left wrist 2/19/2021    Cervical radiculopathy 2/19/2021    COVID-19 12/2020    CVA (cerebral vascular accident) ([address]     x 3    DDD (degenerative disc disease), cervical 6/21/2021    DDD (degenerative disc disease), lumbar 3/1/2021    Dementia ([address]     Depression     Heart murmur     Hematemesis     history of    History of stroke 9/14/2017    L-S radiculopathy 2/19/2021    Lactic acidosis 6/11/2019    Left arm numbness 6/24/2020    Lumbosacral spondylosis without myelopathy 3/1/2021    Neuropathy 9/14/2017    RAF (obstructive sleep apnea)     wears CPAP    Osteoarthritis     Peripheral polyneuropathy Persistent postural-perceptual dizziness 2021    Pneumonia 2019    Prostate cancer ([facility] 2007    SBO (small bowel obstruction) ([facility] 2019    Sepsis ([facility] 2019    Present on admission    Sleep apnea 10/22/2019    Snoring 10/22/2019    Spinal stenosis of lumbar region 3/1/2021    Stage 3 chronic kidney disease ([facility] 2019       Past Surgical History:   Procedure Laterality Date    CATARACT REMOVAL      HERNIA REPAIR      PAIN MANAGEMENT PROCEDURE Bilateral 3/8/2021    # 1 LUMBAR TRANSFORAMINAL EPIDURAL STEROID INJECTION bilateral L4 UNDER FLUOROSCOPIC GUIDANCE performed by Steve Douglass MD at [facility]      beacons/radiation    UPPER GASTROINTESTINAL ENDOSCOPY N/A 6/15/2019    EGD ESOPHAGOGASTRODUODENOSCOPY WITH BIOPSY performed by Danna Meigs, MD at [facility] OR       Family History   Problem Relation Age of Onset    Heart Disease Father     Crohn's Disease Sister        Social History     Socioeconomic History    Marital status:      Spouse name: Not on file    Number of children: Not on file    Years of education: Not on file    Highest education level: Not on file   Occupational History    Occupation: [employer]-retired   Tobacco Use    Smoking status: Former     Packs/day: 1.00     Years: 34.00     Pack years: 34.00     Types: Cigarettes     Start date: 1959     Quit date: 1987     Years since quittin.2    Smokeless tobacco: Never   Vaping Use    Vaping Use: Never used   Substance and Sexual Activity    Alcohol use: Not Currently     Comment: 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 Eyes:  No blurring, diplopia or vision loss. Ears/Nose/Throat:  No hearing loss or vertigo. Respiratory:  No cough, pleuritic chest pain, dyspnea, or wheezing. History of pneumonia, obstructive sleep apnea    Cardiovascular: No angina, palpitations . Gastrointestinal:  No nausea or vomiting; no abdominal pain or rectal bleeding. Nausea vomiting, melena    Musculoskeletal:  No arthritis or weakness. Cervical radiculopathy    Neurologic:  No paralysis, paresis, seizures or headaches. History of stroke, currently him, confusion, some difficulty speech and thought process    Hematologic/Lymphatic/Immunologic:  No anemia, abnormal bleeding/bruising. Endocrine:  No heat or cold intolerance. No polyphagia, polydipsia or polyuria. Nephrology: Mild renal insufficiency, creatinine 1.5 on admission improving      Physical Exam:  BP (!) 106/56   Pulse 84   Temp 98.5 °F (36.9 °C) (Oral)   Resp 16   Ht 5' 11\" (1.803 m)   Wt 180 lb 1.6 oz (81.7 kg)   SpO2 90%   BMI 25.12 kg/m²   General appearance:  Alert, awake, oriented x 3. No distress. Skin:  Warm and dry. Head:  Normocephalic. No masses, lesions or tenderness. Eyes:  Conjunctivae appear normal; PERRL. Ears:  External ears normal.  Nose/Sinuses:  Septum midline, mucosa normal; no drainage. Oropharynx:  Clear, no exudate noted. Neck:  No jugular venous distention, lymphadenopathy or thyromegaly. No evidence of carotid bruit      Lungs:  Clear to ausculation bilaterally. No rhonchi, crackles, wheezes. Heart:  Regular rate and rhythm. No rub or murmur. .    Abdomen:  Soft, non-tender. No masses, organomegaly. On deep palpation, minimally prominent aortic pulsation noted, nontender    Musculoskeletal: No joint effusions, tenderness swelling or warmth. Neuro: Speech is intact. Moving all extremities. No focal motor or sensory deficits. Extremities:  Both feet are warm to touch. The color of both feet is normal.          Pulses Right  Left    Brachial 3 3    Radial    3=normal   Femoral 2 2  2=diminished   Popliteal    1=barely palpable   Dorsalis pedis 1 1  0=absent   Posterior tibial    4=aneurysmal           Other pertinent information:1. The past medical records were reviewed. 2.    Lab Results   Component Value Date    WBC 8.2 08/16/2022    HGB 13.0 08/17/2022    HCT 38.8 08/17/2022    MCV 97.7 08/16/2022     08/16/2022      Lab Results   Component Value Date     08/17/2022    K 4.2 08/17/2022     08/17/2022    CO2 22 08/17/2022    BUN 29 (H) 08/17/2022    CREATININE 1.3 (H) 08/17/2022    GLUCOSE 98 08/17/2022    CALCIUM 8.3 (L) 08/17/2022    PROT 5.2 (L) 08/17/2022    LABALBU 3.1 (L) 08/17/2022    BILITOT 1.0 08/17/2022    ALKPHOS 84 08/17/2022    AST 12 08/17/2022    ALT 8 08/17/2022    LABGLOM 53 08/17/2022    GFRAA >60 08/17/2022     No results found for: APTT   No results found for: INR, PROTIME     3.   CT ABDOMEN PELVIS W IV CONTRAST Additional Contrast? None   Final Result   1. Liquid stool in the colon indicates a diarrheal illness. No bowel wall   thickening or obstruction noted. 2. Apparent mural thickening of the stomach may be an artifact of under   distension or signify gastritis. 3. Aneurysmal dilation of the infrarenal aorta to 3.5 cm. Per the   recommendations of the [employer] of Radiology, imaging follow-up is   recommended every 2 years. 4. Calcified pleural and diaphragmatic plaques indicating asbestos exposure. CT Head WO Contrast   Final Result   Addendum (preliminary) 1 of 1   ADDENDUM:   Nonspecific lytic lesions are identified on C6 and C7. Final   No acute intracranial abnormality.   There is age-appropriate atrophy,   small-vessel ischemic disease, old cortical infarct in the left occipital   lobe, and lacunar CVAs in the left basal ganglia and right centrum semiovale. CT cervical spine. There is no acute fracture or dislocation in the cervical spine. The   prevertebral soft tissues are normal.  Diffuse degenerative changes are   identified from C2-T1 with osteophytes and multilevel disc bulges. There is   scarring and apical pleural thickening partially identified. Impression      No acute fractures. Diffuse degenerative changes from C2-T1. CT Cervical Spine WO Contrast   Final Result   Addendum (preliminary) 1 of 1   ADDENDUM:   Nonspecific lytic lesions are identified on C6 and C7. Final   No acute intracranial abnormality. There is age-appropriate atrophy,   small-vessel ischemic disease, old cortical infarct in the left occipital   lobe, and lacunar CVAs in the left basal ganglia and right centrum semiovale. CT cervical spine. There is no acute fracture or dislocation in the cervical spine. The   prevertebral soft tissues are normal.  Diffuse degenerative changes are   identified from C2-T1 with osteophytes and multilevel disc bulges. There is   scarring and apical pleural thickening partially identified. Impression      No acute fractures. Diffuse degenerative changes from C2-T1. US GALLBLADDER RUQ   Final Result   Unremarkable right upper quadrant ultrasound. MRI BRAIN WO CONTRAST    (Results Pending)   MRI CERVICAL SPINE WO CONTRAST    (Results Pending)   VL DUP CAROTID BILATERAL    (Results Pending)   US CAROTID ARTERY BILATERAL    (Results Pending)       4. The history physical, general surgery consultation notes were reviewed    5. CT scan abdomen pelvis was personally reviewed by me, approximate 3.1 x 3.4 cm abdominal aortic aneurysm    6. The CTA of the carotids done in 2020 was personally reviewed, pleasant 50 to 60% right carotid stenosis      Assessment:    1. Asymptomatic abdominal aortic enzyme, 3.1 x 3.4 cm    2.   History of right carotid stenosis, 50 to 60% based upon CTA  of the carotid in 2020    3. Multiple, risk factors, current hospitalization for nausea, vomiting, melena, history of mild renal insufficiency, obstructive sleep apnea, dementia, hypertension, hyperlipidemia, cervical radiculopathy      Patient Active Problem List   Diagnosis    Alzheimer's dementia without behavioral disturbance [facility]    History of stroke    Stage 3 chronic kidney disease ([facility]    Abdominal aortic aneurysm (AAA) 3.0 cm to 5.5 cm in diameter in male [facility])    Sleep apnea    L-S radiculopathy    Cervical radiculopathy    Carpal tunnel syndrome of left wrist    DDD (degenerative disc disease), lumbar    Lumbosacral spondylosis without myelopathy    Spinal stenosis of lumbar region    DDD (degenerative disc disease), cervical    Persistent postural-perceptual dizziness    Balance problem    Prostate cancer (HCC)    Dementia (HCC)    RAF (obstructive sleep apnea)    GI bleed    Carotid stenosis, right            Plan:           Discussed in detail with the patient and his wife Mervat Fleming in the room, telephone #[phone], all options, risks benefits and alternatives were explained    The natural history of abdominal aortic aneurysm,  slow gradual increase over many years were explained, follow-up in 1 year but call and come to the hospital with abdominal pain or back pain    Because of known right carotid stenosis of the proximal 50 to 60%, based upon testing in 2 years ago, patient recommend follow-up carotid ultrasound and to call if any strokelike symptoms      All the questions were answered.     Thank you for letting us participate in the care of your patient      Electronically signed by Cheyanne Woodall MD on 8/17/2022 at 1:33 PM

## 2022-08-17 NOTE — PROGRESS NOTES
Nurse to nurse report phoned to receiving unit.   CMR confirmed that patient telemetry is showing in central monitoring

## 2022-08-18 ENCOUNTER — TELEPHONE (OUTPATIENT)
Dept: VASCULAR SURGERY | Age: 82
End: 2022-08-18

## 2022-08-18 VITALS
BODY MASS INDEX: 25.84 KG/M2 | OXYGEN SATURATION: 96 % | TEMPERATURE: 97.9 F | HEIGHT: 71 IN | RESPIRATION RATE: 16 BRPM | SYSTOLIC BLOOD PRESSURE: 117 MMHG | HEART RATE: 79 BPM | DIASTOLIC BLOOD PRESSURE: 92 MMHG | WEIGHT: 184.6 LBS

## 2022-08-18 LAB
ALBUMIN SERPL-MCNC: 3.1 G/DL (ref 3.5–5.2)
ALP BLD-CCNC: 93 U/L (ref 40–129)
ALT SERPL-CCNC: 8 U/L (ref 0–40)
ANION GAP SERPL CALCULATED.3IONS-SCNC: 10 MMOL/L (ref 7–16)
AST SERPL-CCNC: 12 U/L (ref 0–39)
BILIRUB SERPL-MCNC: 0.5 MG/DL (ref 0–1.2)
BILIRUBIN DIRECT: <0.2 MG/DL (ref 0–0.3)
BILIRUBIN, INDIRECT: ABNORMAL MG/DL (ref 0–1)
BUN BLDV-MCNC: 18 MG/DL (ref 6–23)
CALCIUM SERPL-MCNC: 8.2 MG/DL (ref 8.6–10.2)
CHLORIDE BLD-SCNC: 106 MMOL/L (ref 98–107)
CO2: 22 MMOL/L (ref 22–29)
CREAT SERPL-MCNC: 1.2 MG/DL (ref 0.7–1.2)
GFR AFRICAN AMERICAN: >60
GFR NON-AFRICAN AMERICAN: 58 ML/MIN/1.73
GLUCOSE BLD-MCNC: 100 MG/DL (ref 74–99)
HCT VFR BLD CALC: 38.8 % (ref 37–54)
HCT VFR BLD CALC: 39.8 % (ref 37–54)
HEMOGLOBIN: 13.1 G/DL (ref 12.5–16.5)
HEMOGLOBIN: 13.6 G/DL (ref 12.5–16.5)
LACTIC ACID, SEPSIS: 1 MMOL/L (ref 0.5–1.9)
MAGNESIUM: 1.7 MG/DL (ref 1.6–2.6)
PHOSPHORUS: 3.4 MG/DL (ref 2.5–4.5)
POTASSIUM SERPL-SCNC: 3.9 MMOL/L (ref 3.5–5)
PROSTATE SPECIFIC ANTIGEN: <0.01 NG/ML (ref 0–4)
SEDIMENTATION RATE, ERYTHROCYTE: 7 MM/HR (ref 0–15)
SODIUM BLD-SCNC: 138 MMOL/L (ref 132–146)
TOTAL PROTEIN: 5 G/DL (ref 6.4–8.3)

## 2022-08-18 PROCEDURE — C9113 INJ PANTOPRAZOLE SODIUM, VIA: HCPCS | Performed by: SURGERY

## 2022-08-18 PROCEDURE — 2580000003 HC RX 258: Performed by: ANESTHESIOLOGY

## 2022-08-18 PROCEDURE — 36415 COLL VENOUS BLD VENIPUNCTURE: CPT

## 2022-08-18 PROCEDURE — 85014 HEMATOCRIT: CPT

## 2022-08-18 PROCEDURE — 85651 RBC SED RATE NONAUTOMATED: CPT

## 2022-08-18 PROCEDURE — 80076 HEPATIC FUNCTION PANEL: CPT

## 2022-08-18 PROCEDURE — A4216 STERILE WATER/SALINE, 10 ML: HCPCS | Performed by: SURGERY

## 2022-08-18 PROCEDURE — 80048 BASIC METABOLIC PNL TOTAL CA: CPT

## 2022-08-18 PROCEDURE — 83735 ASSAY OF MAGNESIUM: CPT

## 2022-08-18 PROCEDURE — 6360000002 HC RX W HCPCS: Performed by: SURGERY

## 2022-08-18 PROCEDURE — 85018 HEMOGLOBIN: CPT

## 2022-08-18 PROCEDURE — 6370000000 HC RX 637 (ALT 250 FOR IP): Performed by: SURGERY

## 2022-08-18 PROCEDURE — 83605 ASSAY OF LACTIC ACID: CPT

## 2022-08-18 PROCEDURE — 84100 ASSAY OF PHOSPHORUS: CPT

## 2022-08-18 PROCEDURE — 84153 ASSAY OF PSA TOTAL: CPT

## 2022-08-18 PROCEDURE — 2580000003 HC RX 258: Performed by: SURGERY

## 2022-08-18 RX ORDER — PANTOPRAZOLE SODIUM 40 MG/1
40 TABLET, DELAYED RELEASE ORAL 2 TIMES DAILY
Qty: 30 TABLET | Refills: 3 | Status: SHIPPED | OUTPATIENT
Start: 2022-08-18

## 2022-08-18 RX ORDER — SUCRALFATE 1 G/1
1 TABLET ORAL 4 TIMES DAILY
Qty: 120 TABLET | Refills: 3 | Status: SHIPPED | OUTPATIENT
Start: 2022-08-18 | End: 2022-09-17

## 2022-08-18 RX ORDER — ASPIRIN 81 MG/1
81 TABLET, CHEWABLE ORAL EVERY MORNING
COMMUNITY
Start: 2022-08-18

## 2022-08-18 RX ADMIN — MEMANTINE HYDROCHLORIDE 10 MG: 10 TABLET, FILM COATED ORAL at 10:07

## 2022-08-18 RX ADMIN — CYANOCOBALAMIN TAB 1000 MCG 500 MCG: 1000 TAB at 10:15

## 2022-08-18 RX ADMIN — AMLODIPINE BESYLATE 10 MG: 10 TABLET ORAL at 10:11

## 2022-08-18 RX ADMIN — CALCIUM CARBONATE-VITAMIN D TAB 500 MG-200 UNIT 1 TABLET: 500-200 TAB at 10:05

## 2022-08-18 RX ADMIN — MULTIPLE VITAMINS W/ MINERALS TAB 1 TABLET: TAB at 10:15

## 2022-08-18 RX ADMIN — SUCRALFATE 1 G: 1 TABLET ORAL at 00:56

## 2022-08-18 RX ADMIN — Medication 400 UNITS: at 10:07

## 2022-08-18 RX ADMIN — DIAZEPAM 5 MG: 5 TABLET ORAL at 10:06

## 2022-08-18 RX ADMIN — SODIUM CHLORIDE, PRESERVATIVE FREE 10 ML: 5 INJECTION INTRAVENOUS at 10:11

## 2022-08-18 RX ADMIN — ATORVASTATIN CALCIUM 40 MG: 40 TABLET, FILM COATED ORAL at 10:09

## 2022-08-18 RX ADMIN — RIVASTIGMINE TARTRATE 1.5 MG: 1.5 CAPSULE ORAL at 10:08

## 2022-08-18 RX ADMIN — SODIUM CHLORIDE: 9 INJECTION, SOLUTION INTRAVENOUS at 05:58

## 2022-08-18 RX ADMIN — SODIUM CHLORIDE 40 MG: 9 INJECTION INTRAMUSCULAR; INTRAVENOUS; SUBCUTANEOUS at 10:09

## 2022-08-18 RX ADMIN — SUCRALFATE 1 G: 1 TABLET ORAL at 12:48

## 2022-08-18 RX ADMIN — SUCRALFATE 1 G: 1 TABLET ORAL at 05:56

## 2022-08-18 ASSESSMENT — PAIN SCALES - GENERAL: PAINLEVEL_OUTOF10: 0

## 2022-08-18 NOTE — CARE COORDINATION
Met w/ patient and also spoke w/ wife Mila Lazaro [phone]. Explained role of  and plan of care. Lives in a 2 story house- resides on 1st floor- 2 steps to entrance. Uses [employer], rollator, cane, has walk-in shower w/ built in shower seat. [facility] [place]. No [facility]. PCP is Dr. Gabriel Garcia. PT am-pac 17. Per wife, plan is for pt to return home on discharge- declining [facility]- wife will provide transportation home. Anticipating no needs.  Will follow Kateryna White, RNcase manager

## 2022-08-18 NOTE — PROGRESS NOTES
GENERAL SURGERY  DAILY PROGRESS NOTE  8/18/2022    Chief Complaint   Patient presents with    Abdominal Pain    Diarrhea    Emesis     Sent by Dr. Cory Denton +N/V/D x 3 days, low bilateral abdominal pain    Melena     No thinners           Subjective:  Pt states he is doing well. Denies any abdominal pain. No further melena. Objective:  BP (!) 97/46   Pulse 80   Temp 98.1 °F (36.7 °C) (Oral)   Resp 16   Ht 5' 11\" (1.803 m)   Wt 184 lb 9.6 oz (83.7 kg)   SpO2 92%   BMI 25.75 kg/m²     GENERAL:  Laying in bed, awake, alert, cooperative, no apparent distress  HEAD: Normocephalic, atraumatic  EYES: No sclera icterus, pupils equal  LUNGS:  No increased work of breathing on room air  CARDIOVASCULAR:  RR and borderline hypotensive  ABDOMEN:  Soft, non-tender, non-distended  EXTREMITIES: No edema or swelling  SKIN: Warm and dry    Assessment/Plan:  79 y.o. male with melena s/p EGD with small antral ulcer 8/17     - Okay for peptic ulcer diet as tolerated  - PPI BID and Carafate  - Monitor Hgb, transfuse as needed  - Hgb stable, can follow up as an outpatient    Electronically signed by To Chapa MD on 8/18/2022 at 6:06 AM    The patient was seen and examined and the chart was reviewed. I agree with the assessment and plan. The patient's hemoglobin is 13.6 and the hematocrit is 39.8. The patient denies any symptomatology. Continue the PPI and Carafate. Continue the bland diet.

## 2022-08-18 NOTE — PROGRESS NOTES
PROGRESS  NOTE --                                                          INTERNAL  MEDICINE                                                                              I  PERSONALLY SAW , EXAMINED, AND CARED [address] [facility], 8/18/2022     LABS, XRAY ,CHART, AND MEDICATIONS  REVIEWED BY ME       Chief complaint: Nausea, melena, emesis      8/18/2022-SUBJECTIVE: Madyson Velarde is alert awake and cooperative; oriented ×3. Denies any chest pain dyspnea nausea emesis. Tolerating diet. No abdominal pain. Sitting in bedside chair hoping for discharge. States has had no diarrhea x48 hours feels well enough for discharge. I discussed all findings including carotid ultrasound, abdominal aortic aneurysm, endoscopy results, stable H&H etc. Afebrile last 24 hours. Blood pressure 117/53. SPO2 94 on room air. Intake and output -2190 cc. Fasting glucose 100 protein 5.0. BUN 18 creatinine 1.2. Liver functions normal.  Hemoglobin 13.6. PSA less than 0.01. Vascular consult from yesterday appreciated. Abdominal aortic aneurysm unchanged from 1 year earlier. Known right carotid stenosis 50-60% from 2 years ago, recommend repeat carotid ultrasound. Follow-up aneurysm 1 year. Physical therapy [facility] score 17/24 from yesterday. Patient underwent EGD yesterday with following results--    PROCEDURE:  The patient was brought into the endoscopy suite and placed in the left lateral decubitus position. A bite block was placed in the patients mouth. After the initiation of LMAC anesthesia, a gastroscope was inserted into the patient's mouth and passed into the esophagus and into the stomach. Immediately upon entering the stomach the note of mild gastritis with superficial gastric ulcerations was made. The scope was advanced through the pylorus into the first and second portion of the duodenum making the note of a normal duodenum. The scope was then withdrawn back into the stomach where it was retroflexed. There was 3 cm hiatal hernia noted. The scope was then straightened. The scope was then withdrawn the entire length of the esophagus, making the note of a normal esophagus without masses, ulcerations, or lesions noted. The scope was withdrawn entirely. The patient tolerated the procedure well and there were no complications. Dr. Marty Garciasr was present for the procedure. Surgical note from today, \"patient states he is doing well no abdominal pain no melena. Okay for peptic ulcer diet; PPI twice daily as well as Carafate. Can follow-up as outpatient. Patient underwent MRI of brain yesterday with following results--    FINDINGS:   INTRACRANIAL STRUCTURES/VENTRICLES: There is no acute infarct. There is a   chronic infarction in the left occipital lobe. Small chronic lacunar   infarction is seen in the left centrum semiovale. Moderate volume loss is   seen in the cerebrum with moderate chronic microvascular ischemic changes. No hydrocephalus or extra-axial fluid is seen. ORBITS: Prosthetic lenses are seen in the globes bilaterally. The orbits are   otherwise grossly unremarkable. SINUSES: Mild mucosal thickening in the paranasal sinuses. Minimal fluid in   the inferior mastoid air cells, which are otherwise grossly clear. BONES/SOFT TISSUES: The bone marrow signal intensity appears normal. The soft   tissues demonstrate no acute abnormality. Impression:       1. No acute intracranial abnormality. 2. Chronic infarction in the left occipital lobe. Small chronic lacunar   infarction in the right centrum semiovale. 3. Moderate cerebral volume loss with moderate chronic microvascular ischemic   changes.         Patient underwent MRI C-spine yesterday with following results--    FINDINGS:   BONES/ALIGNMENT: At the mid to lower aspect of the cervical spine, there is   minor reversal of the normal lordosis attributed to degenerative change and   stable compared to the prior CT and MRI. Vertebral body heights and marrow   signal are normal.     SPINAL CORD: No abnormal cord signal is seen. SOFT TISSUES: No paraspinal mass identified. C2-C3: Mild disc space narrowing and posterior disc bulge. Mild left facet   hypertrophy. Mild left foraminal stenosis. C3-C4: Mild disc space narrowing, posterior disc bulge and facet hypertrophy. Mild bilateral foraminal stenosis. C4-C5: Mild disc space narrowing, posterior disc bulge and facet hypertrophy. Moderate left foraminal stenosis. C5-C6: Moderate disc space narrowing with minimal posterior disc bulge. No   stenosis. C6-C7: Moderate disc space narrowing with mild posterior disc bulge and facet   hypertrophy. Bilateral foraminal stenosis moderate on the right and mild on   the left. C7-T1: Marked disc space narrowing and mild posterior disc bulge. Moderate   right foraminal stenosis. Impression:       No central canal stenosis or cord abnormality identified. Mild diffuse cervical spine degenerative changes with foraminal stenosis as   detailed above. Patient had Doppler ultrasound of carotids done yesterday ordered by vascular with following results--    FINDINGS:     RIGHT:     The right common carotid artery demonstrates peak systolic velocities of 63   and 62 cm/sec in the proximal and distal segments respectively. The right internal carotid artery demonstrates the systolic velocities of   243, 207 and 130 cm/sec in the proximal, mid and distal segments respectively. The external carotid artery is patent. The vertebral artery demonstrates   normal antegrade flow. Severe proximal internal carotid artery calcified plaque is seen. ICA/CCA ratio of 4.3       LEFT:     The left common carotid artery demonstrates peak systolic velocities of 774   and 73 cm/sec in the proximal and distal segments respectively. The left internal carotid artery demonstrates the systolic velocities of 97,   79 and 74 cm/sec in the proximal, mid and distal segments respectively. The external carotid artery is patent. The vertebral artery demonstrates   normal antegrade flow. Mild proximal internal carotid artery calcified plaque is noted. ICA/CCA ratio of 1.3       Impression:       The right internal carotid artery demonstrates 70-99% stenosis. The left internal carotid artery demonstrates 0-50% stenosis. Bilateral vertebral arteries are patent with flow in the normal direction.         Objective:     PHYSICAL EXAM:    VS: BP (!) 117/53   Pulse 87   Temp 98.4 °F (36.9 °C) (Oral)   Resp 16   Ht 5' 11\" (1.803 m)   Wt 184 lb 9.6 oz (83.7 kg)   SpO2 94%   BMI 25.75 kg/m²     Labs:   CBC:   Lab Results   Component Value Date/Time    WBC 8.2 08/16/2022 02:32 PM    RBC 4.86 08/16/2022 02:32 PM    HGB 13.6 08/18/2022 10:25 AM    HCT 39.8 08/18/2022 10:25 AM    MCV 97.7 08/16/2022 02:32 PM    MCH 32.5 08/16/2022 02:32 PM    MCHC 33.3 08/16/2022 02:32 PM    RDW 12.6 08/16/2022 02:32 PM     08/16/2022 02:32 PM    MPV 10.0 08/16/2022 02:32 PM     CBC with Differential:    Lab Results   Component Value Date/Time    WBC 8.2 08/16/2022 02:32 PM    RBC 4.86 08/16/2022 02:32 PM    HGB 13.6 08/18/2022 10:25 AM    HCT 39.8 08/18/2022 10:25 AM     08/16/2022 02:32 PM    MCV 97.7 08/16/2022 02:32 PM    MCH 32.5 08/16/2022 02:32 PM    MCHC 33.3 08/16/2022 02:32 PM    RDW 12.6 08/16/2022 02:32 PM    LYMPHOPCT 31.6 08/16/2022 02:32 PM    MONOPCT 15.4 08/16/2022 02:32 PM    BASOPCT 0.1 08/16/2022 02:32 PM    MONOSABS 1.26 08/16/2022 02:32 PM    LYMPHSABS 2.59 08/16/2022 02:32 PM    EOSABS 0.24 08/16/2022 02:32 PM    BASOSABS 0.01 08/16/2022 02:32 PM     Hemoglobin/Hematocrit:    Lab Results   Component Value Date/Time    HGB 13.6 08/18/2022 10:25 AM    HCT 39.8 08/18/2022 10:25 AM     CMP:    Lab Results   Component Value Date/Time     08/18/2022 04:22 AM    K 3.9 08/18/2022 04:22 AM    K 4.5 08/16/2022 02:32 PM     08/18/2022 04:22 AM    CO2 22 08/18/2022 04:22 AM    BUN 18 08/18/2022 04:22 AM    CREATININE 1.2 08/18/2022 04:22 AM    GFRAA >60 08/18/2022 04:22 AM    LABGLOM 58 08/18/2022 04:22 AM    GLUCOSE 100 08/18/2022 04:22 AM    PROT 5.0 08/18/2022 04:22 AM    LABALBU 3.1 08/18/2022 04:22 AM    CALCIUM 8.2 08/18/2022 04:22 AM    BILITOT 0.5 08/18/2022 04:22 AM    ALKPHOS 93 08/18/2022 04:22 AM    AST 12 08/18/2022 04:22 AM    ALT 8 08/18/2022 04:22 AM     BMP:    Lab Results   Component Value Date/Time     08/18/2022 04:22 AM    K 3.9 08/18/2022 04:22 AM    K 4.5 08/16/2022 02:32 PM     08/18/2022 04:22 AM    CO2 22 08/18/2022 04:22 AM    BUN 18 08/18/2022 04:22 AM    LABALBU 3.1 08/18/2022 04:22 AM    CREATININE 1.2 08/18/2022 04:22 AM    CALCIUM 8.2 08/18/2022 04:22 AM    GFRAA >60 08/18/2022 04:22 AM    LABGLOM 58 08/18/2022 04:22 AM    GLUCOSE 100 08/18/2022 04:22 AM     Hepatic Function Panel:    Lab Results   Component Value Date/Time    ALKPHOS 93 08/18/2022 04:22 AM    ALT 8 08/18/2022 04:22 AM    AST 12 08/18/2022 04:22 AM    PROT 5.0 08/18/2022 04:22 AM    BILITOT 0.5 08/18/2022 04:22 AM    BILIDIR <0.2 08/18/2022 04:22 AM    IBILI see below 08/18/2022 04:22 AM    LABALBU 3.1 08/18/2022 04:22 AM     Ionized Calcium:  No results found for: IONCA  Magnesium:    Lab Results   Component Value Date/Time    MG 1.7 08/18/2022 04:22 AM     Phosphorus:    Lab Results   Component Value Date/Time    PHOS 3.4 08/18/2022 04:22 AM     LDH:  No results found for: LDH  Uric Acid:    Lab Results   Component Value Date/Time    LABURIC 7.3 08/17/2022 02:45 AM     PT/INR:  No results found for: PROTIME, INR  Warfarin PT/INR:  No components found for: PTPATWAR, PTINRWAR  PTT:  No results found for: APTT, PTT[APTT}  Troponin:    Lab Results   Component Value Date/Time    TROPONINI <0.01 06/11/2019 02:46 PM     Last 3 Troponin:    Lab Results   Component Value Date/Time    TROPONINI <0.01 06/11/2019 02:46 PM     U/A:    Lab Results   Component Value Date/Time    COLORU Yellow 08/16/2022 03:41 PM    PROTEINU Negative 08/16/2022 03:41 PM    PHUR 5.5 08/16/2022 03:41 PM    WBCUA 0-1 08/16/2022 03:41 PM    RBCUA NONE 08/16/2022 03:41 PM    BACTERIA NONE SEEN 08/16/2022 03:41 PM    CLARITYU Clear 08/16/2022 03:41 PM    SPECGRAV 1.020 08/16/2022 03:41 PM    LEUKOCYTESUR Negative 08/16/2022 03:41 PM    UROBILINOGEN 0.2 08/16/2022 03:41 PM    BILIRUBINUR Negative 08/16/2022 03:41 PM    BLOODU Negative 08/16/2022 03:41 PM    GLUCOSEU Negative 08/16/2022 03:41 PM     HgBA1c:    Lab Results   Component Value Date/Time    LABA1C 5.7 08/16/2022 08:25 PM     FLP:    Lab Results   Component Value Date/Time    TRIG 98 08/17/2022 02:45 AM    HDL 25 08/17/2022 02:45 AM    LDLCALC 37 08/17/2022 02:45 AM    LABVLDL 20 08/17/2022 02:45 AM     TSH:    Lab Results   Component Value Date/Time    TSH 2.550 08/17/2022 02:45 AM     VITAMIN B12: No components found for: B12  FOLATE:    Lab Results   Component Value Date/Time    FOLATE 17.3 08/16/2022 08:25 PM     IRON:    Lab Results   Component Value Date/Time    IRON 76 08/17/2022 02:45 AM     Iron Saturation:  No components found for: PERCENTFE  TIBC:    Lab Results   Component Value Date/Time    TIBC 200 08/17/2022 02:45 AM     FERRITIN:    Lab Results   Component Value Date/Time    FERRITIN 245 08/17/2022 02:45 AM     PSA:   Lab Results   Component Value Date/Time    PSA <0.01 08/18/2022 04:22 AM        General appearance: Alert, Awake, Oriented times 3, no distress   Skin: Warm and dry ; no rashes  Head: Normocephalic. No masses, lesions or tenderness noted  Eyes: Conjunctivae pink, sclera white. PERRL,EOM-I  Ears: External ears normal  Nose/Sinuses: Nares normal. Septum midline. Mucosa normal. No drainage  Oropharynx: Oropharynx clear with no exudate seen  Neck: Supple.  No jugular venous distension, lymphadenopathy or thyromegaly Trachea midline  Lungs: Clear to auscultation bilaterally. No rhonchi, crackles or wheezes  Heart: S1 S2  Regular rate and rhythm. No rub, gallop, murmur  Abdomen: Soft, non-tender. BS normal. No masses, organomegaly; no rebound or guarding  Extremities: 1+ edema, Peripheral pulses palpable  Musculoskeletal: Muscular strength appears intact. Neuro:  No focal motor defects ; II-XII grossly intact . BRIGHT equally    TELEMETRY: REVIEWED--Telemetry: Sinus    ASSESSMENT:   Principal Problem:    GI bleed  Active Problems:    Prostate cancer (HCC)    Dementia (HCC)    RAF (obstructive sleep apnea)    Alzheimer's dementia without behavioral disturbance (HCC)    History of stroke    Stage 3 chronic kidney disease (HCC)    Abdominal aortic aneurysm (AAA) 3.0 cm to 5.5 cm in diameter in male [facility])    Carotid stenosis, right  Resolved Problems:    * No resolved hospital problems. *      PLAN:  SEE ORDERS      RE  CHANGES AND FINDINGS   Medications reviewed with patient  GI prophylaxis  DVT prophylaxis  Consultants notes reviewed  Med reconciliation completed  Prescriptions written  Follow-up Dr. Cory Denton 1 week  Amlodipine 10 mg daily  Atorvastatin 40 mg daily  Diazepam 5 mg each morning  Namenda 10 mg twice daily  Protonix 40 mg by mouth twice daily  Cyanocobalamin 500 mcg daily  Carafate 1 g 4 times daily  Exelon 1.5 mg twice daily      Discussed with patient and nursing. Winifred Tee DO     12:40 PM     8/18/2022    TIME > 35 MINUTES    >  50 %  OF  TIME  DISCUSSION               ------------  INFORMATION  -----------      DIET:ADULT DIET;  Regular; GI Cuming (GERD/Peptic Ulcer)        Allergies   Allergen Reactions    Oxycodone-Acetaminophen Nausea And Vomiting     Other reaction(s): GI Upset    Vicodin [Hydrocodone-Acetaminophen] Nausea And Vomiting         MEDICATION SIDE EFFECTS:none       SCHEDULED MEDS:                                 Scheduled Meds:   pantoprazole (PROTONIX) 40 mg injection  40 mg IntraVENous BID    sodium chloride flush  5-40 mL IntraVENous 2 times per day    sucralfate  1 g Oral 4 times per day    amLODIPine  10 mg Oral QAM    atorvastatin  40 mg Oral QAM    oyster shell calcium w/D  1 tablet Oral Daily    diazePAM  5 mg Oral QAM    memantine  10 mg Oral BID    therapeutic multivitamin-minerals  1 tablet Oral QAM    rivastigmine  1.5 mg Oral BID    vitamin E  400 Units Oral QAM    vitamin B-12  500 mcg Oral QAM       Continuous Infusions:   sodium chloride      sodium chloride 100 mL/hr at 08/18/22 0558         Data:       Intake/Output Summary (Last 24 hours) at 8/18/2022 1240  Last data filed at 8/18/2022 0953  Gross per 24 hour   Intake --   Output 1850 ml   Net -1850 ml       Wt Readings from Last 3 Encounters:   08/18/22 184 lb 9.6 oz (83.7 kg)   08/17/22 180 lb (81.6 kg)   04/04/22 178 lb (80.7 kg)       Labs: Additional    GLUCOSE:No results for input(s): POCGLU in the last 72 hours. BNP:No results found for: BNP    CRP: No results for input(s): CRP in the last 72 hours. ESR:  Recent Labs     08/17/22  0245 08/18/22  0422   SEDRATE 2 7       RADIOLOGY: REVIEWED AVAILABLE REPORT  MRI CERVICAL SPINE WO CONTRAST   Final Result   No central canal stenosis or cord abnormality identified. Mild diffuse cervical spine degenerative changes with foraminal stenosis as   detailed above. MRI BRAIN WO CONTRAST   Final Result   1. No acute intracranial abnormality. 2. Chronic infarction in the left occipital lobe. Small chronic lacunar   infarction in the right centrum semiovale. 3. Moderate cerebral volume loss with moderate chronic microvascular ischemic   changes. RECOMMENDATIONS:   Unavailable         US CAROTID ARTERY BILATERAL   Final Result   The right internal carotid artery demonstrates 70-99% stenosis. The left internal carotid artery demonstrates 0-50% stenosis.       Bilateral vertebral arteries are patent with flow in the normal direction. CT ABDOMEN PELVIS W IV CONTRAST Additional Contrast? None   Final Result   1. Liquid stool in the colon indicates a diarrheal illness. No bowel wall   thickening or obstruction noted. 2. Apparent mural thickening of the stomach may be an artifact of under   distension or signify gastritis. 3. Aneurysmal dilation of the infrarenal aorta to 3.5 cm. Per the   recommendations of the [employer] of Radiology, imaging follow-up is   recommended every 2 years. 4. Calcified pleural and diaphragmatic plaques indicating asbestos exposure. CT Head WO Contrast   Final Result   Addendum (preliminary) 1 of 1   ADDENDUM:   Nonspecific lytic lesions are identified on C6 and C7. Final   No acute intracranial abnormality. There is age-appropriate atrophy,   small-vessel ischemic disease, old cortical infarct in the left occipital   lobe, and lacunar CVAs in the left basal ganglia and right centrum semiovale. CT cervical spine. There is no acute fracture or dislocation in the cervical spine. The   prevertebral soft tissues are normal.  Diffuse degenerative changes are   identified from C2-T1 with osteophytes and multilevel disc bulges. There is   scarring and apical pleural thickening partially identified. Impression      No acute fractures. Diffuse degenerative changes from C2-T1. CT Cervical Spine WO Contrast   Final Result   Addendum (preliminary) 1 of 1   ADDENDUM:   Nonspecific lytic lesions are identified on C6 and C7. Final   No acute intracranial abnormality. There is age-appropriate atrophy,   small-vessel ischemic disease, old cortical infarct in the left occipital   lobe, and lacunar CVAs in the left basal ganglia and right centrum semiovale. CT cervical spine. There is no acute fracture or dislocation in the cervical spine.   The   prevertebral soft tissues are normal.  Diffuse degenerative changes are identified from C2-T1 with osteophytes and multilevel disc bulges. There is   scarring and apical pleural thickening partially identified. Impression      No acute fractures. Diffuse degenerative changes from C2-T1. US GALLBLADDER RUQ   Final Result   Unremarkable right upper quadrant ultrasound.                    [address],     12:40 PM     8/18/2022      Voice recognition software used for dictation

## 2022-08-19 ENCOUNTER — TELEPHONE (OUTPATIENT)
Dept: VASCULAR SURGERY | Age: 82
End: 2022-08-19

## 2022-08-19 NOTE — DISCHARGE SUMMARY
DISCHARGE SUMMARY  Patient ID:  Daphne Ni  [id number]  2 y.o.  1940    Admit date: 8/16/2022      Discharge date : 8/18/2022      Admission Diagnoses: GI bleed [K92.2]  Gastrointestinal hemorrhage, unspecified gastrointestinal hemorrhage type [K92.2]    Discharge Diagnosis  Principal Problem:    GI bleed  Active Problems:    Prostate cancer ([facility]    Dementia (HCC)    RAF (obstructive sleep apnea)    Alzheimer's dementia without behavioral disturbance (HCC)    History of stroke    Stage 3 chronic kidney disease ([facility]    Abdominal aortic aneurysm (AAA) 3.0 cm to 5.5 cm in diameter in male [facility])    Carotid stenosis, right  Resolved Problems:    * No resolved hospital problems. *      Hospital Course:    CHIEF COMPLAINT: Diarrhea, melena, emesis     History of Present Illness: 69-year-old male patient of Dr. Chase Damon I am asked to admit and follow. History obtained from patient, his wife as well as extensive review electronic record. Patient presented to the ED with the above complaints x48 hours after being advised to report to the ED by Dr. Zheng Michel. He is also noticed he has been unsteady on his feet fatigued did fall approximately 2 weeks ago. Wife states he was scheduled for outpatient MRI on his brain and neck to be done yesterday at [facility] however he is admitted. Wife states MRI was to be done for comparison to previous MRIs. Patient with no known aneurysm in the past.  --Patient with history of prostate cancer 2007 for which he underwent radiation. --He has been seen in consultation by surgical service who noted patient is on daily aspirin and had EGD in 2019 showing gastritis. He is now scheduled for EGD today. Keep n.p.o. sips with liquids. Transfuse as needed. --In the ED blood pressure 97/62 temperature 97.7 SPO2 94 on room air. Hemoglobin the ED 15.8, today 13.2. A90 folic acid normal.  Ferritin 245 iron 76 iron saturation 38 TIBC low at 200.   ESR 2.  --Ultrasound of gallbladder was unremarkable. CT of the abdomen with following results--          FINDINGS:   Lower Chest: Mild dependent atelectasis is seen in the lower lobes. The   heart is normal in size. No pleural or pericardial effusion. Calcified   pleural and diaphragmatic plaques indicate prior asbestos exposure. Organs:     Liver: Small cyst in the right lobe. Otherwise, unremarkable. Gallbladder: Unremarkable. Pancreas: Mild to moderately atrophied. Otherwise, unremarkable. Spleen:  Unremarkable. Adrenals: Unremarkable. Kidneys: Unremarkable. GI/Bowel: Liquid stool in the colon indicates a diarrheal illness. Apparent   mural thickening of the gastric wall may be an artifact of under distension   or signify gastritis. Pelvis: The urinary bladder is unremarkable. Brachytherapy seeds are seen in   the prostate gland, which is not enlarged. Peritoneum/Retroperitoneum: Moderate atherosclerosis in the abdominal aorta. Aneurysmal dilation of the infrarenal aorta to approximately 3.5 x 3.1 cm no   lymphadenopathy. No free air or free fluid is seen. Bones/Soft Tissues: The visualized bones are intact without fracture or focal   lesion. Impression:       1. Liquid stool in the colon indicates a diarrheal illness. No bowel wall   thickening or obstruction noted. 2. Apparent mural thickening of the stomach may be an artifact of under   distension or signify gastritis. 3. Aneurysmal dilation of the infrarenal aorta to 3.5 cm. Per the   recommendations of the [employer] of [employer]iology, imaging follow-up is   recommended every 2 years. 4. Calcified pleural and diaphragmatic plaques indicating asbestos exposure. CT of the head done in the ED with following results--          FINDINGS:   BRAIN/VENTRICLES: There is no acute intracranial hemorrhage, mass effect or   midline shift. No abnormal extra-axial fluid collection.   The gray-white   differentiation is maintained without evidence of an acute infarct. There is   no evidence of hydrocephalus. ORBITS: The visualized portion of the orbits demonstrate no acute abnormality. SINUSES: The visualized paranasal sinuses and mastoid air cells demonstrate   no acute abnormality. SOFT TISSUES/SKULL:  No acute abnormality of the visualized skull or soft   tissues. Impression:       No acute intracranial abnormality. There is age-appropriate atrophy,   small-vessel ischemic disease, old cortical infarct in the left occipital   lobe, and lacunar CVAs in the left basal ganglia and right centrum semiovale. 8/18/2022-SUBJECTIVE: Ramiro Ball is alert awake and cooperative; oriented ×3. Denies any chest pain dyspnea nausea emesis. Tolerating diet. No abdominal pain. Sitting in bedside chair hoping for discharge. States has had no diarrhea x48 hours feels well enough for discharge. I discussed all findings including carotid ultrasound, abdominal aortic aneurysm, endoscopy results, stable H&H etc. Afebrile last 24 hours. Blood pressure 117/53. SPO2 94 on room air. Intake and output -2190 cc. Fasting glucose 100 protein 5.0. BUN 18 creatinine 1.2. Liver functions normal.  Hemoglobin 13.6. PSA less than 0.01. Vascular consult from yesterday appreciated. Abdominal aortic aneurysm unchanged from 1 year earlier. Known right carotid stenosis 50-60% from 2 years ago, recommend repeat carotid ultrasound. Follow-up aneurysm 1 year. Physical therapy [facility] score 17/24 from yesterday. Patient underwent EGD yesterday with following results--     PROCEDURE:  The patient was brought into the endoscopy suite and placed in the left lateral decubitus position. A bite block was placed in the patients mouth. After the initiation of LMAC anesthesia, a gastroscope was inserted into the patient's mouth and passed into the esophagus and into the stomach.   Immediately upon entering the stomach the note of mild gastritis with superficial gastric ulcerations was made. The scope was advanced through the pylorus into the first and second portion of the duodenum making the note of a normal duodenum. The scope was then withdrawn back into the stomach where it was retroflexed. There was 3 cm hiatal hernia noted. The scope was then straightened. The scope was then withdrawn the entire length of the esophagus, making the note of a normal esophagus without masses, ulcerations, or lesions noted. The scope was withdrawn entirely. The patient tolerated the procedure well and there were no complications. Dr. Ruma Jimenes was present for the procedure. Surgical note from today, \"patient states he is doing well no abdominal pain no melena. Okay for peptic ulcer diet; PPI twice daily as well as Carafate. Can follow-up as outpatient. Patient underwent MRI of brain yesterday with following results--          FINDINGS:   INTRACRANIAL STRUCTURES/VENTRICLES: There is no acute infarct. There is a   chronic infarction in the left occipital lobe. Small chronic lacunar   infarction is seen in the left centrum semiovale. Moderate volume loss is   seen in the cerebrum with moderate chronic microvascular ischemic changes. No hydrocephalus or extra-axial fluid is seen. ORBITS: Prosthetic lenses are seen in the globes bilaterally. The orbits are   otherwise grossly unremarkable. SINUSES: Mild mucosal thickening in the paranasal sinuses. Minimal fluid in   the inferior mastoid air cells, which are otherwise grossly clear. BONES/SOFT TISSUES: The bone marrow signal intensity appears normal. The soft   tissues demonstrate no acute abnormality. Impression:       1. No acute intracranial abnormality. 2. Chronic infarction in the left occipital lobe. Small chronic lacunar   infarction in the right centrum semiovale. 3. Moderate cerebral volume loss with moderate chronic microvascular ischemic   changes. Patient underwent MRI C-spine yesterday with following results--          FINDINGS:   BONES/ALIGNMENT: At the mid to lower aspect of the cervical spine, there is   minor reversal of the normal lordosis attributed to degenerative change and   stable compared to the prior CT and MRI. Vertebral body heights and marrow   signal are normal.     SPINAL CORD: No abnormal cord signal is seen. SOFT TISSUES: No paraspinal mass identified. C2-C3: Mild disc space narrowing and posterior disc bulge. Mild left facet   hypertrophy. Mild left foraminal stenosis. C3-C4: Mild disc space narrowing, posterior disc bulge and facet hypertrophy. Mild bilateral foraminal stenosis. C4-C5: Mild disc space narrowing, posterior disc bulge and facet hypertrophy. Moderate left foraminal stenosis. C5-C6: Moderate disc space narrowing with minimal posterior disc bulge. No   stenosis. C6-C7: Moderate disc space narrowing with mild posterior disc bulge and facet   hypertrophy. Bilateral foraminal stenosis moderate on the right and mild on   the left. C7-T1: Marked disc space narrowing and mild posterior disc bulge. Moderate   right foraminal stenosis. Impression:       No central canal stenosis or cord abnormality identified. Mild diffuse cervical spine degenerative changes with foraminal stenosis as   detailed above. Patient had Doppler ultrasound of carotids done yesterday ordered by vascular with following results--          FINDINGS:     RIGHT:     The right common carotid artery demonstrates peak systolic velocities of 63   and 62 cm/sec in the proximal and distal segments respectively. The right internal carotid artery demonstrates the systolic velocities of   742, 207 and 130 cm/sec in the proximal, mid and distal segments respectively. The external carotid artery is patent. The vertebral artery demonstrates   normal antegrade flow.      Severe proximal internal carotid artery calcified plaque is seen. ICA/CCA ratio of 4.3       LEFT:     The left common carotid artery demonstrates peak systolic velocities of 639   and 73 cm/sec in the proximal and distal segments respectively. The left internal carotid artery demonstrates the systolic velocities of 97,   79 and 74 cm/sec in the proximal, mid and distal segments respectively. The external carotid artery is patent. The vertebral artery demonstrates   normal antegrade flow. Mild proximal internal carotid artery calcified plaque is noted. ICA/CCA ratio of 1.3       Impression:       The right internal carotid artery demonstrates 70-99% stenosis. The left internal carotid artery demonstrates 0-50% stenosis. Bilateral vertebral arteries are patent with flow in the normal direction. Hospital orders: PLAN:  Medications discussed with patient  GI prophylaxis  DVT prophylaxis--sequential compression device  Consultants notes reviewed  Imodium 4 times daily as needed for diarrhea  Amlodipine 10 mg daily  Atorvastatin 40 mg daily  Valium 5 mg each morning  Namenda 10 mg twice daily  Exelon 1.5 mg capsule twice daily  Aspirin on hold  Therapeutic vitamins  EGD today  Transfuse hemoglobin less than 7  H&H every 8 hours  PSA in a.m.   MRI of brain without contrast for comparison  MRI C-spine without contrast for comparison  Vascular surgery consult regarding aneurysm  Medically stable for EGD today      Instructions at discharge:      RE  CHANGES AND FINDINGS  Medications reviewed with patient  GI prophylaxis  DVT prophylaxis  Consultants notes reviewed  Med reconciliation completed  Prescriptions written  Follow-up Dr. Heena Truong 1 week  Amlodipine 10 mg daily  Atorvastatin 40 mg daily  Diazepam 5 mg each morning  Namenda 10 mg twice daily  Protonix 40 mg by mouth twice daily  Cyanocobalamin 500 mcg daily  Carafate 1 g 4 times daily  Exelon 1.5 mg twice daily    Condition at DISCHARGE : STABLE AND IMPROVED Discharged to: Home    Discharge Instructions: Medications reviewed with patient    Consults:general surgery and vascular surgery     Past Medical Hx :   Past Medical History:   Diagnosis Date    Abdominal aortic aneurysm (AAA) 3.0 cm to 5.5 cm in diameter in male [facility]) 06/14/2019    wife unaware    Acute respiratory failure with hypoxia ([address] 6/14/2019    Alzheimer's dementia without behavioral disturbance ([address] 9/14/2017    Aspiration pneumonia ([address] 6/14/2019    Balance problem 4/4/2022    Carpal tunnel syndrome of left wrist 2/19/2021    Cervical radiculopathy 2/19/2021    COVID-19 12/2020    CVA (cerebral vascular accident) ([address]     x 3    DDD (degenerative disc disease), cervical 6/21/2021    DDD (degenerative disc disease), lumbar 3/1/2021    Dementia ([address]     Depression     Heart murmur     Hematemesis     history of    History of stroke 9/14/2017    L-S radiculopathy 2/19/2021    Lactic acidosis 6/11/2019    Left arm numbness 6/24/2020    Lumbosacral spondylosis without myelopathy 3/1/2021    Neuropathy 9/14/2017    RAF (obstructive sleep apnea)     wears CPAP    Osteoarthritis     Peripheral polyneuropathy     Persistent postural-perceptual dizziness 9/24/2021    Pneumonia 6/11/2019    Prostate cancer ([address] 2007    SBO (small bowel obstruction) ([address] 6/11/2019    Sepsis ([address] 6/14/2019    Present on admission    Sleep apnea 10/22/2019    Snoring 10/22/2019    Spinal stenosis of lumbar region 3/1/2021    Stage 3 chronic kidney disease ([address] 6/14/2019       Past Surgical Hx :  Past Surgical History:   Procedure Laterality Date    CATARACT REMOVAL      HERNIA REPAIR      PAIN MANAGEMENT PROCEDURE Bilateral 3/8/2021    # 1 LUMBAR TRANSFORAMINAL EPIDURAL STEROID INJECTION bilateral L4 UNDER FLUOROSCOPIC GUIDANCE performed by Fallon Calabrese MD at [address]      beacons/radiation    UPPER GASTROINTESTINAL ENDOSCOPY N/A 6/15/2019    EGD ESOPHAGOGASTRODUODENOSCOPY WITH BIOPSY performed by Malika Downey MD at [address] [facility] 8/17/2022    EGD BIOPSY performed by Malika Downey MD at [address]:   CBC:   Lab Results   Component Value Date/Time    WBC 8.2 08/16/2022 02:32 PM    RBC 4.86 08/16/2022 02:32 PM    HGB 13.6 08/18/2022 10:25 AM    HCT 39.8 08/18/2022 10:25 AM    MCV 97.7 08/16/2022 02:32 PM    MCH 32.5 08/16/2022 02:32 PM    MCHC 33.3 08/16/2022 02:32 PM    RDW 12.6 08/16/2022 02:32 PM     08/16/2022 02:32 PM    MPV 10.0 08/16/2022 02:32 PM     CBC with Differential:    Lab Results   Component Value Date/Time    WBC 8.2 08/16/2022 02:32 PM    RBC 4.86 08/16/2022 02:32 PM    HGB 13.6 08/18/2022 10:25 AM    HCT 39.8 08/18/2022 10:25 AM     08/16/2022 02:32 PM    MCV 97.7 08/16/2022 02:32 PM    MCH 32.5 08/16/2022 02:32 PM    MCHC 33.3 08/16/2022 02:32 PM    RDW 12.6 08/16/2022 02:32 PM    LYMPHOPCT 31.6 08/16/2022 02:32 PM    MONOPCT 15.4 08/16/2022 02:32 PM    BASOPCT 0.1 08/16/2022 02:32 PM    MONOSABS 1.26 08/16/2022 02:32 PM    LYMPHSABS 2.59 08/16/2022 02:32 PM    EOSABS 0.24 08/16/2022 02:32 PM    BASOSABS 0.01 08/16/2022 02:32 PM     Hemoglobin/Hematocrit:    Lab Results   Component Value Date/Time    HGB 13.6 08/18/2022 10:25 AM    HCT 39.8 08/18/2022 10:25 AM     CMP:    Lab Results   Component Value Date/Time     08/18/2022 04:22 AM    K 3.9 08/18/2022 04:22 AM    K 4.5 08/16/2022 02:32 PM     08/18/2022 04:22 AM    CO2 22 08/18/2022 04:22 AM    BUN 18 08/18/2022 04:22 AM    CREATININE 1.2 08/18/2022 04:22 AM    GFRAA >60 08/18/2022 04:22 AM    LABGLOM 58 08/18/2022 04:22 AM    GLUCOSE 100 08/18/2022 04:22 AM    PROT 5.0 08/18/2022 04:22 AM    LABALBU 3.1 08/18/2022 04:22 AM    CALCIUM 8.2 08/18/2022 04:22 AM    BILITOT 0.5 08/18/2022 04:22 AM    ALKPHOS 93 08/18/2022 04:22 AM    AST 12 08/18/2022 04:22 AM    ALT 8 08/18/2022 04:22 AM     BMP:    Lab Results   Component Value Date/Time     08/18/2022 04:22 AM    K 3.9 08/18/2022 04:22 AM    K 4.5 08/16/2022 02:32 PM     08/18/2022 04:22 AM    CO2 22 08/18/2022 04:22 AM    BUN 18 08/18/2022 04:22 AM    LABALBU 3.1 08/18/2022 04:22 AM    CREATININE 1.2 08/18/2022 04:22 AM    CALCIUM 8.2 08/18/2022 04:22 AM    GFRAA >60 08/18/2022 04:22 AM    LABGLOM 58 08/18/2022 04:22 AM    GLUCOSE 100 08/18/2022 04:22 AM     Hepatic Function Panel:    Lab Results   Component Value Date/Time    ALKPHOS 93 08/18/2022 04:22 AM    ALT 8 08/18/2022 04:22 AM    AST 12 08/18/2022 04:22 AM    PROT 5.0 08/18/2022 04:22 AM    BILITOT 0.5 08/18/2022 04:22 AM    BILIDIR <0.2 08/18/2022 04:22 AM    IBILI see below 08/18/2022 04:22 AM    LABALBU 3.1 08/18/2022 04:22 AM     Ionized Calcium:  No results found for: IONCA  Magnesium:    Lab Results   Component Value Date/Time    MG 1.7 08/18/2022 04:22 AM     Phosphorus:    Lab Results   Component Value Date/Time    PHOS 3.4 08/18/2022 04:22 AM     LDH:  No results found for: LDH  Uric Acid:    Lab Results   Component Value Date/Time    LABURIC 7.3 08/17/2022 02:45 AM     PT/INR:  No results found for: PROTIME, INR  Warfarin PT/INR:  No components found for: PTPATWAR, PTINRWAR  PTT:  No results found for: APTT, PTT[APTT}  Troponin:    Lab Results   Component Value Date/Time    TROPONINI <0.01 06/11/2019 02:46 PM     Last 3 Troponin:    Lab Results   Component Value Date/Time    TROPONINI <0.01 06/11/2019 02:46 PM     U/A:    Lab Results   Component Value Date/Time    COLORU Yellow 08/16/2022 03:41 PM    PROTEINU Negative 08/16/2022 03:41 PM    PHUR 5.5 08/16/2022 03:41 PM    WBCUA 0-1 08/16/2022 03:41 PM    RBCUA NONE 08/16/2022 03:41 PM    BACTERIA NONE SEEN 08/16/2022 03:41 PM    CLARITYU Clear 08/16/2022 03:41 PM    SPECGRAV 1.020 08/16/2022 03:41 PM    LEUKOCYTESUR Negative 08/16/2022 03:41 PM    UROBILINOGEN 0.2 08/16/2022 03:41 PM    BILIRUBINUR Negative 08/16/2022 03:41 PM    BLOODU Negative 08/16/2022 03:41 PM    GLUCOSEU Negative 08/16/2022 03:41 PM     HgBA1c:    Lab Results   Component Value Date/Time    LABA1C 5.7 08/16/2022 08:25 PM     FLP:    Lab Results   Component Value Date/Time    TRIG 98 08/17/2022 02:45 AM    HDL 25 08/17/2022 02:45 AM    LDLCALC 37 08/17/2022 02:45 AM    LABVLDL 20 08/17/2022 02:45 AM     TSH:    Lab Results   Component Value Date/Time    TSH 2.550 08/17/2022 02:45 AM     VITAMIN B12: No components found for: B12  FOLATE:    Lab Results   Component Value Date/Time    FOLATE 17.3 08/16/2022 08:25 PM     IRON:    Lab Results   Component Value Date/Time    IRON 76 08/17/2022 02:45 AM     Iron Saturation:  No components found for: PERCENTFE  TIBC:    Lab Results   Component Value Date/Time    TIBC 200 08/17/2022 02:45 AM     FERRITIN:    Lab Results   Component Value Date/Time    FERRITIN 245 08/17/2022 02:45 AM          CBC:  Recent Labs     08/17/22  1235 08/18/22  0105 08/18/22  1025   HGB 13.0 13.1 13.6   HCT 38.8 38.8 39.8          H & H :  Recent Labs     08/17/22  0245 08/17/22  1235 08/18/22  0105 08/18/22  1025   HGB 13.2 13.0 13.1 13.6       TSH:  Recent Labs     08/17/22  0245   TSH 2.550       GLUCOSE:No results for input(s): POCGLU in the last 72 hours. CMP:  Recent Labs     08/17/22  0245 08/18/22  0422    138   K 4.2 3.9    106   CO2 22 22   BUN 29* 18   CREATININE 1.3* 1.2   GFRAA >60 >60   LABGLOM 53 58   GLUCOSE 98 100*   PROT 5.2* 5.0*   LABALBU 3.1* 3.1*   CALCIUM 8.3* 8.2*   BILITOT 1.0 0.5   ALKPHOS 84 93   AST 12 12   ALT 8 8         BNP:No results found for: BNP    PROTIME/INR:No results for input(s): PROTIME, INR in the last 72 hours. CRP: No results for input(s): CRP in the last 72 hours.     ESR:  Recent Labs     08/17/22  0245 08/18/22  0422   SEDRATE 2 7       LIPASE , AMYLASE:  Lab Results   Component Value Date    LIPASE 9 (L) 08/16/2022      No results found for: AMYLASE    ABGs: No results found for: PHART, PO2ART, [id number]    CARDIAC: No results for input(s): CKTOTAL, CKMB, CKMBINDEX, TROPONINI in the last 72 hours. Lipid Profile:   Lab Results   Component Value Date/Time    TRIG 98 08/17/2022 02:45 AM    HDL 25 08/17/2022 02:45 AM    LDLCALC 37 08/17/2022 02:45 AM    CHOL 82 08/17/2022 02:45 AM        CT Head WO Contrast    Addendum Date: 8/16/2022    ADDENDUM: Nonspecific lytic lesions are identified on C6 and C7. Result Date: 8/16/2022  EXAMINATION: CT OF THE HEAD WITHOUT CONTRAST; CT OF THE CERVICAL SPINE WITHOUT CONTRAST 8/16/2022 3:48 pm TECHNIQUE: CT of the head was performed without the administration of intravenous contrast. Automated exposure control, iterative reconstruction, and/or weight based adjustment of the mA/kV was utilized to reduce the radiation dose to as low as reasonably achievable.; CT of the cervical spine was performed without the administration of intravenous contrast. Multiplanar reformatted images are provided for review. Automated exposure control, iterative reconstruction, and/or weight based adjustment of the mA/kV was utilized to reduce the radiation dose to as low as reasonably achievable. COMPARISON: September 5, 2021 HISTORY: ORDERING SYSTEM PROVIDED HISTORY: falls and unsteady TECHNOLOGIST PROVIDED HISTORY: Reason for exam:->falls and unsteady Has a \"code stroke\" or \"stroke alert\" been called? ->No Decision Support Exception - unselect if not a suspected or confirmed emergency medical condition->Emergency Medical Condition (MA) FINDINGS: BRAIN/VENTRICLES: There is no acute intracranial hemorrhage, mass effect or midline shift. No abnormal extra-axial fluid collection. The gray-white differentiation is maintained without evidence of an acute infarct. There is no evidence of hydrocephalus. ORBITS: The visualized portion of the orbits demonstrate no acute abnormality. SINUSES: The visualized paranasal sinuses and mastoid air cells demonstrate no acute abnormality.  SOFT TISSUES/SKULL:  No acute abnormality of the visualized fluid collection. The gray-white differentiation is maintained without evidence of an acute infarct. There is no evidence of hydrocephalus. ORBITS: The visualized portion of the orbits demonstrate no acute abnormality. SINUSES: The visualized paranasal sinuses and mastoid air cells demonstrate no acute abnormality. SOFT TISSUES/SKULL:  No acute abnormality of the visualized skull or soft tissues. No acute intracranial abnormality. There is age-appropriate atrophy, small-vessel ischemic disease, old cortical infarct in the left occipital lobe, and lacunar CVAs in the left basal ganglia and right centrum semiovale. CT cervical spine. There is no acute fracture or dislocation in the cervical spine. The prevertebral soft tissues are normal.  Diffuse degenerative changes are identified from C2-T1 with osteophytes and multilevel disc bulges. There is scarring and apical pleural thickening partially identified. Impression No acute fractures. Diffuse degenerative changes from C2-T1. MRI CERVICAL SPINE WO CONTRAST    Result Date: 8/18/2022  EXAMINATION: MRI OF THE CERVICAL SPINE WITHOUT CONTRAST 8/17/2022 9:32 pm TECHNIQUE: Multiplanar multisequence MRI of the cervical spine was performed without the administration of intravenous contrast. COMPARISON: Noncontrast cervical spine CT dated 08/16/2022. Noncontrast cervical spine MRI dated 09/15/2020. HISTORY: ORDERING SYSTEM PROVIDED HISTORY: Central cervical spinal stenosis TECHNOLOGIST PROVIDED HISTORY: Reason for exam:->Central cervical spinal stenosis FINDINGS: BONES/ALIGNMENT: At the mid to lower aspect of the cervical spine, there is minor reversal of the normal lordosis attributed to degenerative change and stable compared to the prior CT and MRI. Vertebral body heights and marrow signal are normal. SPINAL CORD: No abnormal cord signal is seen. SOFT TISSUES: No paraspinal mass identified. C2-C3: Mild disc space narrowing and posterior disc bulge.   Mild left facet hypertrophy. Mild left foraminal stenosis. C3-C4: Mild disc space narrowing, posterior disc bulge and facet hypertrophy. Mild bilateral foraminal stenosis. C4-C5: Mild disc space narrowing, posterior disc bulge and facet hypertrophy. Moderate left foraminal stenosis. C5-C6: Moderate disc space narrowing with minimal posterior disc bulge. No stenosis. C6-C7: Moderate disc space narrowing with mild posterior disc bulge and facet hypertrophy. Bilateral foraminal stenosis moderate on the right and mild on the left. C7-T1: Marked disc space narrowing and mild posterior disc bulge. Moderate right foraminal stenosis. No central canal stenosis or cord abnormality identified. Mild diffuse cervical spine degenerative changes with foraminal stenosis as detailed above. CT ABDOMEN PELVIS W IV CONTRAST Additional Contrast? None    Result Date: 8/16/2022  EXAMINATION: CT OF THE ABDOMEN AND PELVIS WITH CONTRAST 8/16/2022 3:48 pm TECHNIQUE: CT of the abdomen and pelvis was performed with the administration of intravenous contrast. Multiplanar reformatted images are provided for review. Automated exposure control, iterative reconstruction, and/or weight based adjustment of the mA/kV was utilized to reduce the radiation dose to as low as reasonably achievable. COMPARISON: CT abdomen and pelvis, 09/26/2021. CONTRAST: Insert central 5 cc HISTORY: ORDERING SYSTEM PROVIDED HISTORY: derek khan TECHNOLOGIST PROVIDED HISTORY: Additional Contrast?->None Reason for exam:->ruq abd melena Decision Support Exception - unselect if not a suspected or confirmed emergency medical condition->Emergency Medical Condition (MA) FINDINGS: Lower Chest: Mild dependent atelectasis is seen in the lower lobes. The heart is normal in size. No pleural or pericardial effusion. Calcified pleural and diaphragmatic plaques indicate prior asbestos exposure. Organs: Liver: Small cyst in the right lobe. Otherwise, unremarkable.  Gallbladder: Unremarkable. Pancreas: Mild to moderately atrophied. Otherwise, unremarkable. Spleen:  Unremarkable. Adrenals: Unremarkable. Kidneys: Unremarkable. GI/Bowel: Liquid stool in the colon indicates a diarrheal illness. Apparent mural thickening of the gastric wall may be an artifact of under distension or signify gastritis. Pelvis: The urinary bladder is unremarkable. Brachytherapy seeds are seen in the prostate gland, which is not enlarged. Peritoneum/Retroperitoneum: Moderate atherosclerosis in the abdominal aorta. Aneurysmal dilation of the infrarenal aorta to approximately 3.5 x 3.1 cm no lymphadenopathy. No free air or free fluid is seen. Bones/Soft Tissues: The visualized bones are intact without fracture or focal lesion. 1.  Liquid stool in the colon indicates a diarrheal illness. No bowel wall thickening or obstruction noted. 2. Apparent mural thickening of the stomach may be an artifact of under distension or signify gastritis. 3. Aneurysmal dilation of the infrarenal aorta to 3.5 cm. Per the recommendations of the [employer] of Radiology, imaging follow-up is recommended every 2 years. 4. Calcified pleural and diaphragmatic plaques indicating asbestos exposure. US GALLBLADDER RUQ    Result Date: 8/16/2022  EXAMINATION: RIGHT UPPER QUADRANT ULTRASOUND 8/16/2022 3:22 pm COMPARISON: None. HISTORY: ORDERING SYSTEM PROVIDED HISTORY: ruq pain to palp TECHNOLOGIST PROVIDED HISTORY: Reason for exam:->ruq pain to palp What reading provider will be dictating this exam?->CRC FINDINGS: LIVER:  The liver demonstrates normal echogenicity without evidence of intrahepatic biliary ductal dilatation. BILIARY SYSTEM:  Gallbladder is unremarkable without evidence of pericholecystic fluid, wall thickening or stones. Negative sonographic Yen's sign. Common bile duct is within normal limits measuring 3 mm. RIGHT KIDNEY: The right kidney is grossly unremarkable without evidence of hydronephrosis.  PANCREAS: Not visualized. OTHER: No evidence of right upper quadrant ascites. Unremarkable right upper quadrant ultrasound. MRI BRAIN WO CONTRAST    Result Date: 8/17/2022  EXAMINATION: MRI OF THE BRAIN WITHOUT CONTRAST  8/17/2022 9:32 pm TECHNIQUE: Multiplanar multisequence MRI of the brain was performed without the administration of intravenous contrast. COMPARISON: CT head without contrast, 08/16/2022. HISTORY: ORDERING SYSTEM PROVIDED HISTORY: Frequent falls history of lacunar infarctions TECHNOLOGIST PROVIDED HISTORY: Reason for exam:->Frequent falls history of lacunar infarctions FINDINGS: INTRACRANIAL STRUCTURES/VENTRICLES: There is no acute infarct. There is a chronic infarction in the left occipital lobe. Small chronic lacunar infarction is seen in the left centrum semiovale. Moderate volume loss is seen in the cerebrum with moderate chronic microvascular ischemic changes. No hydrocephalus or extra-axial fluid is seen. ORBITS: Prosthetic lenses are seen in the globes bilaterally. The orbits are otherwise grossly unremarkable. SINUSES: Mild mucosal thickening in the paranasal sinuses. Minimal fluid in the inferior mastoid air cells, which are otherwise grossly clear. BONES/SOFT TISSUES: The bone marrow signal intensity appears normal. The soft tissues demonstrate no acute abnormality. 1.  No acute intracranial abnormality. 2. Chronic infarction in the left occipital lobe. Small chronic lacunar infarction in the right centrum semiovale. 3. Moderate cerebral volume loss with moderate chronic microvascular ischemic changes. RECOMMENDATIONS: Unavailable     US CAROTID ARTERY BILATERAL    Result Date: 8/17/2022  EXAMINATION: ULTRASOUND EVALUATION OF THE CAROTID ARTERIES 8/17/2022 TECHNIQUE: Duplex ultrasound using B-mode/gray scaled imaging, Doppler spectral analysis and color flow Doppler was obtained of the carotid arteries. COMPARISON: None.  HISTORY: ORDERING SYSTEM PROVIDED HISTORY: R carotid stenosis TECHNOLOGIST PROVIDED HISTORY: Reason for exam:->R carotid stenosis What reading provider will be dictating this exam?->CRC FINDINGS: RIGHT: The right common carotid artery demonstrates peak systolic velocities of 63 and 62 cm/sec in the proximal and distal segments respectively. The right internal carotid artery demonstrates the systolic velocities of 844, 207 and 130 cm/sec in the proximal, mid and distal segments respectively. The external carotid artery is patent. The vertebral artery demonstrates normal antegrade flow. Severe proximal internal carotid artery calcified plaque is seen. ICA/CCA ratio of 4.3 LEFT: The left common carotid artery demonstrates peak systolic velocities of 258 and 73 cm/sec in the proximal and distal segments respectively. The left internal carotid artery demonstrates the systolic velocities of 97, 79 and 74 cm/sec in the proximal, mid and distal segments respectively. The external carotid artery is patent. The vertebral artery demonstrates normal antegrade flow. Mild proximal internal carotid artery calcified plaque is noted. ICA/CCA ratio of 1.3     The right internal carotid artery demonstrates 70-99% stenosis. The left internal carotid artery demonstrates 0-50% stenosis. Bilateral vertebral arteries are patent with flow in the normal direction.        Discharge Exam:  See progress note from today    Discharge Medication List as of 8/18/2022  2:38 PM        START taking these medications    Details   sucralfate (CARAFATE) 1 GM tablet Take 1 tablet by mouth 4 times daily, Disp-120 tablet, R-3Normal           CONTINUE these medications which have CHANGED    Details   aspirin 81 MG chewable tablet Take 1 tablet by mouth every morningOTC      pantoprazole (PROTONIX) 40 MG tablet Take 1 tablet by mouth in the morning and at bedtime, Disp-30 tablet, R-3Normal           CONTINUE these medications which have NOT CHANGED    Details   loperamide (IMODIUM) 2 MG capsule Take 2 mg by mouth 4 times daily as needed for DiarrheaHistorical Med      rivastigmine (EXELON) 1.5 MG capsule Take 1 capsule by mouth 2 times daily, Disp-180 capsule, R-3Normal      memantine (NAMENDA) 10 MG tablet TAKE 1 TABLET TWICE A DAY, Disp-180 tablet, R-3Normal      Multiple Vitamins-Minerals (THERAPEUTIC MULTIVITAMIN-MINERALS) tablet Take 1 tablet by mouth every morning Last dose 3-1-21Historical Med      atorvastatin (LIPITOR) 40 MG tablet Take 80 mg by mouth every morningHistorical Med      amLODIPine (NORVASC) 10 MG tablet Take 10 mg by mouth every morning Historical Med      diazepam (VALIUM) 5 MG tablet Take 5 mg by mouth every morning. Historical Med           STOP taking these medications       calcium-vitamin D (OSCAL-500) 500-200 MG-UNIT per tablet Comments:   Reason for Stopping:         vitamin E 400 UNIT capsule Comments:   Reason for Stopping:         vitamin B-12 (CYANOCOBALAMIN) 500 MCG tablet Comments:   Reason for Stopping:               Time Spent on discharge is more than 30 minutes --      TIME  INCLUDES TIME THAT WAS  SPENT WITH DISCHARGE PAPERS, MEDICATION REVIEW, MEDICATION RECONCILIATION,   PRESCRIPTIONS, CHART REVIEW, PATIENT EXAM , FINAL PROGRESS NOTE, DISCUSSION OF FINDINGS WITH PATIENT AND AVAILABLE FAMILY , AND DICTATION  WHERE NEEDED ; Home Health Care [facility]) FORMS COMPLETED ; N-17  COMPLETION ;  H&P UPDATED ; DURABLE EQUIPMENT FORMS.      Active Hospital Problems    Diagnosis     GI bleed [K92.2]      Priority: Medium    RAF (obstructive sleep apnea) [G47.33]      Priority: Medium    Dementia ([facility] [F03.90]      Priority: Medium    Prostate cancer ([address] [C61]      Priority: Medium    Carotid stenosis, right [I65.21]     Stage 3 chronic kidney disease (HCC) [N18.30]     Abdominal aortic aneurysm (AAA) 3.0 cm to 5.5 cm in diameter in male [facility]) [I71.4]     Alzheimer's dementia without behavioral disturbance ([facility] [G30.9, F02.80]     History of stroke [Z86.73]        Signed:    [address],  8/19/2022    2:56 PM    Voice recognition software use for dictation

## 2022-08-19 NOTE — TELEPHONE ENCOUNTER
Vascular:    Sequence of events, reviewed, patient underwent endoscopy, was found to have gastric ulcer, superficial erosions    Patient was discharged today    Discussed with the patient's wife Sherley Cons telephone #[phone] inform her of my personal interpretation of the carotid ultrasound, based upon the velocities and atherosclerotic plaque, on the right side approximately 70% stenosis noted but definitely less than 80%, asymptomatic, mild stenosis of less than 40% noted on the left side    After explaining her risks benefits and alternatives, etc. it is felt, concerning patient's overall condition with the fact he is asymptomatic, close to 70%, it is best followed conservatively with instruction of the patient's wife to call immediately if any strokelike symptoms, clearly explained    As patient does have a history of GI bleeding, due to gastric erosions, trial of aspirin therapy was deferred, patient's wife was instructed, to call me in a month to let me know how he is doing, at that time based upon his condition and based upon his blood counts, consider Plavix therapy rather than aspirin therapy    Patient was recommended follow-up appointment see me in 6 months to monitor carotid artery disease every 6 months abdominal ultrasound abdominal aorta once a year    All the questions were answered

## 2022-09-06 RX ORDER — RIVASTIGMINE TARTRATE 1.5 MG/1
CAPSULE ORAL
Qty: 180 CAPSULE | Refills: 3 | Status: SHIPPED | OUTPATIENT
Start: 2022-09-06

## 2022-09-12 ENCOUNTER — HOSPITAL ENCOUNTER (EMERGENCY)
Age: 82
Discharge: HOME | End: 2022-09-12
Attending: EMERGENCY MEDICINE
Payer: OTHER GOVERNMENT

## 2022-09-12 ENCOUNTER — APPOINTMENT (OUTPATIENT)
Dept: CT IMAGING | Age: 82
End: 2022-09-12
Payer: OTHER GOVERNMENT

## 2022-09-12 VITALS
DIASTOLIC BLOOD PRESSURE: 64 MMHG | WEIGHT: 189 LBS | HEIGHT: 70 IN | OXYGEN SATURATION: 93 % | TEMPERATURE: 97.6 F | RESPIRATION RATE: 14 BRPM | BODY MASS INDEX: 27.06 KG/M2 | SYSTOLIC BLOOD PRESSURE: 117 MMHG | HEART RATE: 87 BPM

## 2022-09-12 DIAGNOSIS — I71.40 AAA (ABDOMINAL AORTIC ANEURYSM) WITHOUT RUPTURE: ICD-10-CM

## 2022-09-12 DIAGNOSIS — R19.7 DIARRHEA, UNSPECIFIED TYPE: Primary | ICD-10-CM

## 2022-09-12 DIAGNOSIS — R10.9 ABDOMINAL PAIN, UNSPECIFIED ABDOMINAL LOCATION: ICD-10-CM

## 2022-09-12 LAB
ALBUMIN SERPL-MCNC: 4 G/DL (ref 3.5–5.2)
ALP BLD-CCNC: 120 U/L (ref 40–129)
ALT SERPL-CCNC: 11 U/L (ref 0–40)
ANION GAP SERPL CALCULATED.3IONS-SCNC: 9 MMOL/L (ref 7–16)
AST SERPL-CCNC: 18 U/L (ref 0–39)
BACTERIA: NORMAL /HPF
BASOPHILS ABSOLUTE: 0.03 E9/L (ref 0–0.2)
BASOPHILS RELATIVE PERCENT: 0.3 % (ref 0–2)
BILIRUB SERPL-MCNC: 0.5 MG/DL (ref 0–1.2)
BILIRUBIN URINE: NEGATIVE
BLOOD, URINE: NEGATIVE
BUN BLDV-MCNC: 22 MG/DL (ref 6–23)
CALCIUM SERPL-MCNC: 8.9 MG/DL (ref 8.6–10.2)
CHLORIDE BLD-SCNC: 105 MMOL/L (ref 98–107)
CLARITY: CLEAR
CO2: 23 MMOL/L (ref 22–29)
COLOR: YELLOW
CREAT SERPL-MCNC: 1.3 MG/DL (ref 0.7–1.2)
EKG ATRIAL RATE: 80 BPM
EKG P AXIS: 38 DEGREES
EKG P-R INTERVAL: 174 MS
EKG Q-T INTERVAL: 402 MS
EKG QRS DURATION: 88 MS
EKG QTC CALCULATION (BAZETT): 463 MS
EKG R AXIS: 13 DEGREES
EKG T AXIS: 30 DEGREES
EKG VENTRICULAR RATE: 80 BPM
EOSINOPHILS ABSOLUTE: 0.28 E9/L (ref 0.05–0.5)
EOSINOPHILS RELATIVE PERCENT: 2.6 % (ref 0–6)
GFR AFRICAN AMERICAN: >60
GFR NON-AFRICAN AMERICAN: 53 ML/MIN/1.73
GLUCOSE BLD-MCNC: 108 MG/DL (ref 74–99)
GLUCOSE URINE: NEGATIVE MG/DL
HCT VFR BLD CALC: 43.1 % (ref 37–54)
HEMOGLOBIN: 14.7 G/DL (ref 12.5–16.5)
IMMATURE GRANULOCYTES #: 0.03 E9/L
IMMATURE GRANULOCYTES %: 0.3 % (ref 0–5)
INR BLD: 1.1
KETONES, URINE: NEGATIVE MG/DL
LACTIC ACID: 1.5 MMOL/L (ref 0.5–2.2)
LEUKOCYTE ESTERASE, URINE: NEGATIVE
LIPASE: 20 U/L (ref 13–60)
LYMPHOCYTES ABSOLUTE: 1.55 E9/L (ref 1.5–4)
LYMPHOCYTES RELATIVE PERCENT: 14.3 % (ref 20–42)
MCH RBC QN AUTO: 32 PG (ref 26–35)
MCHC RBC AUTO-ENTMCNC: 34.1 % (ref 32–34.5)
MCV RBC AUTO: 93.7 FL (ref 80–99.9)
MONOCYTES ABSOLUTE: 0.8 E9/L (ref 0.1–0.95)
MONOCYTES RELATIVE PERCENT: 7.4 % (ref 2–12)
NEUTROPHILS ABSOLUTE: 8.12 E9/L (ref 1.8–7.3)
NEUTROPHILS RELATIVE PERCENT: 75.1 % (ref 43–80)
NITRITE, URINE: NEGATIVE
PDW BLD-RTO: 12.3 FL (ref 11.5–15)
PH UA: 6 (ref 5–9)
PLATELET # BLD: 183 E9/L (ref 130–450)
PMV BLD AUTO: 9.7 FL (ref 7–12)
POTASSIUM REFLEX MAGNESIUM: 4.2 MMOL/L (ref 3.5–5)
PROTEIN UA: NEGATIVE MG/DL
PROTHROMBIN TIME: 12.1 SEC (ref 9.3–12.4)
RBC # BLD: 4.6 E12/L (ref 3.8–5.8)
RBC UA: NORMAL /HPF (ref 0–2)
SARS-COV-2, NAAT: NOT DETECTED
SODIUM BLD-SCNC: 137 MMOL/L (ref 132–146)
SPECIFIC GRAVITY UA: 1.01 (ref 1–1.03)
TOTAL PROTEIN: 6.3 G/DL (ref 6.4–8.3)
TROPONIN, HIGH SENSITIVITY: 41 NG/L (ref 0–11)
TROPONIN, HIGH SENSITIVITY: 42 NG/L (ref 0–11)
UROBILINOGEN, URINE: 0.2 E.U./DL
WBC # BLD: 10.8 E9/L (ref 4.5–11.5)
WBC UA: NORMAL /HPF (ref 0–5)

## 2022-09-12 PROCEDURE — 85025 COMPLETE CBC W/AUTO DIFF WBC: CPT

## 2022-09-12 PROCEDURE — 81001 URINALYSIS AUTO W/SCOPE: CPT

## 2022-09-12 PROCEDURE — 85610 PROTHROMBIN TIME: CPT

## 2022-09-12 PROCEDURE — 84484 ASSAY OF TROPONIN QUANT: CPT

## 2022-09-12 PROCEDURE — 93005 ELECTROCARDIOGRAM TRACING: CPT | Performed by: EMERGENCY MEDICINE

## 2022-09-12 PROCEDURE — 80053 COMPREHEN METABOLIC PANEL: CPT

## 2022-09-12 PROCEDURE — 6360000002 HC RX W HCPCS: Performed by: EMERGENCY MEDICINE

## 2022-09-12 PROCEDURE — 83690 ASSAY OF LIPASE: CPT

## 2022-09-12 PROCEDURE — 87635 SARS-COV-2 COVID-19 AMP PRB: CPT

## 2022-09-12 PROCEDURE — 2580000003 HC RX 258: Performed by: EMERGENCY MEDICINE

## 2022-09-12 PROCEDURE — 74177 CT ABD & PELVIS W/CONTRAST: CPT

## 2022-09-12 PROCEDURE — 99285 EMERGENCY DEPT VISIT HI MDM: CPT

## 2022-09-12 PROCEDURE — 83605 ASSAY OF LACTIC ACID: CPT

## 2022-09-12 PROCEDURE — 96374 THER/PROPH/DIAG INJ IV PUSH: CPT

## 2022-09-12 PROCEDURE — 6360000004 HC RX CONTRAST MEDICATION: Performed by: RADIOLOGY

## 2022-09-12 RX ORDER — ONDANSETRON 2 MG/ML
4 INJECTION INTRAMUSCULAR; INTRAVENOUS ONCE
Status: COMPLETED | OUTPATIENT
Start: 2022-09-12 | End: 2022-09-12

## 2022-09-12 RX ORDER — 0.9 % SODIUM CHLORIDE 0.9 %
500 INTRAVENOUS SOLUTION INTRAVENOUS ONCE
Status: COMPLETED | OUTPATIENT
Start: 2022-09-12 | End: 2022-09-12

## 2022-09-12 RX ORDER — DICYCLOMINE HYDROCHLORIDE 10 MG/1
10 CAPSULE ORAL 4 TIMES DAILY
Qty: 120 CAPSULE | Refills: 0 | Status: SHIPPED | OUTPATIENT
Start: 2022-09-12

## 2022-09-12 RX ORDER — ONDANSETRON 4 MG/1
4 TABLET, ORALLY DISINTEGRATING ORAL 3 TIMES DAILY PRN
Qty: 21 TABLET | Refills: 0 | Status: SHIPPED | OUTPATIENT
Start: 2022-09-12

## 2022-09-12 RX ADMIN — IOPAMIDOL 75 ML: 755 INJECTION, SOLUTION INTRAVENOUS at 14:52

## 2022-09-12 RX ADMIN — SODIUM CHLORIDE 500 ML: 9 INJECTION, SOLUTION INTRAVENOUS at 13:19

## 2022-09-12 RX ADMIN — ONDANSETRON 4 MG: 2 INJECTION INTRAMUSCULAR; INTRAVENOUS at 13:19

## 2022-09-12 ASSESSMENT — ENCOUNTER SYMPTOMS
BACK PAIN: 0
ABDOMINAL PAIN: 1
COUGH: 0
SHORTNESS OF BREATH: 0
DIARRHEA: 1

## 2022-09-12 NOTE — ED NOTES
Pt given pants and assisted to wheelchair. Dc instructions reviewed with pt and wife. Wife upset and states she does not agree with discharge. offerred to get MD to talk with pt, but wife stated she already voiced her concerns to MD.  Wife states \"we will be back\". To car via [facility] with wife.      Yoli Paris, [facility] [place]  09/12/22 3023

## 2022-09-12 NOTE — ED PROVIDER NOTES
This is an 25-year-old male with a past medical history of dementia as well as prostate cancer and a small bowel obstruction who presents to the ED for evaluation of diarrhea. Patient states that he woke up today has had multiple episodes of watery diarrhea. Patient states also having some off and on abdominal cramping with these bowel movements. Patient has no fevers chills states he does feel somewhat nauseous. Patient states that he has no suspicious food intake or ill contacts no recent use of antibiotics. No reported chest pain or shortness of breath. No other reported mitigating or exacerbate factors. The history is provided by the patient. Review of Systems   Constitutional:  Positive for appetite change. Negative for fever. HENT:  Negative for congestion. Eyes:  Negative for visual disturbance. Respiratory:  Negative for cough and shortness of breath. Cardiovascular:  Negative for chest pain. Gastrointestinal:  Positive for abdominal pain and diarrhea. Endocrine: Negative for polyuria. Genitourinary:  Negative for dysuria. Musculoskeletal:  Negative for back pain. Skin:  Negative for rash. Allergic/Immunologic: Negative for immunocompromised state. Neurological:  Negative for headaches. Hematological:  Does not bruise/bleed easily. Psychiatric/Behavioral:  Negative for confusion. Physical Exam  Vitals and nursing note reviewed. Constitutional:       General: He is not in acute distress. Appearance: He is well-developed. HENT:      Head: Normocephalic and atraumatic. Mouth/Throat:      Mouth: Mucous membranes are dry. Eyes:      Extraocular Movements: Extraocular movements intact. Pupils: Pupils are equal, round, and reactive to light. Neck:      Vascular: No JVD. Cardiovascular:      Rate and Rhythm: Normal rate and regular rhythm. Pulmonary:      Effort: Pulmonary effort is normal.      Breath sounds: No wheezing, rhonchi or rales. Chest:      Chest wall: No tenderness. Abdominal:      General: There is no distension. Palpations: Abdomen is soft. Tenderness: There is no abdominal tenderness. There is no guarding or rebound. Hernia: No hernia is present. Musculoskeletal:      Cervical back: Normal range of motion and neck supple. Right lower leg: No edema. Left lower leg: No edema. Skin:     General: Skin is warm and dry. Capillary Refill: Capillary refill takes less than 2 seconds. Neurological:      General: No focal deficit present. Mental Status: He is alert and oriented to person, place, and time. Cranial Nerves: No cranial nerve deficit. Psychiatric:         Mood and Affect: Mood normal.         Behavior: Behavior normal.        Procedures     MDM  Number of Diagnoses or Management Options  AAA (abdominal aortic aneurysm) without rupture (HCC)  Abdominal pain, unspecified abdominal location  Diarrhea, unspecified type  Diagnosis management comments: Patient is an extremely pleasant 79year old male who presented to the ED for evaluation of diarrhea, nausea and abdominal cramping. Patient was nontoxic and in no distress. Patient did feel better after IV fluids and antiemetics. CT showed findings most diarrheal illness without any other signs of acute pathology. Low suspicion for c diff. Patient had reassuring blood work and recheck of his abdomen was soft and nontender. Patient was given Zofran and Bentyl for home with return precautions.  Discussed with wife at bedside who was agreeable with plan                     --------------------------------------------- PAST HISTORY ---------------------------------------------  Past Medical History:  has a past medical history of Abdominal aortic aneurysm (AAA) 3.0 cm to 5.5 cm in diameter in [facility]), Acute respiratory failure with hypoxia ([facility] Alzheimer's dementia without behavioral disturbance ([facility] Aspiration pneumonia ([facility] Balance problem, Carpal tunnel syndrome of left wrist, Cervical radiculopathy, COVID-19, CVA (cerebral vascular accident) ([facility] DDD (degenerative disc disease), cervical, DDD (degenerative disc disease), lumbar, Dementia ([address] Depression, Heart murmur, Hematemesis, History of stroke, L-S radiculopathy, Lactic acidosis, Left arm numbness, Lumbosacral spondylosis without myelopathy, Neuropathy, RAF (obstructive sleep apnea), Osteoarthritis, Peripheral polyneuropathy, Persistent postural-perceptual dizziness, Pneumonia, Prostate cancer ([address] SBO (small bowel obstruction) ([address] Sepsis ([facility] Sleep apnea, Snoring, Spinal stenosis of lumbar region, and Stage 3 chronic kidney disease ([address] Past Surgical History:  has a past surgical history that includes Prostate surgery; hernia repair; Cataract removal; Upper gastrointestinal endoscopy (N/A, 6/15/2019); Pain management procedure (Bilateral, 3/8/2021); and Upper gastrointestinal endoscopy (N/A, 8/17/2022). Social History:  reports that he quit smoking about 35 years ago. His smoking use included cigarettes. He started smoking about 63 years ago. He has a 34.00 pack-year smoking history. He has never used smokeless tobacco. He reports that he does not currently use alcohol. He reports that he does not use drugs. Family History: family history includes Crohn's Disease in his sister; Heart Disease in his father. The patients home medications have been reviewed.     Allergies: Oxycodone-acetaminophen and Vicodin [hydrocodone-acetaminophen]    -------------------------------------------------- RESULTS -------------------------------------------------  Labs:  Results for orders placed or performed during the hospital encounter of 09/12/22   COVID-19, Rapid    Specimen: Nasopharyngeal Swab   Result Value Ref Range    SARS-CoV-2, NAAT Not Detected Not Detected   Comprehensive Metabolic Panel w/ Reflex to MG   Result Value Ref Range    Sodium 137 132 - 146 mmol/L Potassium reflex Magnesium 4.2 3.5 - 5.0 mmol/L    Chloride 105 98 - 107 mmol/L    CO2 23 22 - 29 mmol/L    Anion Gap 9 7 - 16 mmol/L    Glucose 108 (H) 74 - 99 mg/dL    BUN 22 6 - 23 mg/dL    Creatinine 1.3 (H) 0.7 - 1.2 mg/dL    GFR Non-African American 53 >=60 mL/min/1.73    GFR African American >60     Calcium 8.9 8.6 - 10.2 mg/dL    Total Protein 6.3 (L) 6.4 - 8.3 g/dL    Albumin 4.0 3.5 - 5.2 g/dL    Total Bilirubin 0.5 0.0 - 1.2 mg/dL    Alkaline Phosphatase 120 40 - 129 U/L    ALT 11 0 - 40 U/L    AST 18 0 - 39 U/L   CBC with Auto Differential   Result Value Ref Range    WBC 10.8 4.5 - 11.5 E9/L    RBC 4.60 3.80 - 5.80 E12/L    Hemoglobin 14.7 12.5 - 16.5 g/dL    Hematocrit 43.1 37.0 - 54.0 %    MCV 93.7 80.0 - 99.9 fL    MCH 32.0 26.0 - 35.0 pg    MCHC 34.1 32.0 - 34.5 %    RDW 12.3 11.5 - 15.0 fL    Platelets 975 488 - 786 E9/L    MPV 9.7 7.0 - 12.0 fL    Neutrophils % 75.1 43.0 - 80.0 %    Immature Granulocytes % 0.3 0.0 - 5.0 %    Lymphocytes % 14.3 (L) 20.0 - 42.0 %    Monocytes % 7.4 2.0 - 12.0 %    Eosinophils % 2.6 0.0 - 6.0 %    Basophils % 0.3 0.0 - 2.0 %    Neutrophils Absolute 8.12 (H) 1.80 - 7.30 E9/L    Immature Granulocytes # 0.03 E9/L    Lymphocytes Absolute 1.55 1.50 - 4.00 E9/L    Monocytes Absolute 0.80 0.10 - 0.95 E9/L    Eosinophils Absolute 0.28 0.05 - 0.50 E9/L    Basophils Absolute 0.03 0.00 - 0.20 E9/L   Troponin   Result Value Ref Range    Troponin, High Sensitivity 42 (H) 0 - 11 ng/L   Lipase   Result Value Ref Range    Lipase 20 13 - 60 U/L   Lactic Acid   Result Value Ref Range    Lactic Acid 1.5 0.5 - 2.2 mmol/L   Urinalysis with Microscopic   Result Value Ref Range    Color, UA Yellow Straw/Yellow    Clarity, UA Clear Clear    Glucose, Ur Negative Negative mg/dL    Bilirubin Urine Negative Negative    Ketones, Urine Negative Negative mg/dL    Specific Gravity, UA 1.010 1.005 - 1.030    Blood, Urine Negative Negative    pH, UA 6.0 5.0 - 9.0    Protein, UA Negative Negative mg/dL Urobilinogen, Urine 0.2 <2.0 E.U./dL    Nitrite, Urine Negative Negative    Leukocyte Esterase, Urine Negative Negative    WBC, UA NONE 0 - 5 /HPF    RBC, UA NONE 0 - 2 /HPF    Bacteria, UA NONE SEEN None Seen /HPF   Protime-INR   Result Value Ref Range    Protime 12.1 9.3 - 12.4 sec    INR 1.1    EKG 12 Lead   Result Value Ref Range    Ventricular Rate 80 BPM    Atrial Rate 80 BPM    P-R Interval 174 ms    QRS Duration 88 ms    Q-T Interval 402 ms    QTc Calculation (Bazett) 463 ms    P Axis 38 degrees    R Axis 13 degrees    T Axis 30 degrees       Radiology:  CT ABDOMEN PELVIS W IV CONTRAST Additional Contrast? None   Final Result   1. Liquid stool in the colon indicates a diarrheal illness. No bowel wall   thickening or obstruction noted. 2. Stable aneurysmal dilation of the infrarenal aorta to 3.5 cm. Per the   recommendations of the [employer] of Rad[employer], imaging follow-up is   recommended every 2 years. 3. Mild fluid distension of the distal esophagus likely due to reflux. 4. Evidence of prior asbestos exposure.             ------------------------- NURSING NOTES AND VITALS REVIEWED ---------------------------  Date / Time Roomed:  9/12/2022 12:51 PM  ED Bed Assignment:  16/16    The nursing notes within the ED encounter and vital signs as below have been reviewed. BP (!) 105/55   Pulse 87   Temp 97.6 °F (36.4 °C) (Oral)   Resp 14   Ht 5' 10\" (1.778 m)   Wt 189 lb (85.7 kg)   SpO2 93%   BMI 27.12 kg/m²   Oxygen Saturation Interpretation: Normal      ------------------------------------------ PROGRESS NOTES ------------------------------------------  3:35 PM EDT  I have spoken with the patient and discussed todays results, in addition to providing specific details for the plan of care and counseling regarding the diagnosis and prognosis. Their questions are answered at this time and they are agreeable with the plan.  I discussed at length with them reasons for immediate return

## 2022-09-12 NOTE — ED NOTES
Pt. Has had no bm while in ed, describes abd cramping on and off.      Guy Olivarez RN  09/12/22 0840

## 2023-01-13 ENCOUNTER — TELEPHONE (OUTPATIENT)
Dept: VASCULAR SURGERY | Age: 83
End: 2023-01-13

## 2023-01-13 DIAGNOSIS — I65.21 CAROTID STENOSIS, RIGHT: Primary | ICD-10-CM

## 2023-02-20 ENCOUNTER — HOSPITAL ENCOUNTER (OUTPATIENT)
Dept: ULTRASOUND IMAGING | Age: 83
Discharge: HOME | End: 2023-02-22
Payer: OTHER GOVERNMENT

## 2023-02-20 DIAGNOSIS — I65.21 CAROTID STENOSIS, RIGHT: ICD-10-CM

## 2023-02-20 PROCEDURE — 93880 EXTRACRANIAL BILAT STUDY: CPT

## 2023-02-22 ENCOUNTER — OFFICE VISIT (OUTPATIENT)
Dept: VASCULAR SURGERY | Age: 83
End: 2023-02-22
Payer: MEDICARE

## 2023-02-22 VITALS — WEIGHT: 192 LBS | BODY MASS INDEX: 27.49 KG/M2 | HEIGHT: 70 IN

## 2023-02-22 DIAGNOSIS — I65.21 CAROTID STENOSIS, RIGHT: ICD-10-CM

## 2023-02-22 DIAGNOSIS — I71.40 ABDOMINAL AORTIC ANEURYSM (AAA) 3.0 CM TO 5.5 CM IN DIAMETER IN MALE: Primary | ICD-10-CM

## 2023-02-22 PROCEDURE — 99214 OFFICE O/P EST MOD 30 MIN: CPT | Performed by: SURGERY

## 2023-02-22 PROCEDURE — G8427 DOCREV CUR MEDS BY ELIG CLIN: HCPCS | Performed by: SURGERY

## 2023-02-22 PROCEDURE — 1123F ACP DISCUSS/DSCN MKR DOCD: CPT | Performed by: SURGERY

## 2023-02-22 PROCEDURE — G8484 FLU IMMUNIZE NO ADMIN: HCPCS | Performed by: SURGERY

## 2023-02-22 PROCEDURE — 1036F TOBACCO NON-USER: CPT | Performed by: SURGERY

## 2023-02-22 PROCEDURE — G8417 CALC BMI ABV UP PARAM F/U: HCPCS | Performed by: SURGERY

## 2023-02-22 NOTE — PROGRESS NOTES
Chief Complaint:   Chief Complaint   Patient presents with    Circulatory Problem     Carotid us         HPI: Patient came to the office with his wife, for the evaluation of multiple vascular issues including carotid artery disease as well as abdominal aortic aneurysm    Patient underwent a carotid ultrasound yesterday at the hospital, wanted me to review it and make recommendations    Patient denies abdominal pain or back pain      Patient denies any focal lateralizing neurological symptoms like loss of speech, vision or loss of function of extremity    Patient can walk a few blocks , and denies any symptoms of rest pain    Allergies   Allergen Reactions    Oxycodone-Acetaminophen Nausea And Vomiting     Other reaction(s): GI Upset    Vicodin [Hydrocodone-Acetaminophen] Nausea And Vomiting       Current Outpatient Medications   Medication Sig Dispense Refill    dicyclomine (BENTYL) 10 MG capsule Take 1 capsule by mouth 4 times daily 120 capsule 0    rivastigmine (EXELON) 1.5 MG capsule TAKE 1 CAPSULE BY MOUTH TWICE DAILY 180 capsule 3    pantoprazole (PROTONIX) 40 MG tablet Take 1 tablet by mouth in the morning and at bedtime 30 tablet 3    loperamide (IMODIUM) 2 MG capsule Take 2 mg by mouth 4 times daily as needed for Diarrhea      memantine (NAMENDA) 10 MG tablet TAKE 1 TABLET TWICE A  tablet 3    atorvastatin (LIPITOR) 40 MG tablet Take 80 mg by mouth every morning      amLODIPine (NORVASC) 10 MG tablet Take 10 mg by mouth every morning       diazepam (VALIUM) 5 MG tablet Take 5 mg by mouth every morning.        ondansetron (ZOFRAN-ODT) 4 MG disintegrating tablet Take 1 tablet by mouth 3 times daily as needed for Nausea or Vomiting (Patient not taking: Reported on 2/22/2023) 21 tablet 0    sucralfate (CARAFATE) 1 GM tablet Take 1 tablet by mouth 4 times daily 120 tablet 3    aspirin 81 MG chewable tablet Take 1 tablet by mouth every morning (Patient not taking: Reported on 2/22/2023)      Multiple Vitamins-Minerals (THERAPEUTIC MULTIVITAMIN-MINERALS) tablet Take 1 tablet by mouth every morning Last dose 3-1-21       No current facility-administered medications for this visit.        Past Medical History:   Diagnosis Date    Abdominal aortic aneurysm (AAA) 3.0 cm to 5.5 cm in diameter in male 06/14/2019    wife unaware    Acute respiratory failure with hypoxia ([address] 6/14/2019    Alzheimer's dementia without behavioral disturbance ([address] 9/14/2017    Aspiration pneumonia ([address] 6/14/2019    Balance problem 4/4/2022    Carpal tunnel syndrome of left wrist 2/19/2021    Cervical radiculopathy 2/19/2021    COVID-19 12/2020    CVA (cerebral vascular accident) ([address]     x 3    DDD (degenerative disc disease), cervical 6/21/2021    DDD (degenerative disc disease), lumbar 3/1/2021    Dementia ([address]     Depression     Heart murmur     Hematemesis     history of    History of stroke 9/14/2017    L-S radiculopathy 2/19/2021    Lactic acidosis 6/11/2019    Left arm numbness 6/24/2020    Lumbosacral spondylosis without myelopathy 3/1/2021    Neuropathy 9/14/2017    RAF (obstructive sleep apnea)     wears CPAP    Osteoarthritis     Peripheral polyneuropathy     Persistent postural-perceptual dizziness 9/24/2021    Pneumonia 6/11/2019    Prostate cancer ([address] 2007    SBO (small bowel obstruction) ([address] 6/11/2019    Sepsis ([address] 6/14/2019    Present on admission    Sleep apnea 10/22/2019    Snoring 10/22/2019    Spinal stenosis of lumbar region 3/1/2021    Stage 3 chronic kidney disease ([address] 6/14/2019       Past Surgical History:   Procedure Laterality Date    CATARACT REMOVAL      HERNIA REPAIR      PAIN MANAGEMENT PROCEDURE Bilateral 3/8/2021    # 1 LUMBAR TRANSFORAMINAL EPIDURAL STEROID INJECTION bilateral L4 UNDER FLUOROSCOPIC GUIDANCE performed by Hao Newton MD at [address]      beacons/radiation    UPPER GASTROINTESTINAL ENDOSCOPY N/A 6/15/2019    EGD ESOPHAGOGASTRODUODENOS[facility] N/A 2022    EGD BIOPSY performed by Salbador King MD at [facility] ENDOSCOPY       Family History   Problem Relation Age of Onset    Heart Disease Father     Crohn's Disease Sister        Social History     Socioeconomic History    Marital status:      Spouse name: Not on file    Number of children: Not on file    Years of education: Not on file    Highest education level: Not on file   Occupational History    Occupation: [employer]-retired   Tobacco Use    Smoking status: Former     Packs/day: 1.00     Years: 34.00     Pack years: 34.00     Types: Cigarettes     Start date: 1959     Quit date: 1987     Years since quittin.8    Smokeless tobacco: Never   Vaping Use    Vaping Use: Never used   Substance and Sexual Activity    Alcohol use: Not Currently     Comment: 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 Respiratory:  No cough, pleuritic chest pain, dyspnea, or wheezing. Obstructive sleep apnea    Cardiovascular: No angina, palpitations . Musculoskeletal:  No arthritis or weakness. Cervical radiculopathy    Neurologic:  No paralysis, paresis, paresthesia, seizures or headache. History of early dementia and Alzheimer disease      Endocrinology:       Urology: History of carcinoma the prostate    Gastrointestinal: GERD    Physical Exam:  General appearance:  Alert, awake, oriented x 3. No distress.   Eyes:  Conjunctivae appear normal; PERRL  Neck:  No jugular venous distention, lymphadenopathy or thyromegaly. Right carotid bruit noted  Lungs:  Clear to ausculation bilaterally. No rhonchi, crackles, wheezes  Heart:  Regular rate and rhythm. No rub or murmur  Abdomen:  Soft, non-tender. No masses, organomegaly. On deep palpation slightly prominent pulse noted nontender  Musculoskeletal : No joint effusions, tenderness swelling    Neuro: Speech is intact. Moving all extremities. No focal motor or sensory deficits      Extremities:  Both feet are warm to touch. The color of both feet is normal.        Pulses Right  Left    Brachial 3 3    Radial    3=normal   Femoral 2 2  2=diminished   Popliteal    1=barely palpable   Dorsalis pedis    0=absent   Posterior tibial 2 2  4=aneurysmal             Other pertinent information:1. The past medical records were reviewed. Assessment:    1. Carotid stenosis, right        2. Abdominal aortic aneurysm      Plan:       Discussed the patient and his wife regarding my personal interpretation of carotid ultrasound, almost 70% based upon the atherosclerotic plaque, based upon the velocities between 50 to 60%, asymptomatic, reassured, office in 6 months but call and come to the hospital any strokelike symptoms, also call and come to the hospital abdominal pain or back pain, recommend ultrasound abdominal at every 2 years              Patient was instructed to continue walking program and to call if any worsening of symptoms and to call if any focal lateralizing neurological symptoms like loss of speech, vision or loss of function of extremity. All the questions were answered. Indicated follow-up: Return if symptoms worsen or fail to improve.

## 2023-03-27 ENCOUNTER — OFFICE VISIT (OUTPATIENT)
Dept: NEUROLOGY | Age: 83
End: 2023-03-27
Payer: MEDICARE

## 2023-03-27 VITALS
HEIGHT: 70 IN | SYSTOLIC BLOOD PRESSURE: 120 MMHG | BODY MASS INDEX: 27.49 KG/M2 | WEIGHT: 192 LBS | DIASTOLIC BLOOD PRESSURE: 60 MMHG

## 2023-03-27 DIAGNOSIS — R26.89 MULTIFACTORIAL GAIT DISORDER: Primary | ICD-10-CM

## 2023-03-27 DIAGNOSIS — G30.9 MIXED ALZHEIMER'S AND VASCULAR DEMENTIA (HCC): ICD-10-CM

## 2023-03-27 DIAGNOSIS — M48.00 SPINAL STENOSIS, UNSPECIFIED SPINAL REGION: ICD-10-CM

## 2023-03-27 DIAGNOSIS — I63.9 OCCIPITAL STROKE (HCC): ICD-10-CM

## 2023-03-27 DIAGNOSIS — F02.80 MIXED ALZHEIMER'S AND VASCULAR DEMENTIA (HCC): ICD-10-CM

## 2023-03-27 DIAGNOSIS — F01.50 MIXED ALZHEIMER'S AND VASCULAR DEMENTIA (HCC): ICD-10-CM

## 2023-03-27 PROBLEM — K92.2 GI BLEED: Status: RESOLVED | Noted: 2022-08-16 | Resolved: 2023-03-27

## 2023-03-27 PROBLEM — F03.90 DEMENTIA (HCC): Status: RESOLVED | Noted: 2022-08-16 | Resolved: 2023-03-27

## 2023-03-27 PROBLEM — R42 PERSISTENT POSTURAL-PERCEPTUAL DIZZINESS: Status: RESOLVED | Noted: 2021-09-24 | Resolved: 2023-03-27

## 2023-03-27 PROBLEM — H81.8X9 PERSISTENT POSTURAL-PERCEPTUAL DIZZINESS: Status: RESOLVED | Noted: 2021-09-24 | Resolved: 2023-03-27

## 2023-03-27 PROBLEM — M47.817 LUMBOSACRAL SPONDYLOSIS WITHOUT MYELOPATHY: Status: RESOLVED | Noted: 2021-03-01 | Resolved: 2023-03-27

## 2023-03-27 PROBLEM — Z86.73 HISTORY OF STROKE: Status: RESOLVED | Noted: 2017-09-14 | Resolved: 2023-03-27

## 2023-03-27 PROCEDURE — G8417 CALC BMI ABV UP PARAM F/U: HCPCS | Performed by: NURSE PRACTITIONER

## 2023-03-27 PROCEDURE — 1036F TOBACCO NON-USER: CPT | Performed by: NURSE PRACTITIONER

## 2023-03-27 PROCEDURE — G8484 FLU IMMUNIZE NO ADMIN: HCPCS | Performed by: NURSE PRACTITIONER

## 2023-03-27 PROCEDURE — 99214 OFFICE O/P EST MOD 30 MIN: CPT | Performed by: NURSE PRACTITIONER

## 2023-03-27 PROCEDURE — 1123F ACP DISCUSS/DSCN MKR DOCD: CPT | Performed by: NURSE PRACTITIONER

## 2023-03-27 PROCEDURE — G8427 DOCREV CUR MEDS BY ELIG CLIN: HCPCS | Performed by: NURSE PRACTITIONER

## 2023-03-27 RX ORDER — ASPIRIN 81 MG/1
81 TABLET ORAL DAILY
COMMUNITY

## 2023-03-27 NOTE — PROGRESS NOTES
semiovale. 3. Moderate cerebral volume loss with moderate chronic microvascular ischemic changes. MRI cervical spine Aug 2022: No central canal stenosis or cord abnormality identified. Mild diffuse cervical spine degenerative changes with foraminal stenosis as detailed above. Ultrasound of carotids February 2023: The right internal carotid artery demonstrates greater than 70% stenosis, but not significantly changed since the prior study. .   The left internal carotid artery demonstrates 0-50% stenosis. Bilateral vertebral arteries are patent with flow in the normal direction. Follow-up in 6-9 months is recommended     Labs and images personally reviewed today    Assessment:     Multifactorial gait disorder: Secondary to cervical and lumbar stenosis, history of strokes, cerebral dysfunction from underlying dementia. 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 Spinal stenosis: Affecting lumbar and cervical region with crowding of cauda equina on last lumbar MRI. Most recent MRI of the cervical spine appears to be stable as well. Declining further neurosurgery evaluation. Dizziness: Multifactorial.  I am concerned about what medications he is taking or not taking based on the discussion today with his wife and daughter, as there appears to be a great amount of confusion. Again, he is describing positional vertigo rather balance problems which may be related to his lumbosacral disease and underlying dementia. Extensive work-up for vascular events was unrevealing. Chronic left occipital stroke: Evidence of an old right lacunar infarct as well on MRI. Intolerant to statin but LDL is at goal and he is maintained on aspirin therapy.      Carotid stenosis: 70% on R and stable--following with vascular    Plan:     -Continue Exelon 1.5 mg BID and Namenda 10 mg BID  -Intolerant to statins (LDL is at goal)  -On

## 2023-08-11 DIAGNOSIS — I65.21 CAROTID STENOSIS, RIGHT: Primary | ICD-10-CM

## 2023-09-13 ENCOUNTER — HOSPITAL ENCOUNTER (OUTPATIENT)
Dept: CARDIOLOGY | Age: 83
Discharge: HOME | End: 2023-09-13
Attending: SURGERY
Payer: MEDICARE

## 2023-09-13 ENCOUNTER — OFFICE VISIT (OUTPATIENT)
Dept: VASCULAR SURGERY | Age: 83
End: 2023-09-13
Payer: MEDICARE

## 2023-09-13 VITALS — BODY MASS INDEX: 25.77 KG/M2 | HEIGHT: 70 IN | WEIGHT: 180 LBS

## 2023-09-13 DIAGNOSIS — G30.9 MIXED ALZHEIMER'S AND VASCULAR DEMENTIA (HCC): ICD-10-CM

## 2023-09-13 DIAGNOSIS — I65.21 CAROTID STENOSIS, RIGHT: ICD-10-CM

## 2023-09-13 DIAGNOSIS — I65.21 CAROTID STENOSIS, RIGHT: Primary | ICD-10-CM

## 2023-09-13 DIAGNOSIS — I71.40 ABDOMINAL AORTIC ANEURYSM (AAA) 3.0 CM TO 5.5 CM IN DIAMETER IN MALE (HCC): ICD-10-CM

## 2023-09-13 DIAGNOSIS — F02.80 MIXED ALZHEIMER'S AND VASCULAR DEMENTIA (HCC): ICD-10-CM

## 2023-09-13 DIAGNOSIS — F01.50 MIXED ALZHEIMER'S AND VASCULAR DEMENTIA (HCC): ICD-10-CM

## 2023-09-13 PROCEDURE — 1123F ACP DISCUSS/DSCN MKR DOCD: CPT | Performed by: SURGERY

## 2023-09-13 PROCEDURE — G8417 CALC BMI ABV UP PARAM F/U: HCPCS | Performed by: SURGERY

## 2023-09-13 PROCEDURE — 93880 EXTRACRANIAL BILAT STUDY: CPT

## 2023-09-13 PROCEDURE — G8427 DOCREV CUR MEDS BY ELIG CLIN: HCPCS | Performed by: SURGERY

## 2023-09-13 PROCEDURE — 99213 OFFICE O/P EST LOW 20 MIN: CPT | Performed by: SURGERY

## 2023-09-13 PROCEDURE — 1036F TOBACCO NON-USER: CPT | Performed by: SURGERY

## 2023-09-13 NOTE — PROGRESS NOTES
[facility] Heart [facility] V[facility] - [facility]    This is a pre read worksheet - prior to official physician interpretation    Maria T Myrick  1940  Date of study: 9/13/23    Indication for study:  Carotid artery stenosis  Study : Bilateral Carotid Artery Duplex Examination    Duplex examination of the RIGHT carotid artery system identifies atherosclerotic plaque. The peak systolic velocity in internal carotid artery was 222 centimeters / second. The maximum end diastolic velocity was 61 centimeters / second. The ICA/CCA ratio is 3.1. The right vertebral artery has antegrade flow. Duplex examination of the LEFT carotid artery system identifies atherosclerotic plaque. The peak systolic velocity in internal carotid artery was 62 centimeters / second. The maximum end diastolic velocity was 16 centimeters / second. The ICA/CCA ratio is 0.8. The left vertebral artery has antegrade flow.     RIGHT 70%  LEFT 20%        LAST STUDY  2/21/2023 @ [facility]  Rt 70  Lt 0-50

## 2023-09-13 NOTE — PROGRESS NOTES
4/4/2022    Carpal tunnel syndrome of left wrist 2/19/2021    Cervical radiculopathy 2/19/2021    COVID-19 12/2020    CVA (cerebral vascular accident) ([address])     x 3    DDD (degenerative disc disease), cervical 6/21/2021    DDD (degenerative disc disease), lumbar 3/1/2021    Dementia ([address])     Depression     Heart murmur     Hematemesis     history of    History of stroke 9/14/2017    L-S radiculopathy 2/19/2021    Lactic acidosis 6/11/2019    Left arm numbness 6/24/2020    Lumbosacral spondylosis without myelopathy 3/1/2021    Neuropathy 9/14/2017    RAF (obstructive sleep apnea)     wears CPAP    Osteoarthritis     Peripheral polyneuropathy     Persistent postural-perceptual dizziness 9/24/2021    Pneumonia 6/11/2019    Prostate cancer ([address]) 2007    SBO (small bowel obstruction) ([address]) 6/11/2019    Sepsis ([address]) 6/14/2019    Present on admission    Sleep apnea 10/22/2019    Snoring 10/22/2019    Spinal stenosis of lumbar region 3/1/2021    Stage 3 chronic kidney disease ([address]) 6/14/2019       Past Surgical History:   Procedure Laterality Date    CATARACT REMOVAL      HERNIA REPAIR      PAIN MANAGEMENT PROCEDURE Bilateral 3/8/2021    # 1 LUMBAR TRANSFORAMINAL EPIDURAL STEROID INJECTION bilateral L4 UNDER FLUOROSCOPIC GUIDANCE performed by Adam Lehman MD at [address] [place] [address]/radiation    UPPER GASTROINTESTINAL ENDOSCOPY N/A 6/15/2019    EGD ESOPHAGOGASTRODUODENOSCOPY WITH BIOPSY performed by Memo Reina MD at [address] [place] [address] N/A 8/17/2022    EGD BIOPSY performed by Memo Reina MD at [facility] ENDOSCOPY       Family History   Problem Relation Age of Onset    Heart Disease Father     Crohn's Disease Sister        Social History     Socioeconomic History    Marital status:      Spouse name: Not on file    Number of children: Not on file    Years of education: Not on file    Highest education level: Not on file   Occupational History    Occupation:

## 2023-09-14 NOTE — PROCEDURES
[address], [facility] [address]                                VASCULAR REPORT    PATIENT NAME: Dorinda Saavedra                  :        1940  MED REC NO:   [id number]                            ROOM:  ACCOUNT NO:   [de-identified]                           ADMIT DATE: 2023  PROVIDER:     Tiffanie Fernandez MD    DATE OF PROCEDURE:  2023    CAROTID ULTRASOUND REPORT    INDICATION:  Right carotid stenosis. FINDINGS:  Duplex scanning of right carotid artery revealed the patient  has a dense plaque with a peak internal carotid velocity of 318,  diastolic velocity of 61 cm/sec with plaque causing 70% stenosis. Duplex scanning of left carotid artery revealed moderate intimal  thickening with peak internal carotid velocity of 62, diastolic velocity  of 20 cm/sec with widely patent left carotid artery. IMPRESSION:  70% stenosis of right carotid artery associated with 20%  stenosis of left carotid artery, unchanged from last year.         Tiffanie Fernandez MD    D: 2023 18:07:47       T: 2023 18:10:04     [email or web address]  Job#: [id number]     Doc#: [id number]    CC:

## 2023-10-16 RX ORDER — RIVASTIGMINE TARTRATE 1.5 MG/1
CAPSULE ORAL
Qty: 180 CAPSULE | Refills: 3 | Status: SHIPPED | OUTPATIENT
Start: 2023-10-16

## 2024-03-25 DIAGNOSIS — I65.21 CAROTID STENOSIS, RIGHT: Primary | ICD-10-CM

## 2024-03-25 DIAGNOSIS — I71.40 ABDOMINAL AORTIC ANEURYSM (AAA) 3.0 CM TO 5.5 CM IN DIAMETER IN MALE (HCC): ICD-10-CM

## 2024-06-06 ENCOUNTER — HOSPITAL ENCOUNTER (OUTPATIENT)
Dept: CARDIOLOGY | Age: 84
Discharge: HOME | End: 2024-06-08
Attending: SURGERY
Payer: MEDICARE

## 2024-06-06 ENCOUNTER — OFFICE VISIT (OUTPATIENT)
Dept: VASCULAR SURGERY | Age: 84
End: 2024-06-06
Payer: MEDICARE

## 2024-06-06 VITALS — BODY MASS INDEX: 26.11 KG/M2 | WEIGHT: 182 LBS

## 2024-06-06 DIAGNOSIS — I71.40 ABDOMINAL AORTIC ANEURYSM (AAA) 3.0 CM TO 5.5 CM IN DIAMETER IN MALE (HCC): ICD-10-CM

## 2024-06-06 DIAGNOSIS — I65.21 CAROTID STENOSIS, RIGHT: ICD-10-CM

## 2024-06-06 DIAGNOSIS — I71.43 INFRARENAL ABDOMINAL AORTIC ANEURYSM (AAA) WITHOUT RUPTURE (HCC): ICD-10-CM

## 2024-06-06 DIAGNOSIS — I65.21 CAROTID STENOSIS, RIGHT: Primary | ICD-10-CM

## 2024-06-06 LAB
VAS AORTA DIST AP: 3.25 CM
VAS AORTA DIST TR: 3.37 CM
VAS AORTA MID AP: 2.15 CM
VAS AORTA MID TRANS: 2.21 CM
VAS AORTA PROX AP: 2 CM
VAS AORTA PROX TR: 2.09 CM
VAS LEFT CCA DIST EDV: 13.1 CM/S
VAS LEFT CCA DIST PSV: 63.8 CM/S
VAS LEFT CCA PROX EDV: 14 CM/S
VAS LEFT CCA PROX PSV: 82.6 CM/S
VAS LEFT COM ILIAC AP: 1.29 CM
VAS LEFT COM ILIAC TRANS: 1.37 CM
VAS LEFT ECA EDV: 7.5 CM/S
VAS LEFT ECA PSV: 78.6 CM/S
VAS LEFT ICA DIST EDV: 21.5 CM/S
VAS LEFT ICA DIST PSV: 67.5 CM/S
VAS LEFT ICA MID EDV: 15.9 CM/S
VAS LEFT ICA MID PSV: 56.3 CM/S
VAS LEFT ICA PROX EDV: 16.8 CM/S
VAS LEFT ICA PROX PSV: 71.3 CM/S
VAS LEFT ICA/CCA PSV: 0.9 NO UNITS
VAS LEFT VERTEBRAL EDV: 0 CM/S
VAS LEFT VERTEBRAL PSV: 31.1 CM/S
VAS RIGHT CCA DIST EDV: 14.1 CM/S
VAS RIGHT CCA DIST PSV: 71.1 CM/S
VAS RIGHT CCA PROX EDV: 14.1 CM/S
VAS RIGHT CCA PROX PSV: 80.2 CM/S
VAS RIGHT COM ILIAC AP: 1.48 CM
VAS RIGHT COM ILIAC TRANS: 1.41 CM
VAS RIGHT ECA EDV: 10.8 CM/S
VAS RIGHT ECA PSV: 89.6 CM/S
VAS RIGHT ICA DIST EDV: 17 CM/S
VAS RIGHT ICA DIST PSV: 89.3 CM/S
VAS RIGHT ICA MID EDV: 18.1 CM/S
VAS RIGHT ICA MID PSV: 136.7 CM/S
VAS RIGHT ICA PROX EDV: 54.4 CM/S
VAS RIGHT ICA PROX PSV: 215.4 CM/S
VAS RIGHT ICA/CCA PSV: 2.7 NO UNITS
VAS RIGHT VERTEBRAL EDV: 10.3 CM/S
VAS RIGHT VERTEBRAL PSV: 37.1 CM/S

## 2024-06-06 PROCEDURE — G8417 CALC BMI ABV UP PARAM F/U: HCPCS | Performed by: SURGERY

## 2024-06-06 PROCEDURE — G8427 DOCREV CUR MEDS BY ELIG CLIN: HCPCS | Performed by: SURGERY

## 2024-06-06 PROCEDURE — 1036F TOBACCO NON-USER: CPT | Performed by: SURGERY

## 2024-06-06 PROCEDURE — 1123F ACP DISCUSS/DSCN MKR DOCD: CPT | Performed by: SURGERY

## 2024-06-06 PROCEDURE — 93880 EXTRACRANIAL BILAT STUDY: CPT

## 2024-06-06 PROCEDURE — 93976 VASCULAR STUDY: CPT

## 2024-06-06 PROCEDURE — 99214 OFFICE O/P EST MOD 30 MIN: CPT | Performed by: SURGERY

## 2024-06-06 RX ORDER — ATORVASTATIN CALCIUM 40 MG/1
40 TABLET, FILM COATED ORAL DAILY
COMMUNITY

## 2024-06-06 NOTE — PROGRESS NOTES
Chief Complaint:   Chief Complaint   Patient presents with    Circulatory Problem     Carotid stenosis rt.         HPI: Patient came to the office for evaluation multiple vascular issues including infrarenal abdominal aortic aneurysm approximately 3.5 cm, denies abdominal pain and back pain, also noted to have right carotid stenosis    Recently patient was going down the steps and fell down and scraped his knees and elbow but healing well    His multiple medical risk factors including obstructive sleep apnea, carcinoma prostate, spinal stenosis with degenerative disc disease, previous history of stroke, hypertension and hyperlipidemia are stable      Patient denies any focal lateralizing neurological symptoms like loss of speech, vision or loss of function of extremity    Patient can walk a few blocks , and denies any symptoms of rest pain    Allergies   Allergen Reactions    Acetaminophen      Other reaction(s): Confusion, nausea vomiting    Oxycodone-Acetaminophen Nausea And Vomiting     Other reaction(s): GI Upset    Vicodin [Hydrocodone-Acetaminophen] Nausea And Vomiting       Current Outpatient Medications   Medication Sig Dispense Refill    atorvastatin (LIPITOR) 40 MG tablet Take 1 tablet by mouth daily      rivastigmine (EXELON) 1.5 MG capsule TAKE 1 CAPSULE BY MOUTH TWICE DAILY 180 capsule 3    aspirin 81 MG EC tablet Take 1 tablet by mouth daily      pantoprazole (PROTONIX) 40 MG tablet Take 1 tablet by mouth in the morning and at bedtime 30 tablet 3    memantine (NAMENDA) 10 MG tablet TAKE 1 TABLET TWICE A  tablet 3    Multiple Vitamins-Minerals (THERAPEUTIC MULTIVITAMIN-MINERALS) tablet Take 1 tablet by mouth every morning Last dose 3-1-21      amLODIPine (NORVASC) 10 MG tablet Take 1 tablet by mouth every morning      diazepam (VALIUM) 5 MG tablet Take 1 tablet by mouth every morning.       No current facility-administered medications for this visit.       Past Medical History:   Diagnosis Date

## 2024-07-22 ENCOUNTER — OFFICE VISIT (OUTPATIENT)
Dept: PODIATRY | Age: 84
End: 2024-07-22
Payer: MEDICARE

## 2024-07-22 VITALS — HEIGHT: 71 IN | WEIGHT: 184 LBS | BODY MASS INDEX: 25.76 KG/M2

## 2024-07-22 DIAGNOSIS — L84 CORNS AND CALLUS: Primary | ICD-10-CM

## 2024-07-22 DIAGNOSIS — M77.41 METATARSALGIA OF BOTH FEET: ICD-10-CM

## 2024-07-22 DIAGNOSIS — M77.42 METATARSALGIA OF BOTH FEET: ICD-10-CM

## 2024-07-22 DIAGNOSIS — M79.672 PAIN IN BOTH FEET: ICD-10-CM

## 2024-07-22 DIAGNOSIS — R26.2 DIFFICULTY WALKING: ICD-10-CM

## 2024-07-22 DIAGNOSIS — M79.671 PAIN IN BOTH FEET: ICD-10-CM

## 2024-07-22 PROCEDURE — 1123F ACP DISCUSS/DSCN MKR DOCD: CPT | Performed by: PODIATRIST

## 2024-07-22 PROCEDURE — 1036F TOBACCO NON-USER: CPT | Performed by: PODIATRIST

## 2024-07-22 PROCEDURE — G8427 DOCREV CUR MEDS BY ELIG CLIN: HCPCS | Performed by: PODIATRIST

## 2024-07-22 PROCEDURE — 99203 OFFICE O/P NEW LOW 30 MIN: CPT | Performed by: PODIATRIST

## 2024-07-22 PROCEDURE — G8417 CALC BMI ABV UP PARAM F/U: HCPCS | Performed by: PODIATRIST

## 2024-07-22 RX ORDER — AMMONIUM LACTATE 12 G/100G
CREAM TOPICAL
Qty: 385 G | Refills: 4 | Status: SHIPPED | OUTPATIENT
Start: 2024-07-22

## 2024-07-23 NOTE — PROGRESS NOTES
Patient is in today for evaluation of right foot pain. Patient says he has something on the bottom of his right foot that is causing pain and making it hard for him to walk. Pcp is Benoit Bean MD  Last ov 6/3/24  
GASTROINTESTINAL ENDOSCOPY N/A 6/15/2019    EGD ESOPHAGOGASTRODUODENOSCOPY WITH BIOPSY performed by Cal Nguyen MD at [facility] OR    UPPER GASTROINTESTINAL ENDOSCOPY N/A 2022    EGD BIOPSY performed by Cal Nguyen MD at [facility] ENDOSCOPY     Social History     Tobacco Use    Smoking status: Former     Current packs/day: 0.00     Average packs/day: 1 pack/day for 34.0 years (34.0 ttl pk-yrs)     Types: Cigarettes     Start date: 1959     Quit date: 1987     Years since quittin.2    Smokeless tobacco: Never   Vaping Use    Vaping Use: Never used   Substance Use Topics    Alcohol use: Not Currently     Comment: 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      Procedures:    None    Exam:  VASCULAR: Pedal pulses diminished to palpation bilateral foot.  Capillary refill time delayed to digits 1 through 5 bilateral foot.  Minimal hair growth noted to both lower extremities  NEUROLOGICAL: Epicritic sensations intact and symmetrical  DERMATOLOGICAL: Multiple hyperkeratotic areas noted plantar ball of the foot regions bilaterally.  Upon partial thickness debridement, no underlying ulcerations or any signs of infection noted.  Edematous tissue stable to both lower extremities.  MUSCULOSKELETAL: Mild contraction deformities noted digital regions bilateral foot.  Tenderness noted into the plantar forefoot regions bilaterally.  Antalgic gait noted upon evaluation.    Plan Per Assessment  Wilner was seen today for foot pain.    Diagnoses and all orders for this visit:    Corns and callus    Metatarsalgia of both feet    Pain in both feet    Difficulty walking        New patient evaluation and management  Partial thickness debridement corns/callosities performed to patient tolerance bilateral foot.  Prescription given for topical lotion to be applied to the affected regions as instructed.  Patient and his relative were advised on purchasing OTC 
no

## 2024-09-09 NOTE — TELEPHONE ENCOUNTER
3-4-21-call to Lauren Betancourt to advise him to hold his aspirin for 7 days before his 3-8-21 procedure, last dose was 3-1-21, he shows an understanding to not take it before his procedure.     Merrill Needs RN  Pain Management' Symptoms: Alcohol withdrawal, throwing up and feeling sick         Onset: 6 months ago  Location / description: pt reports that he last drank alcohol an hour ago.  Reports that his hands are shaking and he is vomiting.  Reports the shaking and vomiting are not new but worse than normal.  Pt endorses that he is alone but has someone he can contact to drive him to hospital.  Denies feeling suicidal or homicidal.  Reports that he has not eaten in days.  Pt voice was clear and answering all questions appropriately.    Precipitating Factors: alcohol abuse  Pain Scale (1-10), 10 highest: 0/10  What  improves / worsens symptoms: unknown  Symptom specific medications: not assessed  Recent visits (last 3-4 weeks) for same reason or recent surgery:  No    PLAN:  Go to the Emergency Department    Patient/Caller agrees to follow recommendations.    Pt endorses that he is in the [place] area, plans to go to [facility] ED.    Encouraged pt to call back as need also.      Reason for Disposition   [1] Vomiting or dry heaves AND [2] occurring frequently (i.e., vomiting > 4 times in last 4 hours)    Protocols used: Alcohol Use and [facility]-C-ED

## 2024-10-29 RX ORDER — RIVASTIGMINE TARTRATE 1.5 MG/1
CAPSULE ORAL
Qty: 180 CAPSULE | Refills: 0 | Status: SHIPPED | OUTPATIENT
Start: 2024-10-29

## 2024-10-29 NOTE — TELEPHONE ENCOUNTER
Last seen 3/27/2023  Next appt Visit date not found    Requested Prescriptions     Pending Prescriptions Disp Refills    rivastigmine (EXELON) 1.5 MG capsule [Pharmacy Med Name: RIVASTIGMINE 1.5MG CAPSULES] 180 capsule 3     Sig: TAKE 1 CAPSULE BY MOUTH TWICE DAILY        Plan:      -Continue Exelon 1.5 mg BID and Namenda 10 mg BID  -Intolerant to statins (LDL is at goal)  -On aspirin 81 mg daily for secondary stroke prevention  -Advised him to use an assistive device at all times  -Increase physical activity  -Mod vasc risk factors: goal BP <140/90, LDL <70, A1C <7.0     Reviewed signs and symptoms of stroke including B.E.F.A.S.T:   Balance: Does the person have a sudden loss of balance?  Eyes: Has the person lost vision in one or both eyes?  Face: Does the person's face look uneven?  Arm: Is one arm weaker or numb?  Speech: Is the person's speech slurred? Does the person have trouble speaking or seen confused?   Time: Call 9-1-1 immediately.      RTO in 6 mos or sooner LIZETH Angeles - CNP  2:10 PM  3/27/2023

## 2024-12-19 DIAGNOSIS — I65.21 CAROTID STENOSIS, RIGHT: Primary | ICD-10-CM

## 2025-01-13 ENCOUNTER — OUTSIDE SERVICES (OUTPATIENT)
Dept: PRIMARY CARE CLINIC | Age: 85
End: 2025-01-13

## 2025-01-13 DIAGNOSIS — R05.1 ACUTE COUGH: Primary | ICD-10-CM

## 2025-01-13 DIAGNOSIS — N18.2 CHRONIC KIDNEY DISEASE (CKD) STAGE G2/A2, MILDLY DECREASED GLOMERULAR FILTRATION RATE (GFR) BETWEEN 60-89 ML/MIN/1.73 SQUARE METER AND ALBUMINURIA CREATININE RATIO BETWEEN 30-299 MG/G: ICD-10-CM

## 2025-01-13 DIAGNOSIS — I10 PRIMARY HYPERTENSION: ICD-10-CM

## 2025-01-13 DIAGNOSIS — E78.2 MIXED HYPERLIPIDEMIA: ICD-10-CM

## 2025-01-13 DIAGNOSIS — K21.9 GASTROESOPHAGEAL REFLUX DISEASE WITHOUT ESOPHAGITIS: ICD-10-CM
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
## 2025-01-27 RX ORDER — RIVASTIGMINE TARTRATE 1.5 MG/1
1.5 CAPSULE ORAL 2 TIMES DAILY
Qty: 180 CAPSULE | Refills: 3 | Status: SHIPPED | OUTPATIENT
Start: 2025-01-27 | End: 2026-01-22

## 2025-02-17 ENCOUNTER — OUTSIDE SERVICES (OUTPATIENT)
Dept: PRIMARY CARE CLINIC | Age: 85
End: 2025-02-17

## 2025-02-17 DIAGNOSIS — C61 PROSTATE CANCER (HCC): ICD-10-CM

## 2025-02-17 DIAGNOSIS — I10 PRIMARY HYPERTENSION: ICD-10-CM

## 2025-02-17 DIAGNOSIS — F02.80 MIXED ALZHEIMER'S AND VASCULAR DEMENTIA (HCC): ICD-10-CM

## 2025-02-17 DIAGNOSIS — R63.5 WEIGHT GAIN: Primary | ICD-10-CM

## 2025-02-17 DIAGNOSIS — K21.9 GASTROESOPHAGEAL REFLUX DISEASE WITHOUT ESOPHAGITIS: ICD-10-CM

## 2025-02-17 DIAGNOSIS — G30.9 MIXED ALZHEIMER'S AND VASCULAR DEMENTIA (HCC): ICD-10-CM

## 2025-02-17 DIAGNOSIS — F01.50 MIXED ALZHEIMER'S AND VASCULAR DEMENTIA (HCC): ICD-10-CM
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
## 2025-02-26 ENCOUNTER — OUTSIDE SERVICES (OUTPATIENT)
Dept: PRIMARY CARE CLINIC | Age: 85
End: 2025-02-26

## 2025-02-26 DIAGNOSIS — I10 PRIMARY HYPERTENSION: ICD-10-CM

## 2025-02-26 DIAGNOSIS — G30.9 MIXED ALZHEIMER'S AND VASCULAR DEMENTIA (HCC): ICD-10-CM

## 2025-02-26 DIAGNOSIS — F01.50 MIXED ALZHEIMER'S AND VASCULAR DEMENTIA (HCC): ICD-10-CM

## 2025-02-26 DIAGNOSIS — E78.2 MIXED HYPERLIPIDEMIA: ICD-10-CM

## 2025-02-26 DIAGNOSIS — N18.30 STAGE 3 CHRONIC KIDNEY DISEASE, UNSPECIFIED WHETHER STAGE 3A OR 3B CKD (HCC): ICD-10-CM

## 2025-02-26 DIAGNOSIS — F02.80 MIXED ALZHEIMER'S AND VASCULAR DEMENTIA (HCC): ICD-10-CM

## 2025-02-26 DIAGNOSIS — M25.551 RIGHT HIP PAIN: Primary | ICD-10-CM
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
## 2025-03-03 DIAGNOSIS — F41.9 ANXIETY: Primary | ICD-10-CM

## 2025-03-03 RX ORDER — DIAZEPAM 5 MG/1
5 TABLET ORAL EVERY MORNING
Qty: 30 TABLET | Refills: 0 | Status: SHIPPED
Start: 2025-03-03 | End: 2025-03-04

## 2025-03-03 NOTE — TELEPHONE ENCOUNTER
Name of Medication(s) Requested:  Requested Prescriptions     Pending Prescriptions Disp Refills    diazePAM (VALIUM) 5 MG tablet 30 tablet 0     Sig: Take 1 tablet by mouth every morning for 30 days. Max Daily Amount: 5 mg       Medication is on current medication list Yes    Dosage and directions were verified? Yes    Quantity verified: 30 day supply     Pharmacy Verified?  Yes    Last Appointment:  Visit date not found    Future appts:  No future appointments.     (If no appt send self scheduling link. .REFILLAPPT)  Scheduling request sent?     [] Yes  [x] No    Does patient need updated?  [] Yes  [x] No

## 2025-03-04 DIAGNOSIS — F41.9 ANXIETY: ICD-10-CM

## 2025-03-04 RX ORDER — DIAZEPAM 2 MG/1
2 TABLET ORAL DAILY
COMMUNITY
End: 2025-03-04 | Stop reason: SDUPTHER

## 2025-03-04 RX ORDER — DIAZEPAM 2 MG/1
2 TABLET ORAL DAILY
Qty: 30 TABLET | Refills: 0 | Status: SHIPPED | OUTPATIENT
Start: 2025-03-04 | End: 2025-04-03

## 2025-03-04 NOTE — TELEPHONE ENCOUNTER
Name of Medication(s) Requested:  Requested Prescriptions     Pending Prescriptions Disp Refills    diazePAM (VALIUM) 2 MG tablet 30 tablet 0     Sig: Take 1 tablet by mouth daily for 30 days. Max Daily Amount: 2 mg       Medication is on current medication list Yes    Dosage and directions were verified? Yes    Quantity verified: 30 day supply     Pharmacy Verified?  Yes    Last Appointment:  Visit date not found    Future appts:  No future appointments.     (If no appt send self scheduling link. .REFILLAPPT)  Scheduling request sent?     [] Yes  [x] No    Does patient need updated?  [] Yes  [x] No

## 2025-03-10 ENCOUNTER — OUTSIDE SERVICES (OUTPATIENT)
Dept: PRIMARY CARE CLINIC | Age: 85
End: 2025-03-10
Payer: MEDICARE

## 2025-03-10 DIAGNOSIS — F02.80 MIXED ALZHEIMER'S AND VASCULAR DEMENTIA (HCC): ICD-10-CM

## 2025-03-10 DIAGNOSIS — G30.9 MIXED ALZHEIMER'S AND VASCULAR DEMENTIA (HCC): ICD-10-CM

## 2025-03-10 DIAGNOSIS — K21.9 GASTROESOPHAGEAL REFLUX DISEASE WITHOUT ESOPHAGITIS: ICD-10-CM

## 2025-03-10 DIAGNOSIS — N18.30 STAGE 3 CHRONIC KIDNEY DISEASE, UNSPECIFIED WHETHER STAGE 3A OR 3B CKD (HCC): ICD-10-CM

## 2025-03-10 DIAGNOSIS — F01.50 MIXED ALZHEIMER'S AND VASCULAR DEMENTIA (HCC): ICD-10-CM

## 2025-03-10 DIAGNOSIS — M25.551 RIGHT HIP PAIN: ICD-10-CM

## 2025-03-10 DIAGNOSIS — K59.00 CONSTIPATION, UNSPECIFIED CONSTIPATION TYPE: Primary | ICD-10-CM

## 2025-03-10 PROCEDURE — 99349 HOME/RES VST EST MOD MDM 40: CPT | Performed by: NURSE PRACTITIONER
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
## 2025-03-12 DIAGNOSIS — F41.9 ANXIETY: ICD-10-CM

## 2025-03-12 RX ORDER — DIAZEPAM 2 MG/1
2 TABLET ORAL DAILY
Qty: 30 TABLET | Refills: 0 | Status: SHIPPED | OUTPATIENT
Start: 2025-03-12 | End: 2025-04-11

## 2025-04-03 DIAGNOSIS — F41.9 ANXIETY: ICD-10-CM

## 2025-04-03 RX ORDER — DIAZEPAM 2 MG/1
2 TABLET ORAL DAILY
Qty: 30 TABLET | Refills: 0 | Status: SHIPPED | OUTPATIENT
Start: 2025-04-03 | End: 2025-05-03

## 2025-04-03 NOTE — TELEPHONE ENCOUNTER
Name of Medication(s) Requested:  Requested Prescriptions     Pending Prescriptions Disp Refills    diazePAM (VALIUM) 2 MG tablet 30 tablet 0     Sig: Take 1 tablet by mouth daily for 30 days. Max Daily Amount: 2 mg       Medication is on current medication list Yes    Dosage and directions were verified? Yes    Quantity verified: 30 day supply     Pharmacy Verified?  Yes    Last Appointment:  Visit date not found    Future appts:  No future appointments.     (If no appt send self scheduling link. .REFILLAPPT)  Scheduling request sent?     [] Yes  [x] No    Does patient need updated?  [] Yes  [x] No   marijuana

## 2025-05-01 DIAGNOSIS — F41.9 ANXIETY: ICD-10-CM

## 2025-05-02 RX ORDER — DIAZEPAM 2 MG/1
2 TABLET ORAL DAILY
Qty: 30 TABLET | Refills: 0 | Status: SHIPPED | OUTPATIENT
Start: 2025-05-02 | End: 2025-06-01

## 2025-05-14 ENCOUNTER — OUTSIDE SERVICES (OUTPATIENT)
Dept: PRIMARY CARE CLINIC | Age: 85
End: 2025-05-14

## 2025-05-14 DIAGNOSIS — F02.80 MIXED ALZHEIMER'S AND VASCULAR DEMENTIA (HCC): ICD-10-CM

## 2025-05-14 DIAGNOSIS — K21.9 GASTROESOPHAGEAL REFLUX DISEASE WITHOUT ESOPHAGITIS: ICD-10-CM

## 2025-05-14 DIAGNOSIS — E78.2 MIXED HYPERLIPIDEMIA: ICD-10-CM

## 2025-05-14 DIAGNOSIS — G30.9 MIXED ALZHEIMER'S AND VASCULAR DEMENTIA (HCC): ICD-10-CM

## 2025-05-14 DIAGNOSIS — N18.30 STAGE 3 CHRONIC KIDNEY DISEASE, UNSPECIFIED WHETHER STAGE 3A OR 3B CKD (HCC): Primary | ICD-10-CM

## 2025-05-14 DIAGNOSIS — F01.50 MIXED ALZHEIMER'S AND VASCULAR DEMENTIA (HCC): ICD-10-CM

## 2025-05-14 DIAGNOSIS — I10 PRIMARY HYPERTENSION: ICD-10-CM
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
## 2025-05-15 DIAGNOSIS — F41.9 ANXIETY: ICD-10-CM

## 2025-05-15 RX ORDER — DIAZEPAM 2 MG/1
2 TABLET ORAL DAILY
Qty: 30 TABLET | Refills: 1 | Status: SHIPPED | OUTPATIENT
Start: 2025-05-15 | End: 2025-07-14

## 2025-05-15 NOTE — TELEPHONE ENCOUNTER
Name of Medication(s) Requested:  Requested Prescriptions     Pending Prescriptions Disp Refills    diazePAM (VALIUM) 2 MG tablet 30 tablet      Sig: Take 1 tablet by mouth daily for 30 days. Max Daily Amount: 2 mg       Medication is on current medication list Yes    Dosage and directions were verified? Yes    Quantity verified: 30 day supply     Pharmacy Verified?  Yes    Last Appointment:  Visit date not found    Future appts:  No future appointments.     (If no appt send self scheduling link. .REFILLAPPT)  Scheduling request sent?     [] Yes  [x] No    Does patient need updated?  [] Yes  [x] No

## 2025-05-19 DIAGNOSIS — F41.9 ANXIETY: ICD-10-CM

## 2025-05-25 RX ORDER — DIAZEPAM 2 MG/1
2 TABLET ORAL DAILY
Qty: 30 TABLET | Refills: 1 | OUTPATIENT
Start: 2025-05-25 | End: 2025-07-24

## 2025-06-02 ENCOUNTER — OUTSIDE SERVICES (OUTPATIENT)
Dept: PRIMARY CARE CLINIC | Age: 85
End: 2025-06-02
Payer: MEDICARE

## 2025-06-02 DIAGNOSIS — I10 PRIMARY HYPERTENSION: ICD-10-CM

## 2025-06-02 DIAGNOSIS — K59.00 CONSTIPATION, UNSPECIFIED CONSTIPATION TYPE: ICD-10-CM

## 2025-06-02 DIAGNOSIS — N18.2 CHRONIC KIDNEY DISEASE (CKD) STAGE G2/A2, MILDLY DECREASED GLOMERULAR FILTRATION RATE (GFR) BETWEEN 60-89 ML/MIN/1.73 SQUARE METER AND ALBUMINURIA CREATININE RATIO BETWEEN 30-299 MG/G: ICD-10-CM

## 2025-06-02 DIAGNOSIS — E78.2 MIXED HYPERLIPIDEMIA: ICD-10-CM

## 2025-06-02 DIAGNOSIS — K21.9 GASTROESOPHAGEAL REFLUX DISEASE WITHOUT ESOPHAGITIS: Primary | ICD-10-CM

## 2025-06-02 PROCEDURE — 99349 HOME/RES VST EST MOD MDM 40: CPT | Performed by: NURSE PRACTITIONER
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
## 2025-06-09 ENCOUNTER — OUTSIDE SERVICES (OUTPATIENT)
Dept: PRIMARY CARE CLINIC | Age: 85
End: 2025-06-09
Payer: MEDICARE

## 2025-06-09 DIAGNOSIS — L98.9 SKIN LESION: Primary | ICD-10-CM

## 2025-06-09 DIAGNOSIS — N18.30 STAGE 3 CHRONIC KIDNEY DISEASE, UNSPECIFIED WHETHER STAGE 3A OR 3B CKD (HCC): ICD-10-CM

## 2025-06-09 DIAGNOSIS — I10 PRIMARY HYPERTENSION: ICD-10-CM

## 2025-06-09 DIAGNOSIS — R63.5 WEIGHT GAIN: ICD-10-CM

## 2025-06-09 DIAGNOSIS — K21.9 GASTROESOPHAGEAL REFLUX DISEASE WITHOUT ESOPHAGITIS: ICD-10-CM

## 2025-06-09 PROCEDURE — 99349 HOME/RES VST EST MOD MDM 40: CPT | Performed by: NURSE PRACTITIONER
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
## 2025-06-27 DIAGNOSIS — F41.9 ANXIETY: ICD-10-CM

## 2025-06-27 NOTE — TELEPHONE ENCOUNTER
Name of Medication(s) Requested:  Requested Prescriptions     Pending Prescriptions Disp Refills    diazePAM (VALIUM) 2 MG tablet 30 tablet 1     Sig: Take 1 tablet by mouth daily for 60 days. Max Daily Amount: 2 mg       Medication is on current medication list Yes    Dosage and directions were verified? Yes    Quantity verified: 30 day supply     Pharmacy Verified?  Yes    Last Appointment:  Visit date not found    Future appts:  No future appointments.     (If no appt send self scheduling link. .REFILLAPPT)  Scheduling request sent?     [] Yes  [x] No    Does patient need updated?  [] Yes  [x] No

## 2025-06-29 RX ORDER — DIAZEPAM 2 MG/1
2 TABLET ORAL DAILY
Qty: 30 TABLET | Refills: 1 | Status: SHIPPED | OUTPATIENT
Start: 2025-06-29 | End: 2025-08-28

## (undated) DEVICE — SPONGE GZ W4XL4IN RAYON POLY FILL CVR W/ NONWOVEN FAB

## (undated) DEVICE — Device: Brand: PORTEX

## (undated) DEVICE — Z DISCONTINUED APPLICATOR SURG PREP 0.35OZ 2% CHG 70% ISO ALC W/ HI LT

## (undated) DEVICE — 12 ML SYRINGE,LUER-LOCK TIP: Brand: MONOJECT

## (undated) DEVICE — FORCEPS BX OVL CUP FEN DISPOSABLE CAP L 160CM RAD JAW 4

## (undated) DEVICE — NEEDLE HYPO 25GA L1.5IN BLU POLYPR HUB S STL REG BVL STR

## (undated) DEVICE — GAUZE,SPONGE,4"X4",12PLY,STERILE,LF,2'S: Brand: MEDLINE

## (undated) DEVICE — 6 ML SYRINGE LUER-LOCK TIP: Brand: MONOJECT

## (undated) DEVICE — SYRINGE, LUER LOCK, 5ML: Brand: MEDLINE

## (undated) DEVICE — GRADUATE TRIANG MEASURE 1000ML BLK PRNT

## (undated) DEVICE — GLOVE ORANGE PI 7 1/2   MSG9075

## (undated) DEVICE — BLOCK BITE 60FR RUBBER ADLT DENTAL

## (undated) DEVICE — NON-DEHP CATHETER EXTENSION SET, MALE LUER LOCK ADAPTER

## (undated) DEVICE — 3M™ RED DOT™ MONITORING ELECTRODE WITH FOAM TAPE AND STICKY GEL 2560, 50/BAG, 20/CASE, 72/PLT: Brand: RED DOT™

## (undated) DEVICE — NEEDLE HYPO 18GA L1.5IN PNK POLYPR HUB S STL THN WALL FILL

## (undated) DEVICE — BANDAGE ADH W1XL3IN NAT FAB WVN FLX DURABLE N ADH PD SEAL